# Patient Record
Sex: FEMALE | Race: WHITE | NOT HISPANIC OR LATINO | Employment: FULL TIME | URBAN - METROPOLITAN AREA
[De-identification: names, ages, dates, MRNs, and addresses within clinical notes are randomized per-mention and may not be internally consistent; named-entity substitution may affect disease eponyms.]

---

## 2023-02-23 ENCOUNTER — APPOINTMENT (OUTPATIENT)
Dept: CT IMAGING | Facility: CLINIC | Age: 58
DRG: 190 | End: 2023-02-23
Attending: EMERGENCY MEDICINE
Payer: COMMERCIAL

## 2023-02-23 ENCOUNTER — HOSPITAL ENCOUNTER (INPATIENT)
Facility: CLINIC | Age: 58
LOS: 6 days | Discharge: HOME | DRG: 190 | End: 2023-03-01
Attending: EMERGENCY MEDICINE | Admitting: INTERNAL MEDICINE
Payer: COMMERCIAL

## 2023-02-23 ENCOUNTER — TRANSFERRED RECORDS (OUTPATIENT)
Dept: HEALTH INFORMATION MANAGEMENT | Facility: CLINIC | Age: 58
End: 2023-02-23

## 2023-02-23 ENCOUNTER — APPOINTMENT (OUTPATIENT)
Dept: GENERAL RADIOLOGY | Facility: CLINIC | Age: 58
DRG: 190 | End: 2023-02-23
Attending: EMERGENCY MEDICINE
Payer: COMMERCIAL

## 2023-02-23 DIAGNOSIS — Z20.822 LAB TEST NEGATIVE FOR COVID-19 VIRUS: ICD-10-CM

## 2023-02-23 DIAGNOSIS — J44.1 COPD EXACERBATION (H): ICD-10-CM

## 2023-02-23 LAB
ALBUMIN SERPL BCG-MCNC: 4.4 G/DL (ref 3.5–5.2)
ALBUMIN UR-MCNC: 70 MG/DL
ALP SERPL-CCNC: 91 U/L (ref 35–104)
ALT SERPL W P-5'-P-CCNC: 14 U/L (ref 10–35)
ANION GAP SERPL CALCULATED.3IONS-SCNC: 13 MMOL/L (ref 7–15)
APPEARANCE UR: CLEAR
AST SERPL W P-5'-P-CCNC: 22 U/L (ref 10–35)
ATRIAL RATE - MUSE: 114 BPM
BASE EXCESS BLDV CALC-SCNC: 1.2 MMOL/L (ref -7.7–1.9)
BASE EXCESS BLDV CALC-SCNC: 3.6 MMOL/L (ref -7.7–1.9)
BASE EXCESS BLDV CALC-SCNC: 5 MMOL/L (ref -7.7–1.9)
BASOPHILS # BLD AUTO: 0 10E3/UL (ref 0–0.2)
BASOPHILS NFR BLD AUTO: 0 %
BILIRUB SERPL-MCNC: 0.3 MG/DL
BILIRUB UR QL STRIP: NEGATIVE
BUN SERPL-MCNC: 9.7 MG/DL (ref 6–20)
C PNEUM DNA SPEC QL NAA+PROBE: NOT DETECTED
CALCIUM SERPL-MCNC: 9.5 MG/DL (ref 8.6–10)
CHLORIDE SERPL-SCNC: 100 MMOL/L (ref 98–107)
COLOR UR AUTO: ABNORMAL
CREAT SERPL-MCNC: 0.56 MG/DL (ref 0.51–0.95)
DEPRECATED HCO3 PLAS-SCNC: 28 MMOL/L (ref 22–29)
DIASTOLIC BLOOD PRESSURE - MUSE: NORMAL MMHG
EOSINOPHIL # BLD AUTO: 0 10E3/UL (ref 0–0.7)
EOSINOPHIL NFR BLD AUTO: 0 %
ERYTHROCYTE [DISTWIDTH] IN BLOOD BY AUTOMATED COUNT: 14.2 % (ref 10–15)
FLUAV H1 2009 PAND RNA SPEC QL NAA+PROBE: NOT DETECTED
FLUAV H1 RNA SPEC QL NAA+PROBE: NOT DETECTED
FLUAV H3 RNA SPEC QL NAA+PROBE: NOT DETECTED
FLUAV RNA SPEC QL NAA+PROBE: NEGATIVE
FLUAV RNA SPEC QL NAA+PROBE: NOT DETECTED
FLUBV RNA RESP QL NAA+PROBE: NEGATIVE
FLUBV RNA SPEC QL NAA+PROBE: NOT DETECTED
GFR SERPL CREATININE-BSD FRML MDRD: >90 ML/MIN/1.73M2
GLUCOSE SERPL-MCNC: 140 MG/DL (ref 70–99)
GLUCOSE UR STRIP-MCNC: NEGATIVE MG/DL
HADV DNA SPEC QL NAA+PROBE: NOT DETECTED
HCO3 BLDV-SCNC: 28 MMOL/L (ref 21–28)
HCO3 BLDV-SCNC: 29 MMOL/L (ref 21–28)
HCO3 BLDV-SCNC: 31 MMOL/L (ref 21–28)
HCO3 BLDV-SCNC: 32 MMOL/L (ref 21–28)
HCOV PNL SPEC NAA+PROBE: NOT DETECTED
HCT VFR BLD AUTO: 47.1 % (ref 35–47)
HGB BLD-MCNC: 14.7 G/DL (ref 11.7–15.7)
HGB UR QL STRIP: ABNORMAL
HMPV RNA SPEC QL NAA+PROBE: DETECTED
HOLD SPECIMEN: NORMAL
HPIV1 RNA SPEC QL NAA+PROBE: NOT DETECTED
HPIV2 RNA SPEC QL NAA+PROBE: NOT DETECTED
HPIV3 RNA SPEC QL NAA+PROBE: NOT DETECTED
HPIV4 RNA SPEC QL NAA+PROBE: NOT DETECTED
IMM GRANULOCYTES # BLD: 0 10E3/UL
IMM GRANULOCYTES NFR BLD: 1 %
INTERPRETATION ECG - MUSE: NORMAL
KETONES UR STRIP-MCNC: NEGATIVE MG/DL
L PNEUMO1 AG UR QL IA: NEGATIVE
LACTATE BLD-SCNC: 1 MMOL/L
LEUKOCYTE ESTERASE UR QL STRIP: NEGATIVE
LYMPHOCYTES # BLD AUTO: 0.6 10E3/UL (ref 0.8–5.3)
LYMPHOCYTES NFR BLD AUTO: 9 %
M PNEUMO DNA SPEC QL NAA+PROBE: NOT DETECTED
MCH RBC QN AUTO: 27.9 PG (ref 26.5–33)
MCHC RBC AUTO-ENTMCNC: 31.2 G/DL (ref 31.5–36.5)
MCV RBC AUTO: 90 FL (ref 78–100)
MONOCYTES # BLD AUTO: 0.3 10E3/UL (ref 0–1.3)
MONOCYTES NFR BLD AUTO: 4 %
MUCOUS THREADS #/AREA URNS LPF: PRESENT /LPF
NEUTROPHILS # BLD AUTO: 5.6 10E3/UL (ref 1.6–8.3)
NEUTROPHILS NFR BLD AUTO: 86 %
NITRATE UR QL: NEGATIVE
NRBC # BLD AUTO: 0 10E3/UL
NRBC BLD AUTO-RTO: 0 /100
NT-PROBNP SERPL-MCNC: 810 PG/ML (ref 0–900)
O2/TOTAL GAS SETTING VFR VENT: 40 %
O2/TOTAL GAS SETTING VFR VENT: 5 %
O2/TOTAL GAS SETTING VFR VENT: 5 %
P AXIS - MUSE: 67 DEGREES
PCO2 BLDV: 56 MM HG (ref 40–50)
PCO2 BLDV: 56 MM HG (ref 40–50)
PCO2 BLDV: 57 MM HG (ref 40–50)
PCO2 BLDV: 83 MM HG (ref 40–50)
PH BLDV: 7.14 [PH] (ref 7.32–7.43)
PH BLDV: 7.32 [PH] (ref 7.32–7.43)
PH BLDV: 7.34 [PH] (ref 7.32–7.43)
PH BLDV: 7.37 [PH] (ref 7.32–7.43)
PH UR STRIP: 6 [PH] (ref 5–7)
PLATELET # BLD AUTO: 222 10E3/UL (ref 150–450)
PO2 BLDV: 52 MM HG (ref 25–47)
PO2 BLDV: 54 MM HG (ref 25–47)
PO2 BLDV: 58 MM HG (ref 25–47)
PO2 BLDV: 65 MM HG (ref 25–47)
POTASSIUM SERPL-SCNC: 4.4 MMOL/L (ref 3.4–5.3)
PR INTERVAL - MUSE: 132 MS
PROCALCITONIN SERPL IA-MCNC: 0.05 NG/ML
PROT SERPL-MCNC: 7.8 G/DL (ref 6.4–8.3)
QRS DURATION - MUSE: 78 MS
QT - MUSE: 334 MS
QTC - MUSE: 460 MS
R AXIS - MUSE: 162 DEGREES
RBC # BLD AUTO: 5.26 10E6/UL (ref 3.8–5.2)
RBC URINE: 23 /HPF
RSV RNA SPEC NAA+PROBE: NEGATIVE
RSV RNA SPEC QL NAA+PROBE: NOT DETECTED
RSV RNA SPEC QL NAA+PROBE: NOT DETECTED
RV+EV RNA SPEC QL NAA+PROBE: NOT DETECTED
S PNEUM AG SPEC QL: NEGATIVE
SAO2 % BLDV: 75 % (ref 94–100)
SARS-COV-2 RNA RESP QL NAA+PROBE: NEGATIVE
SODIUM SERPL-SCNC: 141 MMOL/L (ref 136–145)
SP GR UR STRIP: 1.02 (ref 1–1.03)
SQUAMOUS EPITHELIAL: 2 /HPF
SYSTOLIC BLOOD PRESSURE - MUSE: NORMAL MMHG
T AXIS - MUSE: 48 DEGREES
TROPONIN T SERPL HS-MCNC: 13 NG/L
UROBILINOGEN UR STRIP-MCNC: NORMAL MG/DL
VENTRICULAR RATE- MUSE: 114 BPM
WBC # BLD AUTO: 6.5 10E3/UL (ref 4–11)
WBC URINE: 1 /HPF

## 2023-02-23 PROCEDURE — 84145 PROCALCITONIN (PCT): CPT

## 2023-02-23 PROCEDURE — 250N000009 HC RX 250

## 2023-02-23 PROCEDURE — 85025 COMPLETE CBC W/AUTO DIFF WBC: CPT | Performed by: EMERGENCY MEDICINE

## 2023-02-23 PROCEDURE — 80053 COMPREHEN METABOLIC PANEL: CPT | Performed by: EMERGENCY MEDICINE

## 2023-02-23 PROCEDURE — 87637 SARSCOV2&INF A&B&RSV AMP PRB: CPT | Performed by: EMERGENCY MEDICINE

## 2023-02-23 PROCEDURE — 83880 ASSAY OF NATRIURETIC PEPTIDE: CPT | Performed by: EMERGENCY MEDICINE

## 2023-02-23 PROCEDURE — 250N000009 HC RX 250: Performed by: EMERGENCY MEDICINE

## 2023-02-23 PROCEDURE — 36415 COLL VENOUS BLD VENIPUNCTURE: CPT

## 2023-02-23 PROCEDURE — 71275 CT ANGIOGRAPHY CHEST: CPT | Mod: 26 | Performed by: RADIOLOGY

## 2023-02-23 PROCEDURE — 87581 M.PNEUMON DNA AMP PROBE: CPT

## 2023-02-23 PROCEDURE — 93010 ELECTROCARDIOGRAM REPORT: CPT | Mod: 59 | Performed by: EMERGENCY MEDICINE

## 2023-02-23 PROCEDURE — 82803 BLOOD GASES ANY COMBINATION: CPT

## 2023-02-23 PROCEDURE — 250N000011 HC RX IP 250 OP 636: Performed by: EMERGENCY MEDICINE

## 2023-02-23 PROCEDURE — 36415 COLL VENOUS BLD VENIPUNCTURE: CPT | Performed by: EMERGENCY MEDICINE

## 2023-02-23 PROCEDURE — 71045 X-RAY EXAM CHEST 1 VIEW: CPT | Mod: 26 | Performed by: RADIOLOGY

## 2023-02-23 PROCEDURE — 87899 AGENT NOS ASSAY W/OPTIC: CPT

## 2023-02-23 PROCEDURE — 93308 TTE F-UP OR LMTD: CPT | Mod: 26 | Performed by: EMERGENCY MEDICINE

## 2023-02-23 PROCEDURE — 93005 ELECTROCARDIOGRAM TRACING: CPT | Performed by: EMERGENCY MEDICINE

## 2023-02-23 PROCEDURE — 99291 CRITICAL CARE FIRST HOUR: CPT | Mod: 25 | Performed by: EMERGENCY MEDICINE

## 2023-02-23 PROCEDURE — 82803 BLOOD GASES ANY COMBINATION: CPT | Performed by: EMERGENCY MEDICINE

## 2023-02-23 PROCEDURE — 250N000013 HC RX MED GY IP 250 OP 250 PS 637

## 2023-02-23 PROCEDURE — 94640 AIRWAY INHALATION TREATMENT: CPT | Mod: 76

## 2023-02-23 PROCEDURE — 81001 URINALYSIS AUTO W/SCOPE: CPT | Performed by: EMERGENCY MEDICINE

## 2023-02-23 PROCEDURE — 84484 ASSAY OF TROPONIN QUANT: CPT | Performed by: EMERGENCY MEDICINE

## 2023-02-23 PROCEDURE — 71275 CT ANGIOGRAPHY CHEST: CPT

## 2023-02-23 PROCEDURE — 5A09357 ASSISTANCE WITH RESPIRATORY VENTILATION, LESS THAN 24 CONSECUTIVE HOURS, CONTINUOUS POSITIVE AIRWAY PRESSURE: ICD-10-PCS | Performed by: INTERNAL MEDICINE

## 2023-02-23 PROCEDURE — 94640 AIRWAY INHALATION TREATMENT: CPT

## 2023-02-23 PROCEDURE — 96368 THER/DIAG CONCURRENT INF: CPT | Performed by: EMERGENCY MEDICINE

## 2023-02-23 PROCEDURE — 93308 TTE F-UP OR LMTD: CPT | Performed by: EMERGENCY MEDICINE

## 2023-02-23 PROCEDURE — 99223 1ST HOSP IP/OBS HIGH 75: CPT | Mod: GC | Performed by: INTERNAL MEDICINE

## 2023-02-23 PROCEDURE — 96365 THER/PROPH/DIAG IV INF INIT: CPT | Performed by: EMERGENCY MEDICINE

## 2023-02-23 PROCEDURE — 999N000157 HC STATISTIC RCP TIME EA 10 MIN

## 2023-02-23 PROCEDURE — C9803 HOPD COVID-19 SPEC COLLECT: HCPCS | Performed by: EMERGENCY MEDICINE

## 2023-02-23 PROCEDURE — 96375 TX/PRO/DX INJ NEW DRUG ADDON: CPT | Performed by: EMERGENCY MEDICINE

## 2023-02-23 PROCEDURE — 120N000003 HC R&B IMCU UMMC

## 2023-02-23 PROCEDURE — 94660 CPAP INITIATION&MGMT: CPT

## 2023-02-23 PROCEDURE — 71045 X-RAY EXAM CHEST 1 VIEW: CPT

## 2023-02-23 RX ORDER — IOPAMIDOL 755 MG/ML
75 INJECTION, SOLUTION INTRAVASCULAR ONCE
Status: COMPLETED | OUTPATIENT
Start: 2023-02-23 | End: 2023-02-23

## 2023-02-23 RX ORDER — LIDOCAINE 40 MG/G
CREAM TOPICAL
Status: DISCONTINUED | OUTPATIENT
Start: 2023-02-23 | End: 2023-03-01 | Stop reason: HOSPADM

## 2023-02-23 RX ORDER — METHYLPREDNISOLONE SODIUM SUCCINATE 125 MG/2ML
125 INJECTION, POWDER, LYOPHILIZED, FOR SOLUTION INTRAMUSCULAR; INTRAVENOUS ONCE
Status: COMPLETED | OUTPATIENT
Start: 2023-02-23 | End: 2023-02-23

## 2023-02-23 RX ORDER — ALBUTEROL SULFATE 0.83 MG/ML
2.5 SOLUTION RESPIRATORY (INHALATION) ONCE
Status: COMPLETED | OUTPATIENT
Start: 2023-02-23 | End: 2023-02-23

## 2023-02-23 RX ORDER — IPRATROPIUM BROMIDE AND ALBUTEROL SULFATE 2.5; .5 MG/3ML; MG/3ML
3 SOLUTION RESPIRATORY (INHALATION) EVERY 4 HOURS
Status: DISCONTINUED | OUTPATIENT
Start: 2023-02-23 | End: 2023-02-26

## 2023-02-23 RX ORDER — NICOTINE 21 MG/24HR
1 PATCH, TRANSDERMAL 24 HOURS TRANSDERMAL DAILY
Status: DISCONTINUED | OUTPATIENT
Start: 2023-02-23 | End: 2023-02-24

## 2023-02-23 RX ORDER — LISINOPRIL 10 MG/1
10 TABLET ORAL DAILY
Status: DISCONTINUED | OUTPATIENT
Start: 2023-02-23 | End: 2023-03-01 | Stop reason: HOSPADM

## 2023-02-23 RX ORDER — IPRATROPIUM BROMIDE AND ALBUTEROL SULFATE 2.5; .5 MG/3ML; MG/3ML
3 SOLUTION RESPIRATORY (INHALATION)
Status: DISCONTINUED | OUTPATIENT
Start: 2023-02-23 | End: 2023-02-23

## 2023-02-23 RX ORDER — LEVOFLOXACIN 5 MG/ML
500 INJECTION, SOLUTION INTRAVENOUS EVERY 24 HOURS
Status: DISCONTINUED | OUTPATIENT
Start: 2023-02-23 | End: 2023-02-23

## 2023-02-23 RX ORDER — ACETAMINOPHEN 325 MG/1
650 TABLET ORAL EVERY 4 HOURS PRN
Status: DISCONTINUED | OUTPATIENT
Start: 2023-02-23 | End: 2023-02-26

## 2023-02-23 RX ORDER — ALBUTEROL SULFATE 0.83 MG/ML
2.5 SOLUTION RESPIRATORY (INHALATION)
Status: DISCONTINUED | OUTPATIENT
Start: 2023-02-23 | End: 2023-02-27

## 2023-02-23 RX ORDER — POLYETHYLENE GLYCOL 3350 17 G/17G
17 POWDER, FOR SOLUTION ORAL DAILY PRN
Status: DISCONTINUED | OUTPATIENT
Start: 2023-02-23 | End: 2023-03-01 | Stop reason: HOSPADM

## 2023-02-23 RX ORDER — MAGNESIUM SULFATE HEPTAHYDRATE 40 MG/ML
2 INJECTION, SOLUTION INTRAVENOUS ONCE
Status: COMPLETED | OUTPATIENT
Start: 2023-02-23 | End: 2023-02-23

## 2023-02-23 RX ORDER — AMITRIPTYLINE HYDROCHLORIDE 10 MG/1
10 TABLET ORAL AT BEDTIME
Status: DISCONTINUED | OUTPATIENT
Start: 2023-02-23 | End: 2023-03-01 | Stop reason: HOSPADM

## 2023-02-23 RX ORDER — ASPIRIN 81 MG/1
81 TABLET ORAL DAILY
Status: DISCONTINUED | OUTPATIENT
Start: 2023-02-23 | End: 2023-03-01 | Stop reason: HOSPADM

## 2023-02-23 RX ADMIN — AMITRIPTYLINE HYDROCHLORIDE 10 MG: 10 TABLET, FILM COATED ORAL at 23:03

## 2023-02-23 RX ADMIN — IPRATROPIUM BROMIDE AND ALBUTEROL SULFATE 3 ML: 2.5; .5 SOLUTION RESPIRATORY (INHALATION) at 23:53

## 2023-02-23 RX ADMIN — LISINOPRIL 10 MG: 10 TABLET ORAL at 11:49

## 2023-02-23 RX ADMIN — LEVOFLOXACIN 500 MG: 5 INJECTION, SOLUTION INTRAVENOUS at 06:07

## 2023-02-23 RX ADMIN — METHYLPREDNISOLONE SODIUM SUCCINATE 125 MG: 125 INJECTION, POWDER, FOR SOLUTION INTRAMUSCULAR; INTRAVENOUS at 06:08

## 2023-02-23 RX ADMIN — IPRATROPIUM BROMIDE AND ALBUTEROL SULFATE 3 ML: 2.5; .5 SOLUTION RESPIRATORY (INHALATION) at 11:49

## 2023-02-23 RX ADMIN — MAGNESIUM SULFATE 2 G: 2 INJECTION INTRAVENOUS at 06:08

## 2023-02-23 RX ADMIN — IPRATROPIUM BROMIDE AND ALBUTEROL SULFATE 3 ML: 2.5; .5 SOLUTION RESPIRATORY (INHALATION) at 20:05

## 2023-02-23 RX ADMIN — IOPAMIDOL 75 ML: 755 INJECTION, SOLUTION INTRAVENOUS at 08:02

## 2023-02-23 RX ADMIN — NICOTINE 1 PATCH: 14 PATCH, EXTENDED RELEASE TRANSDERMAL at 16:53

## 2023-02-23 RX ADMIN — ALBUTEROL SULFATE 2.5 MG: 2.5 SOLUTION RESPIRATORY (INHALATION) at 09:49

## 2023-02-23 RX ADMIN — ASPIRIN 81 MG: 81 TABLET ORAL at 11:49

## 2023-02-23 RX ADMIN — IPRATROPIUM BROMIDE AND ALBUTEROL SULFATE 3 ML: 2.5; .5 SOLUTION RESPIRATORY (INHALATION) at 16:51

## 2023-02-23 RX ADMIN — IPRATROPIUM BROMIDE AND ALBUTEROL SULFATE 3 ML: 2.5; .5 SOLUTION RESPIRATORY (INHALATION) at 06:18

## 2023-02-23 ASSESSMENT — ACTIVITIES OF DAILY LIVING (ADL)
ADLS_ACUITY_SCORE: 35
WALKING_OR_CLIMBING_STAIRS_DIFFICULTY: NO
ADLS_ACUITY_SCORE: 35
TOILETING_ISSUES: NO
DIFFICULTY_EATING/SWALLOWING: NO
ADLS_ACUITY_SCORE: 20
DRESSING/BATHING_DIFFICULTY: NO
ADLS_ACUITY_SCORE: 35
ADLS_ACUITY_SCORE: 35
CONCENTRATING,_REMEMBERING_OR_MAKING_DECISIONS_DIFFICULTY: NO
ADLS_ACUITY_SCORE: 35
FALL_HISTORY_WITHIN_LAST_SIX_MONTHS: NO
ADLS_ACUITY_SCORE: 35
DOING_ERRANDS_INDEPENDENTLY_DIFFICULTY: NO
WEAR_GLASSES_OR_BLIND: OTHER (SEE COMMENTS)
CHANGE_IN_FUNCTIONAL_STATUS_SINCE_ONSET_OF_CURRENT_ILLNESS/INJURY: NO

## 2023-02-23 NOTE — ED PROVIDER NOTES
ED Provider Note  [facility]      History     Chief Complaint   Patient presents with     Shortness of Breath     HPI  Danita Zambrano is a 57 year old female who reports a past medical history of COPD, hypertension, migraines presenting with shortness of breath for the past several days.  This feels similar to her COPD.  She is found to be saturating in the 70s by medics on room air, placed on BiPAP and given a DuoNeb.  She has been using her rescue inhaler several times over the past few days.  She is not currently on steroids or antibiotics.  She has been coughing up sputum as well.  Denies hemoptysis.  No leg pain, swelling, history of DVT or PE.  Denies chest pain, pressure, diaphoresis, nausea or vomiting.  She has no cardiac history.  She still does occasionally smoke cigarettes.    Past Medical History  Past Medical History:   Diagnosis Date     COPD (chronic obstructive pulmonary disease) (H)      No past surgical history on file.  No current outpatient medications on file.    No Known Allergies  Family History  No family history on file.  Social History       Past medical history, past surgical history, medications, allergies, family history, and social history were reviewed with the patient. No additional pertinent items.      A complete review of systems was performed with pertinent positives and negatives noted in the HPI, and all other systems negative.    Physical Exam   BP: (!) 145/83  Pulse: 114  Temp: 99.9  F (37.7  C)  Resp: (!) 40  SpO2: 95 %  Physical Exam  Physical Exam   Constitutional: oriented to person, place, and time. appears well-developed and well-nourished.   HENT:   Head: Normocephalic and atraumatic.   Neck: Normal range of motion.   Pulmonary/Chest: Increased effort.  Moderate respiratory distress.  Significant bilateral expiratory wheezes  Cardiac: No murmurs, rubs, gallops. RRR.  Abdominal: Abdomen soft, nontender, nondistended. No rebound tenderness.  MSK:  Long bones without deformity or evidence of trauma.  No lower extremity edema.  No tenderness over the calves.  Neurological: alert and oriented to person, place, and time.   Skin: Skin is warm and dry.   Psychiatric:  normal mood and affect.  behavior is normal. Thought content normal.       ED Course, Procedures, & Data      Procedures  Results for orders placed during the hospital encounter of 02/23/23    POC US ECHO LIMITED    Impression  Limited Bedside Cardiac Ultrasound, performed and interpreted by me.  Indication: Shortness of Breath.  Parasternal long axis, parasternal short axis, apical 4 chamber and lung views were acquired.  Poor windows    Findings:  Global left ventricular function appears intact. No B lines    IMPRESSION: Poor overall views however no large pericardial effusion appears to be present.  No significant pulmonary edema       ED Course Selections:        EKG Interpretation:      Interpreted by Randy Franco MD  Time reviewed: 0605  Symptoms at time of EKG: SOB   Rhythm: sinus tachycardia  Rate: Tachycardia  Axis: Right Axis Deviation  Ectopy: none  Conduction: normal  ST Segments/ T Waves: No acute ischemic changes  Q Waves: none  Comparison to prior: No old EKG available    Clinical Impression: Sinus tachycardia with right axis deviation, no signs of ischemia or infarction.      ED Course Selections:   Critical Care Addendum  My initial assessment, based on my review of vital signs, focused history and physical exam, established a high suspicion that Danita Zambrano has respiratory distress, which requires immediate intervention, and therefore she is critically ill.     After the initial assessment, the care team initiated medication therapy with methylprednisone, magnesium, abx, duonebs and initiated intensive non-invasive respiratory support to provide stabilization care. Due to the critical nature of this patient, I reassessed vital signs, physical exam and respiratory status  multiple times prior to her disposition.     Time also spent performing documentation, reviewing test results and coordination of care.     Critical care time (excluding teaching time and procedures): 30 minutes.          Results for orders placed or performed during the hospital encounter of 02/23/23   POC US ECHO LIMITED     Status: None    Impression    Limited Bedside Cardiac Ultrasound, performed and interpreted by me.   Indication: Shortness of Breath.  Parasternal long axis, parasternal short axis, apical 4 chamber and lung views were acquired.   Poor windows    Findings:    Global left ventricular function appears intact. No B lines    IMPRESSION: Poor overall views however no large pericardial effusion appears to be present.  No significant pulmonary edema     CBC with platelets differential     Status: None ()    Narrative    The following orders were created for panel order CBC with platelets differential.  Procedure                               Abnormality         Status                     ---------                               -----------         ------                     CBC with platelets and d...[[id number]]                                                   Please view results for these tests on the individual orders.     Medications   methylPREDNISolone sodium succinate (solu-MEDROL) injection 125 mg (has no administration in time range)   ipratropium - albuterol 0.5 mg/2.5 mg/3 mL (DUONEB) neb solution 3 mL (has no administration in time range)   magnesium sulfate 2 g in 50 mL sterile water intermittent infusion (has no administration in time range)   levofloxacin (LEVAQUIN) infusion 500 mg (has no administration in time range)     Labs Ordered and Resulted from Time of ED Arrival to Time of ED Departure - No data to display  POC US ECHO LIMITED   Final Result   Limited Bedside Cardiac Ultrasound, performed and interpreted by me.    Indication: Shortness of Breath.   Parasternal long axis,  parasternal short axis, apical 4 chamber and lung views were acquired.    Poor windows      Findings:     Global left ventricular function appears intact. No B lines      IMPRESSION: Poor overall views however no large pericardial effusion appears to be present.  No significant pulmonary edema         XR Chest Port 1 View    (Results Pending)              Medical Decision Making  The patient's presentation was of high complexity (an acute health issue posing potential threat to life or bodily function).    The patient's evaluation involved:  an assessment requiring an independent historian (medics)  ordering and/or review of 3+ test(s) in this encounter (see separate area of note for details)    The patient's management necessitated high risk (a decision regarding hospitalization) and further care after sign-out to Dr. Monroe (see their note for further management).      Assessment & Plan    MDM  Patient presenting with shortness of breath.  She is found to be hypoxic on room air.  Here she is speaking full sentences and is alert protecting her airway on BiPAP.  She saturating 100%.  She does have some tachypnea however this is improving, fingers less cyanotic, work of breathing improved and she appears comfortable at this point.  She is given magnesium, antibiotics, DuoNebs for her COPD exacerbation.  She does have a right axis deviation, unclear if this is new.  She says that this is a little bit different than her COPD exacerbation typically as it is much worse.  We will do CT scan to assess for pulmonary embolism.  We will plan on admission for this patient, signed out to morning provider.    I have reviewed the nursing notes. I have reviewed the findings, diagnosis, plan and need for follow up with the patient.    New Prescriptions    No medications on file       Final diagnoses:   COPD exacerbation (H)       Randy Franco  [facility] EMERGENCY DEPARTMENT  2/23/2023     Randy Franco,  MD  02/23/23 0647

## 2023-02-23 NOTE — ED PROVIDER NOTES
Patient received as sign-out at change of shift.  Please see initial note for complete details.  57 year old female with history of COPD who presented to the ED with shortness of breath and wheezing.  Better on BiPAP and after nebs and steroids.  Also given Levaquin.  Awaiting PE protocol chest CT  Acute events during this shift: none.  Results for orders placed or performed during the hospital encounter of 02/23/23   XR Chest Port 1 View     Status: None    Narrative    EXAM: XR CHEST PORT 1 VIEW  LOCATION: [facility]  DATE/TIME: 2/23/2023 6:28 AM    INDICATION: sob  COMPARISON: None.      Impression    IMPRESSION: Heart size is normal. Mild prominence of the basilar interstitium. Upper lung zones are clear. No pneumothorax. No visible pleural effusion.   POC US ECHO LIMITED     Status: None    Impression    Limited Bedside Cardiac Ultrasound, performed and interpreted by me.   Indication: Shortness of Breath.  Parasternal long axis, parasternal short axis, apical 4 chamber and lung views were acquired.   Poor windows    Findings:    Global left ventricular function appears intact. No B lines    IMPRESSION: Poor overall views however no large pericardial effusion appears to be present.  No significant pulmonary edema     CT Chest Pulmonary Embolism w Contrast     Status: None    Narrative    EXAMINATION: CTA pulmonary angiogram, 2/23/2023 8:10 AM     COMPARISON: Chest x-ray 2/23/2023    HISTORY: SOB, ? new RAD on ECG, ? PE    TECHNIQUE: Volumetric helical acquisition of CT images of the chest  from the lung apices to the kidneys were acquired after the  administration of 75 mL of Isovue-370 IV contrast. Post-processed  multiplanar and/or MIP reformations were obtained, archived to PACS  and used in interpretation of this study.     FINDINGS:      Contrast bolus is: adequate.  Exam is negative for acute pulmonary  embolism.     Reflux of contrast into the IVC?  "no  Paradoxical bowing of the interventricular septum to the left? no    Lungs: The central tracheobronchial tree is patent. No pleural  effusion or pneumothorax. Bilateral patchy and tree-in-bud opacities,  greatest in the right upper lobe. Developing consolidation in the  right upper lobe. Mild bronchial wall thickening centrally. Mild  bibasilar atelectasis.    Mediastinum: Subcentimeter hypodensity in the left lobe of the thyroid  gland. The heart is not enlarged. No significant pericardial effusion.  Coronary artery calcification. The ascending aorta and main pulmonary  artery are normal in caliber. There are a few prominent mediastinal  lymph nodes, including a 1.7 x 1.1 cm right precarinal lymph node  (series 6, image 112), likely reactive.    Upper abdomen: Unremarkable.    Bones/Soft Tissues: Degenerative changes in the visualized spine. No  acute osseous abnormalities or suspicious bony lesions.      Impression    IMPRESSION:   1. No pulmonary embolism.  2. Bilateral patchy and tree-in-bud pulmonary opacities with  developing consolidation in the right upper lobe, likely representing  infection/inflammation. Recommend follow-up chest CT within 4 weeks to  try and document clearing.  3. Probable reactive enlarged mediastinal and right hilar lymph nodes.      In the event of a positive result for acute pulmonary embolism  resulting in right heart strain, consider calling the   [facility] hospital  for \"PERT (Pulmonary Embolism Response Team)  Activation?    PERT -- Pulmonary Embolism Response Team (Multidisciplinary team  including cardiology, interventional radiology, critical care,  hematology)    I have personally reviewed the examination and initial interpretation  and I agree with the findings.    CALEB TIDWELL MD         SYSTEM ID:  [id number]   Comprehensive metabolic panel     Status: Abnormal   Result Value Ref Range    Sodium 141 136 - 145 mmol/L    Potassium 4.4 3.4 - 5.3 mmol/L    Chloride " 100 98 - 107 mmol/L    Carbon Dioxide (CO2) 28 22 - 29 mmol/L    Anion Gap 13 7 - 15 mmol/L    Urea Nitrogen 9.7 6.0 - 20.0 mg/dL    Creatinine 0.56 0.51 - 0.95 mg/dL    Calcium 9.5 8.6 - 10.0 mg/dL    Glucose 140 (H) 70 - 99 mg/dL    Alkaline Phosphatase 91 35 - 104 U/L    AST 22 10 - 35 U/L    ALT 14 10 - 35 U/L    Protein Total 7.8 6.4 - 8.3 g/dL    Albumin 4.4 3.5 - 5.2 g/dL    Bilirubin Total 0.3 <=1.2 mg/dL    GFR Estimate >90 >60 mL/min/1.73m2   Troponin T, High Sensitivity     Status: Normal   Result Value Ref Range    Troponin T, High Sensitivity 13 <=14 ng/L   Nt probnp inpatient (BNP)     Status: Normal   Result Value Ref Range    N terminal Pro BNP Inpatient 810 0 - 900 pg/mL   CBC with platelets and differential     Status: Abnormal   Result Value Ref Range    WBC Count 6.5 4.0 - 11.0 10e3/uL    RBC Count 5.26 (H) 3.80 - 5.20 10e6/uL    Hemoglobin 14.7 11.7 - 15.7 g/dL    Hematocrit 47.1 (H) 35.0 - 47.0 %    MCV 90 78 - 100 fL    MCH 27.9 26.5 - 33.0 pg    MCHC 31.2 (L) 31.5 - 36.5 g/dL    RDW 14.2 10.0 - 15.0 %    Platelet Count 222 150 - 450 10e3/uL    % Neutrophils 86 %    % Lymphocytes 9 %    % Monocytes 4 %    % Eosinophils 0 %    % Basophils 0 %    % Immature Granulocytes 1 %    NRBCs per 100 WBC 0 <1 /100    Absolute Neutrophils 5.6 1.6 - 8.3 10e3/uL    Absolute Lymphocytes 0.6 (L) 0.8 - 5.3 10e3/uL    Absolute Monocytes 0.3 0.0 - 1.3 10e3/uL    Absolute Eosinophils 0.0 0.0 - 0.7 10e3/uL    Absolute Basophils 0.0 0.0 - 0.2 10e3/uL    Absolute Immature Granulocytes 0.0 <=0.4 10e3/uL    Absolute NRBCs 0.0 10e3/uL   iStat Gases (lactate) venous, POCT     Status: Abnormal   Result Value Ref Range    Lactic Acid POCT 1.0 <=2.0 mmol/L    Bicarbonate Venous POCT 28 21 - 28 mmol/L    O2 Sat, Venous POCT 75 (L) 94 - 100 %    pCO2V Venous POCT 83 (HH) 40 - 50 mm Hg    pH Venous POCT 7.14 (LL) 7.32 - 7.43    pO2 Venous POCT 54 (H) 25 - 47 mm Hg   Blood gas venous     Status: Abnormal   Result Value Ref  Range    pH Venous 7.32 7.32 - 7.43    pCO2 Venous 56 (H) 40 - 50 mm Hg    pO2 Venous 65 (H) 25 - 47 mm Hg    Bicarbonate Venous 29 (H) 21 - 28 mmol/L    Base Excess/Deficit (+/-) 1.2 -7.7 - 1.9 mmol/L    FIO2 40    Extra Tube (Jenkins Draw)     Status: None    Narrative    The following orders were created for panel order Extra Tube (Jenkins Draw).  Procedure                               Abnormality         Status                     ---------                               -----------         ------                     Extra Blue Top Tube[[id number]]                              Final result               Extra Red Top Tube[[id number]]                               Final result               Extra Purple Top Tube[[id number]]                            Final result                 Please view results for these tests on the individual orders.   Extra Blue Top Tube     Status: None   Result Value Ref Range    Hold Specimen JIC    Extra Red Top Tube     Status: None   Result Value Ref Range    Hold Specimen JIC    Extra Purple Top Tube     Status: None   Result Value Ref Range    Hold Specimen JIC    EKG 12-lead, tracing only     Status: None   Result Value Ref Range    Systolic Blood Pressure  mmHg    Diastolic Blood Pressure  mmHg    Ventricular Rate 114 BPM    Atrial Rate 114 BPM    IL Interval 132 ms    QRS Duration 78 ms     ms    QTc 460 ms    P Axis 67 degrees    R AXIS 162 degrees    T Axis 48 degrees    Interpretation ECG       Sinus tachycardia  Right axis deviation  Abnormal ECG  Unconfirmed report - interpretation of this ECG is computer generated - see medical record for final interpretation  Confirmed by - EMERGENCY ROOM, PHYSICIAN ([id number]),  TRACI EDUARDO ([id number]) on 2/23/2023 6:21:57 AM     CBC with platelets differential     Status: Abnormal    Narrative    The following orders were created for panel order CBC with platelets differential.  Procedure                               Abnormality          Status                     ---------                               -----------         ------                     CBC with platelets and d...[[id number]]  Abnormal            Final result                 Please view results for these tests on the individual orders.      No PE on chest CT.  Right upper lobe infiltrate.  Already given Levaquin.  Admitted to internal medicine on stepdown unit.     Jermain Monroe MD  02/23/23 0912

## 2023-02-23 NOTE — ED TRIAGE NOTES
Biba after potential copd exacerbation  Shortness of breath  Given neb and rescue inhaler then put on bipap  Settings are 12/6  Sating for ems in 70s until bipap then at 100%  fi02 40%  Bp 174/95  Hr 93  EMS noticed blue fingers and cyanosis

## 2023-02-23 NOTE — PROGRESS NOTES
RT called to ED bedside to assist with patient on BiPAP to CT. Upon arrival patients WOB had stabilized, as well as oxygenation status. RT and Float RN tried patient on oxyplus mask 6LPM. Patient tolerated well with SpO2 never going below 89%. RT remained with patient throughout transport and CT scan. Assisted with settling patient back in ED room. Patient remained stable throughout on oxyplus. Patient will be allowed a break from bipap as blood gas, WOB and oxygenation has improved.

## 2023-02-23 NOTE — H&P
"[facility]    History and Physical - Medicine Service, MARO[facility] TEAM 2       Date of Admission:  2/23/2023    Assessment & Plan      Danita Zambrano is a 57 year old female admitted on 2/23/2023. She has hx of 39 pack years of smoking, probable COPD, HTN, HLD and depression admitted with acute hypoxic hypercapnic resp failure in the setting of chronic untreated COPD.    #Acute hypoxic hypercapnic resp failure  # Metapneumovirus infection  #Hx concerning for COPD  #Resp acidosis, resolved  Patient was feeling increasingly SOB with exertion at home in the few days prior; was evaluated by paramedic and found to be hypoxic in 70s. Found to be acidotic at admission which eventually improved. Continues to be hypercapnic; satting 90+ on 5 l.  CT chests suggestive of  right upper lobe consolidation. Viral panel showing human Metapneumovirus.   Bed side POCUS unremarkable for LV dysfn. Urine legionella and strep pneum negative.  - Continue PTA albuterol MDI+alb nebx+scheduled DUONEBS  - O2 goals >88  - sputum culture  - Levofloxacin IV X 5d  - VBG  - IV steroids for now; will reassesses and give IV vs. Oral steroids in the am  - Pulse ox  - COPD consult    #HTN  #HLD  - Continue PTA aspirin,  lisinopriL (hold if SBP<100), simvastatin     #Tobacco use - 39 pack years   - nicotine patch  - addiction counseling    #Depression  - Continue PTA venlafaxine      #hx of R cranial nerve 3 palsy  - aspirin as above    #hx of migraine  - Continue PTA Amitriptyline    # Severe Obesity: Estimated body mass index is 42.23 kg/m  as calculated from the following:    Height as of this encounter: 1.626 m (5' 4\").    Weight as of this encounter: 111.6 kg (246 lb).            Diet: Combination Diet Regular Diet AdultRegular diet  'DVT Prophylaxis: Pneumatic Compression Devices  Singh Catheter: Not present  Fluids: No  Lines: None     Cardiac Monitoring: None  Code Status: Full " CodeFull    Disposition Plan      Expected Discharge Date: 02/25/2023                The patient's care was discussed with the Attending Physician, Dr. Emery Danielle MD.      SHAWN LOBO MD  Medicine Service, [facility] TEAM [address] [place] [address]  Securely message with numberFire (more info)  Text page via [facility] Paging/Directory   See signed in provider for up to date coverage information  ______________________________________________________________________    Chief Complaint   SOB    History is obtained from the patient and chart review    History of Present Illness   Danita Zambrano is a 57 year old female admitted on 2/23/2023. She has hx of 39 pack years of smoking, probable COPD, HTN, HLD and depression admitted with acute hypoxic hypercapnic resp failure in the setting of chronic untreated COPD.    Started having increased shortness of breath in the last few days especially after an episode of loss on Monday Tuesday she said she has been increasingly difficult to walk even inside her house because of shortness of breath.  This triggered her to call paramedics who  found pt having severe hypoxia to 70s, then started on BiPAP in the ambulance to which patient responded significantly.  Patient denies having fever, chills, sore throat, previous pneumonia, previous diagnosis of COPD or contact with sick people.  No history of recent nausea, vomiting, abdominal pain, diarrhea, urinary symptoms.      At ED patient continued to be on BiPAP but otherwise vitally stable withinitial blood gas was concerning for respiratory acidosis; but repeat checks of VBG after continuing to be on BiPAP and with nebulization was reassuring. . Pt continued to have stable hypercapnia at repeated VBG but finger.  Patient was also started on IV levofloxacin in the ED.      Past Medical History    Past Medical History:   Diagnosis Date     COPD (chronic obstructive pulmonary disease) (H)         Past Surgical History   No past surgical history on file.    Prior to Admission Medications       Physical Exam   Vital Signs: Temp: 99.9  F (37.7  C) Temp src: Oral BP: (!) 137/104 Pulse: 104   Resp: 30 SpO2: 94 % O2 Device: Oxi Plus Oxygen Delivery: 6 LPM  Weight: 246 lbs 0 oz   General: Pt NAD  HEENT: PERRLA, EOMI, sclera clear, anicteric  Neck: Supple; accessory muscle use  Card: RRR, normal S1 and S2, no murmurs  Lung: On oxy mask; wheezy, equal but distant BS bilaterally  Abdomen:  soft and nontender, nondistended, no organomegaly, BS+  Neuro: Alert and oriented X 3  Psych: Normal mood, affect and response  Skin: No rashes, skin warm and dry, no erythematous areas  Extremities: No edema, pulse 2+ DP bilaterally      Data     I have personally reviewed the following data over the past 24 hrs:    6.5  \   14.7   / 222     141 100 9.7 /  140 (H)   4.4 28 0.56 \       ALT: 14 AST: 22 AP: 91 TBILI: 0.3   ALB: 4.4 TOT PROTEIN: 7.8 LIPASE: N/A       Trop: 13 BNP: 810       Procal: 0.05 (H) CRP: N/A Lactic Acid: 1.0         Imaging results reviewed over the past 24 hrs:   Recent Results (from the past 24 hour(s))   POC US ECHO LIMITED    Impression    Limited Bedside Cardiac Ultrasound, performed and interpreted by me.   Indication: Shortness of Breath.  Parasternal long axis, parasternal short axis, apical 4 chamber and lung views were acquired.   Poor windows    Findings:    Global left ventricular function appears intact. No B lines    IMPRESSION: Poor overall views however no large pericardial effusion appears to be present.  No significant pulmonary edema     XR Chest Port 1 View    Narrative    EXAM: XR CHEST PORT 1 VIEW  LOCATION: [facility]  DATE/TIME: 2/23/2023 6:28 AM    INDICATION: sob  COMPARISON: None.      Impression    IMPRESSION: Heart size is normal. Mild prominence of the basilar interstitium. Upper lung zones are clear. No pneumothorax. No visible  pleural effusion.   CT Chest Pulmonary Embolism w Contrast    Narrative    EXAMINATION: CTA pulmonary angiogram, 2/23/2023 8:10 AM     COMPARISON: Chest x-ray 2/23/2023    HISTORY: SOB, ? new RAD on ECG, ? PE    TECHNIQUE: Volumetric helical acquisition of CT images of the chest  from the lung apices to the kidneys were acquired after the  administration of 75 mL of Isovue-370 IV contrast. Post-processed  multiplanar and/or MIP reformations were obtained, archived to PACS  and used in interpretation of this study.     FINDINGS:      Contrast bolus is: adequate.  Exam is negative for acute pulmonary  embolism.     Reflux of contrast into the IVC? no  Paradoxical bowing of the interventricular septum to the left? no    Lungs: The central tracheobronchial tree is patent. No pleural  effusion or pneumothorax. Bilateral patchy and tree-in-bud opacities,  greatest in the right upper lobe. Developing consolidation in the  right upper lobe. Mild bronchial wall thickening centrally. Mild  bibasilar atelectasis.    Mediastinum: Subcentimeter hypodensity in the left lobe of the thyroid  gland. The heart is not enlarged. No significant pericardial effusion.  Coronary artery calcification. The ascending aorta and main pulmonary  artery are normal in caliber. There are a few prominent mediastinal  lymph nodes, including a 1.7 x 1.1 cm right precarinal lymph node  (series 6, image 112), likely reactive.    Upper abdomen: Unremarkable.    Bones/Soft Tissues: Degenerative changes in the visualized spine. No  acute osseous abnormalities or suspicious bony lesions.      Impression    IMPRESSION:   1. No pulmonary embolism.  2. Bilateral patchy and tree-in-bud pulmonary opacities with  developing consolidation in the right upper lobe, likely representing  infection/inflammation. Recommend follow-up chest CT within 4 weeks to  try and document clearing.  3. Probable reactive enlarged mediastinal and right hilar lymph nodes.      In the  "event of a positive result for acute pulmonary embolism  resulting in right heart strain, consider calling the   [facility] hospital  for \"PERT (Pulmonary Embolism Response Team)  Activation?    PERT -- Pulmonary Embolism Response Team (Multidisciplinary team  including cardiology, interventional radiology, critical care,  hematology)    I have personally reviewed the examination and initial interpretation  and I agree with the findings.    CALEB TIDWELL MD         SYSTEM ID:  [id number]     "

## 2023-02-23 NOTE — LETTER
[facility], located within [facility] [address]  [address] [facility] [address]  Phone: [phone]    February 25, 2023      Danita Zambrano  [address] [place] [address] [address]  [facility] [address]      To whom it may concern:    RE: Danita HUANG Zambrano    Ms. Zambrano is admitted on 2/23/2023 with a significant medical problem that needs close monitoring. We anticipate the Ms. Zambrano will remain in the hospital for another 2-3 days. Please excuse Danita from her current job in the meantime. She may additionally benefit from 1 week of limited activities to ensure full recovery after discharge.    Please contact me for questions or concerns.      Sincerely,      Hina Paniagua MD, PhD  [address] [address]  Phone: [phone]

## 2023-02-23 NOTE — PROGRESS NOTES
Pt arrived to ED via EMS on BiPAP. Pt was transferred to hospital BiPAP and place on the following settings:    BiPAP 12/6, rate 12, 40% with sats in the mid 90's    RT will continue to follow.    Misty Kelly, RT on 2/23/2023 at 6:13 AM

## 2023-02-24 LAB
ANION GAP SERPL CALCULATED.3IONS-SCNC: 12 MMOL/L (ref 7–15)
BASE EXCESS BLDV CALC-SCNC: 7.7 MMOL/L (ref -7.7–1.9)
BUN SERPL-MCNC: 13.6 MG/DL (ref 6–20)
CALCIUM SERPL-MCNC: 9.3 MG/DL (ref 8.6–10)
CHLORIDE SERPL-SCNC: 100 MMOL/L (ref 98–107)
CREAT SERPL-MCNC: 0.58 MG/DL (ref 0.51–0.95)
DEPRECATED HCO3 PLAS-SCNC: 30 MMOL/L (ref 22–29)
ERYTHROCYTE [DISTWIDTH] IN BLOOD BY AUTOMATED COUNT: 14.3 % (ref 10–15)
GFR SERPL CREATININE-BSD FRML MDRD: >90 ML/MIN/1.73M2
GLUCOSE SERPL-MCNC: 112 MG/DL (ref 70–99)
HCO3 BLDV-SCNC: 36 MMOL/L (ref 21–28)
HCT VFR BLD AUTO: 43.3 % (ref 35–47)
HGB BLD-MCNC: 12.9 G/DL (ref 11.7–15.7)
MCH RBC QN AUTO: 27.7 PG (ref 26.5–33)
MCHC RBC AUTO-ENTMCNC: 29.8 G/DL (ref 31.5–36.5)
MCV RBC AUTO: 93 FL (ref 78–100)
O2/TOTAL GAS SETTING VFR VENT: 5 %
PCO2 BLDV: 68 MM HG (ref 40–50)
PH BLDV: 7.33 [PH] (ref 7.32–7.43)
PLATELET # BLD AUTO: 193 10E3/UL (ref 150–450)
PO2 BLDV: 43 MM HG (ref 25–47)
POTASSIUM SERPL-SCNC: 4.3 MMOL/L (ref 3.4–5.3)
RBC # BLD AUTO: 4.66 10E6/UL (ref 3.8–5.2)
SODIUM SERPL-SCNC: 142 MMOL/L (ref 136–145)
WBC # BLD AUTO: 5.3 10E3/UL (ref 4–11)

## 2023-02-24 PROCEDURE — 272N000202 HC AEROBIKA WITH MANOMETER

## 2023-02-24 PROCEDURE — 999N000157 HC STATISTIC RCP TIME EA 10 MIN

## 2023-02-24 PROCEDURE — 99233 SBSQ HOSP IP/OBS HIGH 50: CPT | Mod: GC | Performed by: INTERNAL MEDICINE

## 2023-02-24 PROCEDURE — 82803 BLOOD GASES ANY COMBINATION: CPT

## 2023-02-24 PROCEDURE — 250N000012 HC RX MED GY IP 250 OP 636 PS 637

## 2023-02-24 PROCEDURE — 82310 ASSAY OF CALCIUM: CPT

## 2023-02-24 PROCEDURE — 36415 COLL VENOUS BLD VENIPUNCTURE: CPT

## 2023-02-24 PROCEDURE — 99207 PR CDG-CUT & PASTE-POTENTIAL IMPACT ON LEVEL: CPT | Performed by: INTERNAL MEDICINE

## 2023-02-24 PROCEDURE — 999N000032 HC STATISTIC CHRONIC DISEASE SPECIALIST RT CONSULT

## 2023-02-24 PROCEDURE — 94799 UNLISTED PULMONARY SVC/PX: CPT

## 2023-02-24 PROCEDURE — 82374 ASSAY BLOOD CARBON DIOXIDE: CPT

## 2023-02-24 PROCEDURE — 94640 AIRWAY INHALATION TREATMENT: CPT | Mod: 76

## 2023-02-24 PROCEDURE — 85027 COMPLETE CBC AUTOMATED: CPT

## 2023-02-24 PROCEDURE — 120N000003 HC R&B IMCU UMMC

## 2023-02-24 PROCEDURE — 250N000009 HC RX 250

## 2023-02-24 PROCEDURE — G0463 HOSPITAL OUTPT CLINIC VISIT: HCPCS

## 2023-02-24 PROCEDURE — 999N000033 HC STATISTIC CHRONIC PULMONARY DISEASE SPECIALIST

## 2023-02-24 PROCEDURE — 250N000013 HC RX MED GY IP 250 OP 250 PS 637

## 2023-02-24 PROCEDURE — 99407 BEHAV CHNG SMOKING > 10 MIN: CPT

## 2023-02-24 RX ORDER — POLYETHYLENE GLYCOL 3350 17 G
2 POWDER IN PACKET (EA) ORAL
Status: DISCONTINUED | OUTPATIENT
Start: 2023-02-24 | End: 2023-03-01 | Stop reason: HOSPADM

## 2023-02-24 RX ORDER — NICOTINE 21 MG/24HR
1 PATCH, TRANSDERMAL 24 HOURS TRANSDERMAL EVERY 24 HOURS
Status: DISCONTINUED | OUTPATIENT
Start: 2023-02-24 | End: 2023-03-01 | Stop reason: HOSPADM

## 2023-02-24 RX ORDER — VENLAFAXINE HYDROCHLORIDE 225 MG/1
225 TABLET, EXTENDED RELEASE ORAL DAILY
COMMUNITY

## 2023-02-24 RX ORDER — ALBUTEROL SULFATE 90 UG/1
2 AEROSOL, METERED RESPIRATORY (INHALATION) EVERY 4 HOURS PRN
COMMUNITY

## 2023-02-24 RX ORDER — LISINOPRIL 10 MG/1
10 TABLET ORAL DAILY
COMMUNITY

## 2023-02-24 RX ORDER — ASPIRIN 81 MG/1
81 TABLET ORAL DAILY
COMMUNITY

## 2023-02-24 RX ORDER — LEVOFLOXACIN 500 MG/1
500 TABLET, FILM COATED ORAL DAILY
Status: COMPLETED | OUTPATIENT
Start: 2023-02-24 | End: 2023-02-25

## 2023-02-24 RX ORDER — VARENICLINE TARTRATE 1 MG/1
1 TABLET, FILM COATED ORAL DAILY
COMMUNITY

## 2023-02-24 RX ORDER — IBUPROFEN 200 MG
600 TABLET ORAL
COMMUNITY

## 2023-02-24 RX ORDER — SIMVASTATIN 20 MG
20 TABLET ORAL AT BEDTIME
COMMUNITY

## 2023-02-24 RX ORDER — AMITRIPTYLINE HYDROCHLORIDE 10 MG/1
10 TABLET ORAL AT BEDTIME
COMMUNITY

## 2023-02-24 RX ORDER — CARBOXYMETHYLCELLULOSE SODIUM 5 MG/ML
1 SOLUTION/ DROPS OPHTHALMIC
Status: DISCONTINUED | OUTPATIENT
Start: 2023-02-24 | End: 2023-03-01 | Stop reason: HOSPADM

## 2023-02-24 RX ORDER — CETIRIZINE HYDROCHLORIDE 10 MG/1
10 TABLET ORAL DAILY
COMMUNITY

## 2023-02-24 RX ORDER — PREDNISONE 20 MG/1
40 TABLET ORAL DAILY
Status: DISCONTINUED | OUTPATIENT
Start: 2023-02-24 | End: 2023-02-26

## 2023-02-24 RX ORDER — TETRAHYDROZOLINE HCL 0.05 %
1 DROPS OPHTHALMIC (EYE)
COMMUNITY

## 2023-02-24 RX ORDER — VARENICLINE TARTRATE 1 MG/1
1 TABLET, FILM COATED ORAL 2 TIMES DAILY
Status: DISCONTINUED | OUTPATIENT
Start: 2023-02-24 | End: 2023-03-01 | Stop reason: HOSPADM

## 2023-02-24 RX ADMIN — LISINOPRIL 10 MG: 10 TABLET ORAL at 08:49

## 2023-02-24 RX ADMIN — IPRATROPIUM BROMIDE AND ALBUTEROL SULFATE 3 ML: 2.5; .5 SOLUTION RESPIRATORY (INHALATION) at 12:44

## 2023-02-24 RX ADMIN — VENLAFAXINE HYDROCHLORIDE 225 MG: 150 CAPSULE, EXTENDED RELEASE ORAL at 08:49

## 2023-02-24 RX ADMIN — IPRATROPIUM BROMIDE AND ALBUTEROL SULFATE 3 ML: 2.5; .5 SOLUTION RESPIRATORY (INHALATION) at 07:40

## 2023-02-24 RX ADMIN — ASPIRIN 81 MG: 81 TABLET ORAL at 08:49

## 2023-02-24 RX ADMIN — IPRATROPIUM BROMIDE AND ALBUTEROL SULFATE 3 ML: 2.5; .5 SOLUTION RESPIRATORY (INHALATION) at 21:06

## 2023-02-24 RX ADMIN — LEVOFLOXACIN 500 MG: 500 TABLET, FILM COATED ORAL at 11:14

## 2023-02-24 RX ADMIN — AMITRIPTYLINE HYDROCHLORIDE 10 MG: 10 TABLET, FILM COATED ORAL at 21:03

## 2023-02-24 RX ADMIN — NICOTINE 1 PATCH: 14 PATCH, EXTENDED RELEASE TRANSDERMAL at 08:53

## 2023-02-24 RX ADMIN — PREDNISONE 40 MG: 20 TABLET ORAL at 08:49

## 2023-02-24 RX ADMIN — NICOTINE 1 PATCH: 21 PATCH, EXTENDED RELEASE TRANSDERMAL at 14:32

## 2023-02-24 RX ADMIN — VARENICLINE 1 MG: 1 TABLET, FILM COATED ORAL at 21:03

## 2023-02-24 RX ADMIN — IPRATROPIUM BROMIDE AND ALBUTEROL SULFATE 3 ML: 2.5; .5 SOLUTION RESPIRATORY (INHALATION) at 16:42

## 2023-02-24 ASSESSMENT — ACTIVITIES OF DAILY LIVING (ADL)
ADLS_ACUITY_SCORE: 20

## 2023-02-24 NOTE — PLAN OF CARE
Neuro: A&Ox4.   Cardiac: SR/ST. SBP 130s which pt thinks is her baseline   Respiratory: Was on 6L oxymask when transferred to floor but currently on 5L NC(pt prefers NC) sating above 92. Wheezes in the upper lobes, lower hard to hear. Pt has been tachypneic but denies SOB.  GI/: Adequate urine output. No BM.  Diet/appetite: Reg diet with good appetite  Activity:  Assist of 1/SBA to bathroom  Pain: Denied pain.  Skin: purple/blue spot on R Foot.  LDA's:  L PIV x2 SL    Plan: Continue with POC. Notify primary team with changes.

## 2023-02-24 NOTE — CONSULTS
Chronic Pulmonary Disease Specialist Consult    2023    Patient: Danita Zambrano      :  1965                    MRN:[id number]      Reason for Consult:  Consulted to provide education for patient with possible COPD and optimize treatment regimen.     History of Present Illness: Danita Zambrano is a 57 year old female admitted on 2023. She has hx of 39 pack years of smoking, probable COPD, HTN, HLD and depression admitted with acute hypoxic hypercapnic resp failure in the setting of chronic untreated COPD.    Smoking Hx: Patient is a 1 ppd current everyday smoker. She has been attempting to quit.                    Pulmonologist/Last office visit   none    Most recent  PFT/interpretation on: none             Sleep Studies:  none    Home Oxygen Use:  none        Pulmonary Rehab History:  none      Home respiratory medications include:      Albuterol MDI Q4 prn    Assessment: Danita was resting in bed upon my entrance to her room. She was on 5 L oxygen via NC with oxygen saturation 94%. BS are diminished with expiratory wheezes. She is mildly short of breath but able to carry on full conversation.         Action:         Evaluated patients inspiratory flow using In-Check device:     --Low resistance setting: Patient able to generate 40 LPM,   which is sufficient inspiratory flow for her Albuterol rescue inhaler.  Albuterol inhalers require a slow inhalation of 20-60 LPM for optimal drug disposition with 5-10 second breath hold.      --Medium resistance setting: Patient able to generate 45 LPM,   which is sufficient inspiratory flow for the use of a DPI.   Medium resistance inhalers require a fast and deep inhalation of   30-80LPM with 5-10 second breath hold. Patient only uses Albuterol at this time. Measured this flow in anticipation of additional inhalers being added for her after she has PFT's and establishes care with pulmonologist.       --Evaluated patients' coordination and technique with inhaler:  Patient demonstrated good technique with all of her inhalers. Educated patient on proper inspiratory flows needed for all her inhalers. Provided and instructed patient on proper use of Aerochamber spacer with inhaler; reiterated that spacer should always be used with her rescue inhaler.       -Patient able to generate a pressure of 5 cm H2O on Aerobika OPEP device for 2 seconds generating good strong non-productive cough. The goal for each breath, for a total of 3 sets of 10 breaths, is to exhale at a of pressure 10-20 for 3-4 seconds without fatigue. Educated patient to perform 2 to 3 'venegas coughs'  to clear airway after each set. Shared that consistent use of this device will help open smaller airways, improve mucus clearance, decrease cough frequency, and improve exercise tolerance.     Recommendations:    Continue with current inpatient respiratory medication schedule.     Overnight oximetry 24-48 hours prior to discharge to assess for nocturnal hypoxia    Will need to establish care with a Pulmonologist and get complete PFT's    Use Aerobika Oscillating PEP Device for 3 sets of 10 breaths two times daily as directed above    21 mg Nicotine Patch to help reduce symptoms of withdrawal and cravings     Smoking Cessation Counseling and Relapse Prevention Consult (Done)    2mg Nicotine Lozenges for cravings and urges    Consider CT chest for lung cancer screening given smoking history    Medication Therapy Management Referral as outpatient to reinforce patient on the proper use of inhalers, nebulizer's, and other home medications    PT/OT consult to perform cognitive evaluation, assess patients functional abilities, limitations, home care needs, and make recommendations for safe transition to home or TCU.    Referral for OP sleep consult to assess for sleep disordered breathing, ADÁN, nocturnal hypoxia, and hypoventilation    Home Oxygen Assessment Testing 24-48 hours prior to discharge to determine O2 needs at rest  "and with exertion/activity. If the patient requires oxygen at rest, the walk test must be performed using the new baseline O2 to determine if patient needs more oxygen with activity.     In the event patient qualifies for oxygen, at rest or with activity, please use the following verbiage in oxygen order: \"Please provide portable oxygen concentrator AND please test for oxygen conserving device using ____LPM to maintain sats between ____)    At discharge continue with patient's home regimen of respiratory medications    Will continue to follow and support patient as needed. Will follow up with phone call 48 hours after discharge.     I spent 40 minutes with the patient.    Candie Yo, PIPER, RRT, CTTS  Chronic Pulmonary Disease Specialist  Office: [phone]   Pager: [phone]             "

## 2023-02-24 NOTE — PLAN OF CARE
Neuro: A&Ox4. Afebrile   Cardiac: SR. VSS  Respiratory: Sating 96% on 5L NC - oxymask at night.   GI/: Adequate urine output. BM X1  Diet/appetite: Tolerating regular diet. Eating well.  Activity: Independent, up to the bathroom  Pain: pt denies pain  Skin: Bilateral LE +2 edema.. SCD at bedside. Pt wants to wear at night.   LDA's:  2 L PIV - SL   Plan: Continue with POC. Notify primary team with changes.

## 2023-02-24 NOTE — CONSULTS
Nicotine Cessation Consult   2023    Patient: Danita Zambrano      :  1965                    MRN:[id number]      History of Present Illness:     Reason for Consult:     Patient open to sharing about her tobacco use and in learning about more options and resources for quitting, staying quit, and in developing a relapse prevention plan.         Types of Tobacco and Amount   Cigarettes 1 ppd   E-Cigs    Smokeless Tobacco    Cigars    Pipes    Waterpipes      Patients last cigarette/vape/e-cig/smokeless tobacco:    2023     Fagerstrom Test for Nicotine Dependence   How soon after waking do you smoke your first cigarette Within 5 minutes=3  5-30 minutes=2  31-60 minute=1  >61 minutes=0            1        Do you find it difficult to refrain from smoking in places where it is forbidden? e.g. Sabianist, restaurants, etc? Yes=1  No=0              0   Which cigarette would you hate to give up? The first in the morning=1  Any other=0            0   How many cigarettes a day do you smoke? 10 or less=0  11-20=1  21-30=2  31 or more=3                      1   Do you smoke more frequently in the morning?   Yes=1  No=0                      0   Do you smoke even if you are sick in bed most of the day? Yes=1  No=0                      0                                                                                                                                     Total Score                      2   SCORE 1-2 = Low Dependence   3-4 = Low to Mod Dependence    5-7 = Moderate Dependence      8+ = High Dependence     [place] Tobacco Withdrawal Scale   DSM-5 Symptoms 0 = none, 1 = slight, 2 = mild, 3 = moderate, 4 = severe   Desire or craving to smoke                   0   Anxious, nervous                   0   Depressed mood, sad                   0   Difficulty concentrating                   0   Increased appetite, hungry, weight gain                   0   Insomnia, sleep problems, awakening at night                  "                                            0   Restless                   1   Impatient                    0   Total Score                   1   Score:  1-8=Slight          8-16=Mild           16-24=moderate     24+=Severe     Stage of Behavior Change:   Pre-contemplation - No intention    Contemplation - Change on the horizon    Preparation - Getting Ready        X   Action - Consistently changed (within 6 months)    Maintenance - Staying quit (more than 6 months)    Relapse - Recycling      Patients Motivation to Quit Scale    Importance (1-10):       8     Readiness (1-10)       9   Confidence  (1-10):       7   Can Patient Imagine a Future without Smoking:    YES   Quit Attempt Date:   2/22/2023   Final Quit Date:      TBD     Patients main reason(s) for smoking include: Patient smokes when she is bored and stressed.    Top Reason(s) to quit smoking: Be healthy and live longer      Quit Attempts: 3    Triggers: stress, boredom, emotional situations    Assessment Using Motivational Interviewing:     MI Intervention: Expressed Empathy/Understanding, Supported Autonomy, Collaboration, Evocation, Permission to raise concern or advise, Open-ended questions, Reflections: simple and complex, Change talk (evoked) and Reframe      Nicotine Dependence Score:     1  Withdrawal Score:     2    Assessment/Education/Recommendations    Education:    -Provided education on the safety of NRT, effects of stabilizing the brain to allow for behavior modification and increasing chances of quitting.   -Provided educational workbook, \"Quitting for Good with Treatment and Support.\"   -Discussed health risks of continued smoking.   -Provided motivation and encouragement.   -Shared that it's never too late to quit and that the benefits are immediate and profoundly impactful.   -Shared that slipping up here and there doesn't necessarily mean she failed; rather, it's an opportunity to reflect and modify her behaviors and habits and to " employ alternate strategies to deal with strong triggers.    Recommendations:   -Encouraged patient to use workbook to help her understand why she smokes, come up with 5 reasons to quit, and to imagine what her future looks like without smoking.   -Encouraged her to develop strategies to distract herself to get past cravings.     Developed Smoking Cessation Relapse Prevention Plan that includes recommendations of:     -Chantix (varenicline); PCP to provide prescription and monitoring after weighing indications and contraindications; contact your PCP with any side effects. To be used as prescribed after establishing [employer] Quit Date.    --Discharge with 7/14/21 mg patch starter kit and continue to use daily, continue the initial patch for 4-6 weeks of smoking abstinence based on withdrawal symptoms. Taper every 4-6 weeks in 7 mg steps as tolerated.     -Discharge with 2 mg lozenges, take first thing in the morning and as needed for cravings and urges to smoke. May be used every 1-2 hours.     **The USPSTF recommends annual screening for lung cancer with low-dose computed tomography (LDCT) in adults aged 50 to 80 years who have a 20 pack-year smoking history and currently smoke or have quit within the past 15 years.    -Agrees to participate in our post-hosp smoking cessation follow-up calls for treatment and support, starting at 48 hrs post discharge, then at 14 days, 1 month, and at 6 months.     Time Spent: I spent 20 minutes with patient. Will notify provider of recommendations.    Gave contact information and will call 48 hours after discharge to provide support.    Candie Yo, PIPER, RRT, CTTS  Chronic Pulmonary Disease Specialist  Office: [phone]   Pager: [phone]

## 2023-02-24 NOTE — PROVIDER NOTIFICATION
[facility] 2 night resident - Araceli Morales MD notified of pt's PCO2 being 68 this morning.    - Pt will be put back on BiPAP

## 2023-02-24 NOTE — CONSULTS
Discharge Pharmacy Test Claim    Patient's commercial [employer] plan prefers coverage for incruse ellipta over spiriva respimat with a copay of $52.01. Contact pharmacy liaison to initiate prior authorization if spiriva is medically necessary.    Test Claim Copay   spiriva respimat not covered   incruse ellipta 52.01   budesonide-formoterol 10.00   ventolin 10.00       Misty Styles  [facility] Pharmacy Liaison  Ph: [phone] Pager: [phone]   Securely message with the Vocera Web Console (learn more here)

## 2023-02-24 NOTE — PROGRESS NOTES
Resident/Fellow Attestation   I, SHAWN LOBO MD, was present with the medical/BOSSMAN student who participated in the service and in the documentation of the note.  I have verified the history and personally performed the physical exam and medical decision making.  I agree with the assessment and plan of care as documented in the note.      SHAWN LOBO MD  PGY1  Date of Service (when I saw the patient): 02/24/23    [facility]    Progress Note - Medicine Service, [facility] TEAM 2       Date of Admission:  2/23/2023    Assessment & Plan   Danita Zambrano is a 57 year old female admitted on 2/23/2023. She has hx of 39 pack years of smoking, probable COPD, HTN, HLD and depression admitted with acute hypoxic hypercapnic resp failure in the setting of chronic untreated COPD.    Changes today:  - switched to PO levoquin 500 for 2 days  - PO prednisone 40mg dailyX5 days  - smoking cessation consult  - Updated daughter  - Chantix 1mg BID; nicotine patch+ gum     #Acute hypoxic hypercapnic resp failure  # Metapneumovirus infection  #Hx concerning for COPD  #Resp acidosis, resolved  Patient was feeling increasingly SOB with exertion at home in the few days prior; was evaluated by paramedic and found to be hypoxic in 70s. Found to be acidotic at admission which eventually improved. Continues to be hypercapnic; satting 90+ on 5L.  CT chests suggestive of right upper lobe consolidation. Viral panel showing human Metapneumovirus.   Bed side POCUS unremarkable for LV dysfn. Urine legionella and strep pneum negative. VGB 2/24 with pCO2 68 and normal pH- patient likely lives at higher pCO2 and is compensating appropriately.  - Continue PTA albuterol MDI  - scheduled Duonebs q4h  - switch to PO levofloxacin for 3 day antibiotic course  - PO prednisone 40mg dailyX5 days  - O2 goals >88  - sputum culture  - COPD consult, appreciate recs     #HTN  #HLD  - Continue PTA  "aspirin,  lisinopril (hold if SBP<100), simvastatin      #Tobacco use - 39 pack years   - Chantix 1mg BID; nicotine patch+ gum  - smoking cessation consult     #Depression  - Continue PTA venlafaxine      #hx of R cranial nerve 3 palsy  - aspirin as above     #hx of migraine  - Continue PTA Amitriptyline     # Severe Obesity: Estimated body mass index is 42.23 kg/m  as calculated from the following:    Height as of this encounter: 1.626 m (5' 4\").    Weight as of this encounter: 111.6 kg (246 lb).         Diet: Combination Diet Regular Diet Adult    DVT Prophylaxis: Pneumatic Compression Devices  Singh Catheter: Not present  Fluids: None  Lines: None     Cardiac Monitoring: None  Code Status: Full Code      Clinically Significant Risk Factors                        # Severe Obesity: Estimated body mass index is 41.1 kg/m  as calculated from the following:    Height as of this encounter: 1.626 m (5' 4\").    Weight as of this encounter: 108.6 kg (239 lb 6.7 oz)., PRESENT ON ADMISSION         Disposition Plan     Expected Discharge Date: 02/25/2023                The patient's care was discussed with the Attending Physician, Dr. Danielle.    Alannah Torres, MS3  Medical Student  Medicine Service, [address]  Securely message with [employer] (more info)  Text page via [employer] Paging/Directory   See signed in provider for up to date coverage information  ______________________________________________________________________    Interval History   Nursing notes reviewed. No acute events overnight. Feels much better than yesterday, but not back to baseline. She feels \"pretty good, but couldn't run a marathon\". Appetite is good, ordered breakfast. Feels only mildly short of breath on NC oxygen. Is able to get up to go to the restroom. Was going to be restarted on BiPAP this morning given high CO2, but did not restart given patient clinically appearing improved and with " normal pH. No chest pain, abdominal pain, n/v/d.    Physical Exam   Vital Signs: Temp: 97.8  F (36.6  C) Temp src: Oral BP: 113/87 Pulse: 98   Resp: 26 SpO2: 94 % O2 Device: Nasal cannula Oxygen Delivery: 5 LPM  Weight: 239 lbs 6.71 oz    General Appearance: NAD, sitting up in bed  HEENT: nasal canula; no discharges; some nasal flaring  Respiratory: On 5L NC, bilateral wheezing, mild work of breathing  Cardiovascular: RRR, normal S1/S2, no murmurs noted  GI: soft, non-tender, non-distended  Skin: No rashes, skin warm and dry  Other: No edema, 2+ DP bilaterally    Medical Decision Making       Please see A&P for additional details of medical decision making.      Data   ------------------------- PAST 24 HR DATA REVIEWED -----------------------------------------------    I have personally reviewed the following data over the past 24 hrs:    5.3  \   12.9   / 193     142 100 13.6 /  112 (H)   4.3 30 (H) 0.58 \       Procal: N/A CRP: N/A Lactic Acid: N/A         Imaging results reviewed over the past 24 hrs:   No results found for this or any previous visit (from the past 24 hour(s)).

## 2023-02-24 NOTE — PLAN OF CARE
Transfer  Transferred from: ED  Via:   Reason for transfer: Pt appropriate for 6B- improved/worsened patient condition  Family: Aware of transfer  Belongings: Received with pt  Chart: Received with pt  Medications: Meds received from old unit with pt  Code Status verified on armband: yes  2 RN Skin Assessment Completed By: Tammie Adams and Catherine (nursing student)  Purple/blue spot on R foot, pt says it is not bruising and has been there since she was a teen.  Med rec completed: yes/no  Bed surface reassessed with algorithm and charted: yes  New bed surface ordered: no  Suction/Ambu bag/Flowmeter at bedside: yes    Report received from: BRAEDEN Moreira  Pt status: On 6L Oxymask otherwise pt appears stable

## 2023-02-25 ENCOUNTER — APPOINTMENT (OUTPATIENT)
Dept: PHYSICAL THERAPY | Facility: CLINIC | Age: 58
DRG: 190 | End: 2023-02-25
Payer: COMMERCIAL

## 2023-02-25 LAB
ANION GAP SERPL CALCULATED.3IONS-SCNC: 11 MMOL/L (ref 7–15)
BASE EXCESS BLDV CALC-SCNC: 8.8 MMOL/L (ref -7.7–1.9)
BUN SERPL-MCNC: 15.3 MG/DL (ref 6–20)
CALCIUM SERPL-MCNC: 9.1 MG/DL (ref 8.6–10)
CHLORIDE SERPL-SCNC: 100 MMOL/L (ref 98–107)
CREAT SERPL-MCNC: 0.64 MG/DL (ref 0.51–0.95)
DEPRECATED HCO3 PLAS-SCNC: 30 MMOL/L (ref 22–29)
ERYTHROCYTE [DISTWIDTH] IN BLOOD BY AUTOMATED COUNT: 14.3 % (ref 10–15)
GFR SERPL CREATININE-BSD FRML MDRD: >90 ML/MIN/1.73M2
GLUCOSE SERPL-MCNC: 90 MG/DL (ref 70–99)
HCO3 BLDV-SCNC: 37 MMOL/L (ref 21–28)
HCT VFR BLD AUTO: 43.7 % (ref 35–47)
HGB BLD-MCNC: 13 G/DL (ref 11.7–15.7)
MCH RBC QN AUTO: 27.4 PG (ref 26.5–33)
MCHC RBC AUTO-ENTMCNC: 29.7 G/DL (ref 31.5–36.5)
MCV RBC AUTO: 92 FL (ref 78–100)
O2/TOTAL GAS SETTING VFR VENT: 40 %
PCO2 BLDV: 70 MM HG (ref 40–50)
PH BLDV: 7.33 [PH] (ref 7.32–7.43)
PLATELET # BLD AUTO: 188 10E3/UL (ref 150–450)
PO2 BLDV: 36 MM HG (ref 25–47)
POTASSIUM SERPL-SCNC: 4.3 MMOL/L (ref 3.4–5.3)
RBC # BLD AUTO: 4.75 10E6/UL (ref 3.8–5.2)
SODIUM SERPL-SCNC: 141 MMOL/L (ref 136–145)
WBC # BLD AUTO: 7.3 10E3/UL (ref 4–11)

## 2023-02-25 PROCEDURE — 82803 BLOOD GASES ANY COMBINATION: CPT

## 2023-02-25 PROCEDURE — 80048 BASIC METABOLIC PNL TOTAL CA: CPT

## 2023-02-25 PROCEDURE — 97161 PT EVAL LOW COMPLEX 20 MIN: CPT | Mod: GP | Performed by: PHYSICAL THERAPIST

## 2023-02-25 PROCEDURE — 999N000111 HC STATISTIC OT IP EVAL DEFER: Performed by: OCCUPATIONAL THERAPIST

## 2023-02-25 PROCEDURE — 97116 GAIT TRAINING THERAPY: CPT | Mod: GP | Performed by: PHYSICAL THERAPIST

## 2023-02-25 PROCEDURE — 85027 COMPLETE CBC AUTOMATED: CPT

## 2023-02-25 PROCEDURE — 94799 UNLISTED PULMONARY SVC/PX: CPT

## 2023-02-25 PROCEDURE — 120N000003 HC R&B IMCU UMMC

## 2023-02-25 PROCEDURE — 250N000009 HC RX 250

## 2023-02-25 PROCEDURE — 94640 AIRWAY INHALATION TREATMENT: CPT | Mod: 76

## 2023-02-25 PROCEDURE — 250N000012 HC RX MED GY IP 250 OP 636 PS 637

## 2023-02-25 PROCEDURE — 99207 PR CDG-CUT & PASTE-POTENTIAL IMPACT ON LEVEL: CPT | Performed by: INTERNAL MEDICINE

## 2023-02-25 PROCEDURE — 97110 THERAPEUTIC EXERCISES: CPT | Mod: GP | Performed by: PHYSICAL THERAPIST

## 2023-02-25 PROCEDURE — 999N000157 HC STATISTIC RCP TIME EA 10 MIN

## 2023-02-25 PROCEDURE — 94640 AIRWAY INHALATION TREATMENT: CPT

## 2023-02-25 PROCEDURE — 250N000013 HC RX MED GY IP 250 OP 250 PS 637

## 2023-02-25 PROCEDURE — 99233 SBSQ HOSP IP/OBS HIGH 50: CPT | Mod: GC | Performed by: INTERNAL MEDICINE

## 2023-02-25 PROCEDURE — 36415 COLL VENOUS BLD VENIPUNCTURE: CPT

## 2023-02-25 RX ADMIN — LEVOFLOXACIN 500 MG: 500 TABLET, FILM COATED ORAL at 09:05

## 2023-02-25 RX ADMIN — IPRATROPIUM BROMIDE AND ALBUTEROL SULFATE 3 ML: 2.5; .5 SOLUTION RESPIRATORY (INHALATION) at 20:56

## 2023-02-25 RX ADMIN — VARENICLINE 1 MG: 1 TABLET, FILM COATED ORAL at 09:05

## 2023-02-25 RX ADMIN — VENLAFAXINE HYDROCHLORIDE 225 MG: 150 CAPSULE, EXTENDED RELEASE ORAL at 09:05

## 2023-02-25 RX ADMIN — IPRATROPIUM BROMIDE AND ALBUTEROL SULFATE 3 ML: 2.5; .5 SOLUTION RESPIRATORY (INHALATION) at 11:30

## 2023-02-25 RX ADMIN — PREDNISONE 40 MG: 20 TABLET ORAL at 09:05

## 2023-02-25 RX ADMIN — IPRATROPIUM BROMIDE AND ALBUTEROL SULFATE 3 ML: 2.5; .5 SOLUTION RESPIRATORY (INHALATION) at 08:31

## 2023-02-25 RX ADMIN — ASPIRIN 81 MG: 81 TABLET ORAL at 09:05

## 2023-02-25 RX ADMIN — AMITRIPTYLINE HYDROCHLORIDE 10 MG: 10 TABLET, FILM COATED ORAL at 23:44

## 2023-02-25 RX ADMIN — NICOTINE 1 PATCH: 21 PATCH, EXTENDED RELEASE TRANSDERMAL at 15:47

## 2023-02-25 RX ADMIN — LISINOPRIL 10 MG: 10 TABLET ORAL at 09:05

## 2023-02-25 RX ADMIN — IPRATROPIUM BROMIDE AND ALBUTEROL SULFATE 3 ML: 2.5; .5 SOLUTION RESPIRATORY (INHALATION) at 00:06

## 2023-02-25 RX ADMIN — IPRATROPIUM BROMIDE AND ALBUTEROL SULFATE 3 ML: 2.5; .5 SOLUTION RESPIRATORY (INHALATION) at 16:04

## 2023-02-25 ASSESSMENT — ACTIVITIES OF DAILY LIVING (ADL)
ADLS_ACUITY_SCORE: 20

## 2023-02-25 NOTE — PROGRESS NOTES
Occupational Therapy: Orders received. Chart reviewed and discussed with care team.? Occupational Therapy not indicated due to lack of skilled IP OT needs. PT to follow for endurance training.? Defer discharge recommendations to PT.? Will complete orders.

## 2023-02-25 NOTE — PROGRESS NOTES
02/25/23 1145   Appointment Info   Signing Clinician's Name / Credentials (PT) Real Rodriguez, PT   Living Environment   People in Home alone   Current Living Arrangements apartment   Home Accessibility no concerns   Transportation Anticipated car, drives self   Living Environment Comments elevator access   Self-Care   Usual Activity Tolerance excellent   Current Activity Tolerance moderate   Equipment Currently Used at Home none   Fall history within last six months no   General Information   Onset of Illness/Injury or Date of Surgery 02/23/23   Referring Physician Hina Catalan MD   Patient/Family Therapy Goals Statement (PT) return home   Pertinent History of Current Problem (include personal factors and/or comorbidities that impact the POC) Danita Zambrano is a 57 year old female admitted on 2/23/2023. She has hx of 39 pack years of smoking, probable COPD, HTN, HLD and depression admitted with acute hypoxic hypercapnic resp failure in the setting of chronic untreated COPD.   General Observations 5 L o2 via NC, 113 HR, 91 % spo2   Cognition   Affect/Mental Status (Cognition) WFL   Strength (Manual Muscle Testing)   Strength Comments generalized deconditioning demonstrated by SOB and fatigue with minimal exertion   Bed Mobility   Comment, (Bed Mobility) ind   Transfers   Comment, (Transfers) SBA   Gait/Stairs (Locomotion)   Comment, (Gait/Stairs) SBA 50 ft with no AD, 5 L o2 via NC, slower moving., requires seated rest break with ambulation and increased o2 needs   Balance   Balance Comments no LOB with static/dynamic balance   Clinical Impression   Criteria for Skilled Therapeutic Intervention Yes, treatment indicated   PT Diagnosis (PT) impaired functional mobility   Influenced by the following impairments deconditioning, weakness   Functional limitations due to impairments ambulation   Clinical Presentation (PT Evaluation Complexity) Stable/Uncomplicated   Clinical Presentation Rationale clinical  judgement   Clinical Decision Making (Complexity) low complexity   Planned Therapy Interventions (PT) gait training;home exercise program;strengthening   Risk & Benefits of therapy have been explained evaluation/treatment results reviewed;care plan/treatment goals reviewed;risks/benefits reviewed;current/potential barriers reviewed;participants included;participants voiced agreement with care plan;patient   PT Total Evaluation Time   PT Eval, Low Complexity Minutes (47809) 6   Physical Therapy Goals   PT Frequency 4x/week   PT Predicted Duration/Target Date for Goal Attainment 03/11/23   PT Goals Gait;Aerobic Activity;PT Goal 1   PT: Gait Independent;Greater than 200 feet   PT: Perform aerobic activity with stable cardiovascular response 20 minutes;ambulation;NuStep   PT: Goal 1 Patient to be independent with HEp in order to improve strength and endurance   PT Discharge Planning   PT Plan progress gait endurance, establish HEP, nustep   PT Discharge Recommendation (DC Rec) home   PT Rationale for DC Rec Patient mobilizing well, currently with increased o2 needs and is deconditioned. Anticipate with continued progress she will be ok to DC home once medically stable. May benefit from pulm rehab if patient interested.   PT Brief overview of current status SBA, encourage short bouts of ambulation

## 2023-02-25 NOTE — PLAN OF CARE
Neuro: A&Ox4.   Cardiac:  Off tele, no tele orders.   Respiratory: Sating >96% on 4L oxy mask. Can switch to NC when needed.   GI/: Adequate urine output. No bm this shift.   Diet/appetite: Tolerating regular diet. Eating well.  Activity:  Independent, up to the bathroom  Pain: pt denies pain  Skin: No new deficits noted. Bilateral LE +2 edema. SCD at bedside  LDA's:  2 L PIV - SL   Plan: Pt transfer orders in to general med floor. Pt will have overnight oxy before discharge.

## 2023-02-25 NOTE — PLAN OF CARE
Temp: 98.3  F (36.8  C) Temp src: Oral BP: 128/77 Pulse: 98   Resp: 20 SpO2: 95 % O2 Device: Oxymask Oxygen Delivery: 5 LPM    Changes this shift:      I: Monitored vitals and assessed pt status.      Neuro: A&O x4. Able to make needs known.  Cardiac: Tele in place, SR. Afebrile. Denies chest pain.  Resp: VSS on 5L oxymask overnight. Denied use of BiPAP, BiPAP machine in room on standby. Wheezes auscultated in lower lobes/bases.   GI/: Voiding independently ambulating to toilet. No BM this shift.   Skin: No new deficits noted.   LDA's: 2 L PIV's in place SL.  Pain: Denies.      Plan: Continue to monitor and follow POC. Notify with changes. Carry out plan of abx and monitor CO2 levels.

## 2023-02-25 NOTE — PROGRESS NOTES
[facility]    Progress Note - Medicine Service, [facility] TEAM 2       Date of Admission:  2/23/2023    Assessment & Plan   Danita Zambrano is a 57 year old female admitted on 2/23/2023. She has hx of 39 pack years of smoking, probable COPD, HTN, HLD and depression admitted with acute hypoxic hypercapnic resp failure in the setting of chronic untreated COPD.    Changes today:  - Continue PO prednisone, nebs; last dose of levo today  - Updated daughter  - Not ready for overnight oxy     # Acute hypoxic hypercapnic resp failure  # Metapneumovirus infection  #Hx concerning for COPD  #Resp acidosis, resolved  Patient was feeling increasingly SOB with exertion at home in the few days prior; was evaluated by paramedic and found to be hypoxic in 70s. Found to be acidotic at admission which eventually improved. Continues to be hypercapnic; satting 90+ on 5L.  CT chests suggestive of right upper lobe consolidation. Viral panel showing human Metapneumovirus.   Bed side POCUS unremarkable for LV dysfn. Urine legionella and strep pneum negative. VGB 2/24 with pCO2 68 and normal pH- patient likely lives at higher pCO2 and is compensating appropriately.  - Continue PTA albuterol MDI  - scheduled Duonebs q4h; can consider switching to MDI 2/26 with prn duonebs or space out duonebs based on repeat assessments  - PO levofloxacin - completed 2/25  - PO prednisone 40mg daily X 5 days (will complete 2/28)  - O2 goals >88  - sputum culture - not collected; pt has minimum sputum production  - COPD RT saw pt - suggested close follow up as outpatient -all ordered  > Sleep study, Pulm f/u, and MMT referral; will need PFTs as outpatient  > Insurance doesn't cover Spiriva; would cover Incruse Ellipta MDI; placed another pharmacy consult to see if symbicort will be covered  > addtnl smoking cessation related recs as below  - Aerobika flutter valve QID with RT    #HTN  #HLD  - Continue PTA aspirin,   "lisinopril (hold if SBP<100), simvastatin      #Tobacco use - 39 pack years   - Chantix 1mg BID; nicotine patch+ gum  - smoking cessation consult     #Depression  - Continue PTA venlafaxine      #hx of R cranial nerve 3 palsy  - aspirin as above     #hx of migraine  - Continue PTA Amitriptyline     # Severe Obesity: Estimated body mass index is 42.23 kg/m  as calculated from the following:    Height as of this encounter: 1.626 m (5' 4\").    Weight as of this encounter: 111.6 kg (246 lb).         Diet: Combination Diet Regular Diet Adult    DVT Prophylaxis: Pneumatic Compression Devices  Singh Catheter: Not present  Fluids: None  Lines: None     Cardiac Monitoring: None  Code Status: Full Code      Disposition Plan Home     Expected Discharge Date: 02/27/2023                The patient's care was discussed with the Attending Physician, Dr. Danielle.    Hina Paniagua MD  Internal Medicine PGY 1  Medicine Service, [address]  Securely message with [employer] (more info)  Text page via [employer] Paging/Directory   See signed in provider for up to date coverage information  ______________________________________________________________________    Interval History   Nursing notes reviewed. No acute events overnight. Continues to need 4-5 L;     Physical Exam   Vital Signs: Temp: 98  F (36.7  C) Temp src: Oral BP: 132/64 Pulse: 101   Resp: 18 SpO2: 93 % O2 Device: Nasal cannula Oxygen Delivery: 5 LPM  Weight: 239 lbs 6.71 oz    General Appearance: NAD, sitting up in bed  HEENT: nasal canula; no discharges; some nasal flaring  Respiratory: On 4L NC, L side more wheezy than R; coarse crackles+, lungs sounded much better today in terms of amount of air movement;  mild work of breathing  Cardiovascular: RRR, normal S1/S2, no murmurs noted  GI: soft, non-tender, non-distended  Skin: No rashes, skin warm and dry  Other: No edema, 2+ DP bilaterally    Medical Decision " Making       Please see A&P for additional details of medical decision making.      Data   ------------------------- PAST 24 HR DATA REVIEWED -----------------------------------------------    I have personally reviewed the following data over the past 24 hrs:    7.3  \   13.0   / 188     141 100 15.3 /  90   4.3 30 (H) 0.64 \       Imaging results reviewed over the past 24 hrs:   No results found for this or any previous visit (from the past 24 hour(s)).

## 2023-02-26 LAB
ANION GAP SERPL CALCULATED.3IONS-SCNC: 15 MMOL/L (ref 7–15)
BASE EXCESS BLDV CALC-SCNC: 9.3 MMOL/L (ref -7.7–1.9)
BUN SERPL-MCNC: 11.8 MG/DL (ref 6–20)
CALCIUM SERPL-MCNC: 9.6 MG/DL (ref 8.6–10)
CHLORIDE SERPL-SCNC: 100 MMOL/L (ref 98–107)
CREAT SERPL-MCNC: 0.63 MG/DL (ref 0.51–0.95)
DEPRECATED HCO3 PLAS-SCNC: 29 MMOL/L (ref 22–29)
ERYTHROCYTE [DISTWIDTH] IN BLOOD BY AUTOMATED COUNT: 14.1 % (ref 10–15)
GFR SERPL CREATININE-BSD FRML MDRD: >90 ML/MIN/1.73M2
GLUCOSE SERPL-MCNC: 95 MG/DL (ref 70–99)
HCO3 BLDV-SCNC: 37 MMOL/L (ref 21–28)
HCT VFR BLD AUTO: 40.5 % (ref 35–47)
HGB BLD-MCNC: 12.3 G/DL (ref 11.7–15.7)
MCH RBC QN AUTO: 27.6 PG (ref 26.5–33)
MCHC RBC AUTO-ENTMCNC: 30.4 G/DL (ref 31.5–36.5)
MCV RBC AUTO: 91 FL (ref 78–100)
O2/TOTAL GAS SETTING VFR VENT: 30 %
PCO2 BLDV: 67 MM HG (ref 40–50)
PH BLDV: 7.36 [PH] (ref 7.32–7.43)
PLATELET # BLD AUTO: 201 10E3/UL (ref 150–450)
PO2 BLDV: 48 MM HG (ref 25–47)
POTASSIUM SERPL-SCNC: 3.6 MMOL/L (ref 3.4–5.3)
RBC # BLD AUTO: 4.46 10E6/UL (ref 3.8–5.2)
SODIUM SERPL-SCNC: 144 MMOL/L (ref 136–145)
WBC # BLD AUTO: 5.5 10E3/UL (ref 4–11)

## 2023-02-26 PROCEDURE — 120N000003 HC R&B IMCU UMMC

## 2023-02-26 PROCEDURE — 99233 SBSQ HOSP IP/OBS HIGH 50: CPT | Mod: GC | Performed by: INTERNAL MEDICINE

## 2023-02-26 PROCEDURE — 36415 COLL VENOUS BLD VENIPUNCTURE: CPT

## 2023-02-26 PROCEDURE — 250N000012 HC RX MED GY IP 250 OP 636 PS 637

## 2023-02-26 PROCEDURE — 999N000157 HC STATISTIC RCP TIME EA 10 MIN

## 2023-02-26 PROCEDURE — 85027 COMPLETE CBC AUTOMATED: CPT

## 2023-02-26 PROCEDURE — 250N000013 HC RX MED GY IP 250 OP 250 PS 637

## 2023-02-26 PROCEDURE — 250N000009 HC RX 250

## 2023-02-26 PROCEDURE — 80048 BASIC METABOLIC PNL TOTAL CA: CPT

## 2023-02-26 PROCEDURE — 99207 PR CDG-CUT & PASTE-POTENTIAL IMPACT ON LEVEL: CPT | Performed by: INTERNAL MEDICINE

## 2023-02-26 PROCEDURE — 82803 BLOOD GASES ANY COMBINATION: CPT

## 2023-02-26 PROCEDURE — 94640 AIRWAY INHALATION TREATMENT: CPT | Mod: 76

## 2023-02-26 PROCEDURE — 250N000009 HC RX 250: Performed by: INTERNAL MEDICINE

## 2023-02-26 RX ORDER — LEVALBUTEROL INHALATION SOLUTION 1.25 MG/3ML
1.25 SOLUTION RESPIRATORY (INHALATION) EVERY 4 HOURS
Status: DISCONTINUED | OUTPATIENT
Start: 2023-02-26 | End: 2023-02-28

## 2023-02-26 RX ORDER — PREDNISONE 20 MG/1
40 TABLET ORAL DAILY
Status: DISCONTINUED | OUTPATIENT
Start: 2023-02-27 | End: 2023-03-01

## 2023-02-26 RX ORDER — ACETAMINOPHEN 325 MG/1
975 TABLET ORAL EVERY 8 HOURS
Status: DISCONTINUED | OUTPATIENT
Start: 2023-02-26 | End: 2023-03-01 | Stop reason: HOSPADM

## 2023-02-26 RX ADMIN — IPRATROPIUM BROMIDE 0.5 MG: 0.5 SOLUTION RESPIRATORY (INHALATION) at 20:43

## 2023-02-26 RX ADMIN — LEVALBUTEROL HYDROCHLORIDE 1.25 MG: 1.25 SOLUTION RESPIRATORY (INHALATION) at 15:56

## 2023-02-26 RX ADMIN — IPRATROPIUM BROMIDE AND ALBUTEROL SULFATE 3 ML: 2.5; .5 SOLUTION RESPIRATORY (INHALATION) at 00:50

## 2023-02-26 RX ADMIN — VARENICLINE 1 MG: 1 TABLET, FILM COATED ORAL at 09:51

## 2023-02-26 RX ADMIN — PREDNISONE 40 MG: 20 TABLET ORAL at 09:51

## 2023-02-26 RX ADMIN — IPRATROPIUM BROMIDE AND ALBUTEROL SULFATE 3 ML: 2.5; .5 SOLUTION RESPIRATORY (INHALATION) at 07:52

## 2023-02-26 RX ADMIN — IPRATROPIUM BROMIDE AND ALBUTEROL SULFATE 3 ML: 2.5; .5 SOLUTION RESPIRATORY (INHALATION) at 12:05

## 2023-02-26 RX ADMIN — VARENICLINE 1 MG: 1 TABLET, FILM COATED ORAL at 21:26

## 2023-02-26 RX ADMIN — IPRATROPIUM BROMIDE AND ALBUTEROL SULFATE 3 ML: 2.5; .5 SOLUTION RESPIRATORY (INHALATION) at 04:24

## 2023-02-26 RX ADMIN — IPRATROPIUM BROMIDE 0.5 MG: 0.5 SOLUTION RESPIRATORY (INHALATION) at 15:56

## 2023-02-26 RX ADMIN — ACETAMINOPHEN 975 MG: 325 TABLET ORAL at 21:26

## 2023-02-26 RX ADMIN — AMITRIPTYLINE HYDROCHLORIDE 10 MG: 10 TABLET, FILM COATED ORAL at 21:26

## 2023-02-26 RX ADMIN — LISINOPRIL 10 MG: 10 TABLET ORAL at 09:51

## 2023-02-26 RX ADMIN — LEVALBUTEROL HYDROCHLORIDE 1.25 MG: 1.25 SOLUTION RESPIRATORY (INHALATION) at 20:43

## 2023-02-26 RX ADMIN — NICOTINE 1 PATCH: 21 PATCH, EXTENDED RELEASE TRANSDERMAL at 14:23

## 2023-02-26 RX ADMIN — ASPIRIN 81 MG: 81 TABLET ORAL at 09:51

## 2023-02-26 RX ADMIN — VENLAFAXINE HYDROCHLORIDE 225 MG: 150 CAPSULE, EXTENDED RELEASE ORAL at 09:51

## 2023-02-26 ASSESSMENT — ACTIVITIES OF DAILY LIVING (ADL)
ADLS_ACUITY_SCORE: 20

## 2023-02-26 NOTE — PLAN OF CARE
Temp: 97.5  F (36.4  C) Temp src: Oral BP: 139/76 Pulse: 92   Resp: 20 SpO2: 98 % O2 Device: Nasal cannula Oxygen Delivery: 4 LPM       I: Monitored vitals and assessed pt status.      Neuro: A&O x4. Able to make needs known.  Cardiac: Tele in place, SR. Afebrile. Denies chest pain.  Resp: VSS on 4L NC overnight. SOB intermittent per pt. On nebs per RT q4.   GI/: Voiding in toilet, ambulating independently. No BM this shift.    Skin: No new deficits noted.   LDA's: 2 L PIVs in place SL.  Pain: Denies.      Plan: Continue to monitor and follow POC. Notify with changes. Pt has med surg orders, potential transfer.

## 2023-02-26 NOTE — PLAN OF CARE
Patient has been assessed for Home Oxygen needs. Oxygen readings:    *Pulse oximetry (SpO2) = 85% on room air at rest while awake.    *SpO2 improved to 92% on 4liters/minute at rest.    *SpO2 = 70% on room air during activity/with exercise.    *SpO2 improved to 90% on 7liters/minute during activity/with exercise.

## 2023-02-26 NOTE — PLAN OF CARE
Neuro: A&Ox4. Afebrile   Cardiac: off tele    Respiratory: Sating >90% on 4L NC. O2 home study completed by RN  GI/: Adequate urine output. BM X  Diet/appetite: Tolerating regular diet. Eating well.  Activity: independent up to chair   Pain: pt denies pain  Skin: No new deficits noted.  LDA's:  2 L PIV -SL   Plan: Continue with POC. Notify primary team with changes.  Discharge TBD. Need sputum sample from pt.

## 2023-02-26 NOTE — PROGRESS NOTES
[facility]    Progress Note - Medicine Service, [facility] TEAM 2       Date of Admission:  2/23/2023    Resident/Fellow Attestation   I, SHAWN LOBO MD, was present with the medical/BOS[employer]AN student who participated in the service and in the documentation of the note.  I have verified the history and personally performed the physical exam and medical decision making.  I agree with the assessment and plan of care as documented in the note.      Pt did desaturate to 86-88 when O2 was reduced to 3L this am.    SHAWN LOBO MD  PGY1  Date of Service (when I saw the patient): 02/26/23      Assessment & Plan   Danita Zambrano is a 57 year old female admitted on 2/23/2023. She has hx of 39 pack years of smoking, probable COPD, HTN, HLD and depression admitted with acute hypoxic hypercapnic resp failure in the setting of chronic untreated COPD.    Changes today:  - Continue PO prednisone, nebs  - Oxygen requirement still ~4L     # Acute hypoxic hypercapnic resp failure  # Metapneumovirus infection  #Hx concerning for COPD  #Resp acidosis, resolved  Patient was feeling increasingly SOB with exertion at home in the few days prior; was evaluated by paramedic and found to be hypoxic in 70s. Found to be acidotic at admission which eventually improved. Continues to be hypercapnic; satting 90+ on 5L.  CT chests suggestive of right upper lobe consolidation. Viral panel showing human Metapneumovirus.   Bed side POCUS unremarkable for LV dysfn. Urine legionella and strep pneum negative. VGB 2/24 with pCO2 68 and normal pH- patient likely lives at higher pCO2 and is compensating appropriately.  - Continue PTA albuterol MDI  - scheduled Duonebs q4h  - PO levofloxacin - completed 2/25  - PO prednisone 40mg daily X 5 days (will complete 2/28)  - O2 goals >88  - sputum culture - not collected; pt has minimum sputum production  - COPD RT saw pt - suggested close follow up as  "outpatient -all ordered  > Sleep study, Pulm f/u, and MMT referral; will need PFTs as outpatient  > Insurance doesn't cover Spiriva; would cover Incruse Ellipta MDI; placed another pharmacy consult to see if symbicort will be covered  > addtnl smoking cessation related recs as below  - Aerobika flutter valve QID with RT    #HTN  #HLD  - Continue PTA aspirin,  lisinopril (hold if SBP<100), simvastatin      #Tobacco use - 39 pack years   - Chantix 1mg BID; nicotine patch+ gum  - smoking cessation consult     #Depression  - Continue PTA venlafaxine      #hx of R cranial nerve 3 palsy  - aspirin as above     #hx of migraine  - Continue PTA Amitriptyline     # Severe Obesity: Estimated body mass index is 42.23 kg/m  as calculated from the following:    Height as of this encounter: 1.626 m (5' 4\").    Weight as of this encounter: 111.6 kg (246 lb).         Diet: Combination Diet Regular Diet Adult    DVT Prophylaxis: Pneumatic Compression Devices  Singh Catheter: Not present  Fluids: None  Lines: None     Cardiac Monitoring: None  Code Status: Full Code      Disposition Plan Home     Expected Discharge Date: 02/27/2023                The patient's care was discussed with the Attending Physician, Dr. Danielle.    Alannah Torres, MS3  Medical Student  Medicine Service, [address]  Securely message with [email or web address] (more info)  Text page via [facility] Paging/Directory   See signed in provider for up to date coverage information  ______________________________________________________________________    Interval History   Nursing notes reviewed. No acute events overnight. Continues to need 4-5 L. Feels good this morning. Notes that she does feel short of breath after walking short distances, but overall feels SOB is improving. Appetite remains good. No chest or abdominal pain, no edema. No new concerns.    Physical Exam   Vital Signs: Temp: 97.8  F (36.6  C) Temp src: Oral BP: " 123/75 Pulse: 105   Resp: 20 SpO2: 90 % O2 Device: Nasal cannula Oxygen Delivery: 4 LPM  Weight: 240 lbs 1.3 oz    General Appearance: NAD, sitting up in bed  HEENT: nasal canula; no discharges; some nasal flaring  Respiratory: On 4L NC, bilateral wheezing, lungs sounded much better today in terms of amount of air movement;  mild work of breathing  Cardiovascular: RRR, normal S1/S2, no murmurs noted  GI: soft, non-tender, non-distended  Skin: No rashes, skin warm and dry  Other: No edema, 2+ DP bilaterally    Medical Decision Making       Please see A&P for additional details of medical decision making.      Data   ------------------------- PAST 24 HR DATA REVIEWED -----------------------------------------------    I have personally reviewed the following data over the past 24 hrs:    5.5  \   12.3   / 201     144 100 11.8 /  95   3.6 29 0.63 \       Imaging results reviewed over the past 24 hrs:   No results found for this or any previous visit (from the past 24 hour(s)).

## 2023-02-26 NOTE — CONSULTS
Care Management Initial Consult    General Information  Assessment completed with: Patient,    Type of CM/SW Visit: Initial Assessment    Primary Care Provider verified and updated as needed:     Readmission within the last 30 days: no previous admission in last 30 days      Reason for Consult: discharge planning     Communication Assessment  Patient's communication style: spoken language (English or Bilingual)    Hearing Difficulty or Deaf: no   Wear Glasses or Blind: other (see comments)    Cognitive  Cognitive/Neuro/Behavioral: WDL                      Living Environment:   People in home: alone     Current living Arrangements: apartment      Able to return to prior arrangements: yes    Family/Social Support:  Care provided by: self  Provides care for: no one  Marital Status: Single  Children, Sibling(s) (grady daughter and sister yissel)          Description of Support System: Supportive    Support Assessment: Adequate family and caregiver support    Current Resources:   Patient receiving home care services: No     Community Resources: None  Equipment currently used at home: none  Supplies currently used at home: None    Employment/Financial:  Employment Status: employed full-time        Financial Concerns: No concerns identified   Referral to Financial Worker: No     Lifestyle & Psychosocial Needs: Pulled From Flowsheet  Social Determinants of Health     Tobacco Use: High Risk     Smoking Tobacco Use: Every Day     Smokeless Tobacco Use: Never     Passive Exposure: Never   Alcohol Use: Not on file   Financial Resource Strain: Not on file   Food Insecurity: Not on file   Transportation Needs: Not on file   Physical Activity: Not on file   Stress: Not on file   Social Connections: Not on file   Intimate Partner Violence: Not on file   Depression: Not on file   Housing Stability: Not on file       Functional Status:  Prior to admission patient needed assistance: none     Mental Health Status:  Mental Health  Status: No Current Concerns       Chemical Dependency Status:  Chemical Dependency Status: No Current Concerns           Values/Beliefs:  Spiritual, Cultural Beliefs, Jehovah's witness Practices, Values that affect care: none             Additional Information:  Danita Zambrano is a 57 year old female admitted on 2/23/2023. She has hx of 39 pack years of smoking, probable COPD, HTN, HLD and depression admitted with acute hypoxic hypercapnic resp failure in the setting of chronic untreated COPD.     Started having increased shortness of breath in the last few days especially after an episode of loss on Monday Tuesday she said she has been increasingly difficult to walk even inside her house because of shortness of breath.  This triggered her to call paramedics who  found pt having severe hypoxia to 70s, then started on BiPAP in the ambulance to which patient responded significantly.  Patient denies having fever, chills, sore throat, previous pneumonia, previous diagnosis of COPD or contact with sick people.  No history of recent nausea, vomiting, abdominal pain, diarrhea, urinary symptoms.       At ED patient continued to be on BiPAP but otherwise vitally stable withinitial blood gas was concerning for respiratory acidosis; but repeat checks of VBG after continuing to be on BiPAP and with nebulization was reassuring. . Pt continued to have stable hypercapnia at repeated VBG but finger.  Patient was also started on IV levofloxacin in the ED.    Danita Just had a O2 walk test this afternoon and stats remained less than <88% qualifying her for home O2. MD needs to place his note before we can proceed with  home medical to obtain O2. Pt was made aware of the process, she seemed pleased by this. So far no other concerns identified at this time.    HARPREET Clinton RN2/26/2023 4:07 PM    Jaclyn RN Care Coordinator  Unit RN pager: [phone]     For Weekend & Holiday on call RN Care Coordinator:  (Tasks: Home care, home infusion,  medical equipment/oxygen, transportation, IMM & MOON forms, etc.)     Text Paging in [employer] Smart Web is the preferred method of contact for these teams     [place] [facility] [place] (0800-1630) Saturday & Sunday; (0800-1630) [facility] Recognized Holidays  Pager #1: [phone] Units: 4A, 4C, 4E, 5A & 5B   Pager #2: [phone] Units: 6A, 6B, 6C, 6D  Pager #3: [phone] Units: 7A, 7B, 7C, 7D & 5C   Pager #4: [phone] Units: 5 Ortho, 5 Med/Surg, 6 Med/Surg, 8A, 10 ICU, & [facility]      For Weekend & Holiday on call Social Work:  (Tasks: TCU, transportation, Hospice, adjustment to illness counseling, Health Care Directives, Child Protection and Domestic Violence concerns, Vulnerable Adult, IMM forms, etc.)     Text Paging in [employer] Smart Web is the preferred method of contact for these teams    [place] (0800 - 1630) Saturday and Sunday  Pager: [phone] Units: 4A, 4C, 4E, 5A and 5B   Pager: [phone] Units: 6A, 6B, 6C, 6D   Pager: [phone] Units: 7A, 7B, 7C, 7D, and 5C      [facility] [place]0800-1630) Saturday and Sunday  Units: 5 Ortho, 5 Med/Surg, 6 Med/Surg, 8A, and 10 ICU   Pager: [phone]   ______________________________________________     After hours for all units everyday- (only the  is available after hours until midnight)  Pager [phone]

## 2023-02-27 ENCOUNTER — APPOINTMENT (OUTPATIENT)
Dept: PHYSICAL THERAPY | Facility: CLINIC | Age: 58
DRG: 190 | End: 2023-02-27
Payer: COMMERCIAL

## 2023-02-27 ENCOUNTER — APPOINTMENT (OUTPATIENT)
Dept: GENERAL RADIOLOGY | Facility: CLINIC | Age: 58
DRG: 190 | End: 2023-02-27
Payer: COMMERCIAL

## 2023-02-27 LAB
ANION GAP SERPL CALCULATED.3IONS-SCNC: 10 MMOL/L (ref 7–15)
BUN SERPL-MCNC: 12.1 MG/DL (ref 6–20)
CALCIUM SERPL-MCNC: 9.5 MG/DL (ref 8.6–10)
CHLORIDE SERPL-SCNC: 98 MMOL/L (ref 98–107)
CREAT SERPL-MCNC: 0.59 MG/DL (ref 0.51–0.95)
DEPRECATED HCO3 PLAS-SCNC: 32 MMOL/L (ref 22–29)
ERYTHROCYTE [DISTWIDTH] IN BLOOD BY AUTOMATED COUNT: 13.9 % (ref 10–15)
GFR SERPL CREATININE-BSD FRML MDRD: >90 ML/MIN/1.73M2
GLUCOSE SERPL-MCNC: 82 MG/DL (ref 70–99)
HCT VFR BLD AUTO: 40.2 % (ref 35–47)
HGB BLD-MCNC: 12 G/DL (ref 11.7–15.7)
MCH RBC QN AUTO: 27.1 PG (ref 26.5–33)
MCHC RBC AUTO-ENTMCNC: 29.9 G/DL (ref 31.5–36.5)
MCV RBC AUTO: 91 FL (ref 78–100)
NT-PROBNP SERPL-MCNC: 36 PG/ML (ref 0–900)
PLATELET # BLD AUTO: 216 10E3/UL (ref 150–450)
POTASSIUM SERPL-SCNC: 3.7 MMOL/L (ref 3.4–5.3)
RBC # BLD AUTO: 4.42 10E6/UL (ref 3.8–5.2)
SODIUM SERPL-SCNC: 140 MMOL/L (ref 136–145)
WBC # BLD AUTO: 5.1 10E3/UL (ref 4–11)

## 2023-02-27 PROCEDURE — 250N000013 HC RX MED GY IP 250 OP 250 PS 637

## 2023-02-27 PROCEDURE — 36415 COLL VENOUS BLD VENIPUNCTURE: CPT

## 2023-02-27 PROCEDURE — 94640 AIRWAY INHALATION TREATMENT: CPT | Mod: 76

## 2023-02-27 PROCEDURE — 80048 BASIC METABOLIC PNL TOTAL CA: CPT

## 2023-02-27 PROCEDURE — 250N000009 HC RX 250: Performed by: INTERNAL MEDICINE

## 2023-02-27 PROCEDURE — 71045 X-RAY EXAM CHEST 1 VIEW: CPT | Mod: 26 | Performed by: RADIOLOGY

## 2023-02-27 PROCEDURE — 71045 X-RAY EXAM CHEST 1 VIEW: CPT

## 2023-02-27 PROCEDURE — 83880 ASSAY OF NATRIURETIC PEPTIDE: CPT

## 2023-02-27 PROCEDURE — 94640 AIRWAY INHALATION TREATMENT: CPT

## 2023-02-27 PROCEDURE — 250N000012 HC RX MED GY IP 250 OP 636 PS 637

## 2023-02-27 PROCEDURE — 97116 GAIT TRAINING THERAPY: CPT | Mod: GP

## 2023-02-27 PROCEDURE — 120N000011 HC R&B TRANSPLANT UMMC

## 2023-02-27 PROCEDURE — 99233 SBSQ HOSP IP/OBS HIGH 50: CPT | Mod: GC | Performed by: INTERNAL MEDICINE

## 2023-02-27 PROCEDURE — 999N000157 HC STATISTIC RCP TIME EA 10 MIN

## 2023-02-27 PROCEDURE — 85027 COMPLETE CBC AUTOMATED: CPT

## 2023-02-27 RX ORDER — LEVALBUTEROL INHALATION SOLUTION 1.25 MG/3ML
1.25 SOLUTION RESPIRATORY (INHALATION)
Status: DISCONTINUED | OUTPATIENT
Start: 2023-02-27 | End: 2023-02-28

## 2023-02-27 RX ADMIN — IPRATROPIUM BROMIDE 0.5 MG: 0.5 SOLUTION RESPIRATORY (INHALATION) at 08:33

## 2023-02-27 RX ADMIN — LEVALBUTEROL HYDROCHLORIDE 1.25 MG: 1.25 SOLUTION RESPIRATORY (INHALATION) at 12:51

## 2023-02-27 RX ADMIN — VARENICLINE 1 MG: 1 TABLET, FILM COATED ORAL at 08:28

## 2023-02-27 RX ADMIN — IPRATROPIUM BROMIDE 0.5 MG: 0.5 SOLUTION RESPIRATORY (INHALATION) at 12:51

## 2023-02-27 RX ADMIN — IPRATROPIUM BROMIDE 0.5 MG: 0.5 SOLUTION RESPIRATORY (INHALATION) at 19:56

## 2023-02-27 RX ADMIN — PREDNISONE 40 MG: 20 TABLET ORAL at 08:28

## 2023-02-27 RX ADMIN — LEVALBUTEROL HYDROCHLORIDE 1.25 MG: 1.25 SOLUTION RESPIRATORY (INHALATION) at 00:34

## 2023-02-27 RX ADMIN — NICOTINE 1 PATCH: 21 PATCH, EXTENDED RELEASE TRANSDERMAL at 13:47

## 2023-02-27 RX ADMIN — LEVALBUTEROL HYDROCHLORIDE 1.25 MG: 1.25 SOLUTION RESPIRATORY (INHALATION) at 17:20

## 2023-02-27 RX ADMIN — VENLAFAXINE HYDROCHLORIDE 225 MG: 150 CAPSULE, EXTENDED RELEASE ORAL at 08:28

## 2023-02-27 RX ADMIN — VARENICLINE 1 MG: 1 TABLET, FILM COATED ORAL at 20:01

## 2023-02-27 RX ADMIN — IPRATROPIUM BROMIDE 0.5 MG: 0.5 SOLUTION RESPIRATORY (INHALATION) at 17:20

## 2023-02-27 RX ADMIN — LEVALBUTEROL HYDROCHLORIDE 1.25 MG: 1.25 SOLUTION RESPIRATORY (INHALATION) at 04:20

## 2023-02-27 RX ADMIN — ACETAMINOPHEN 975 MG: 325 TABLET ORAL at 16:11

## 2023-02-27 RX ADMIN — IPRATROPIUM BROMIDE 0.5 MG: 0.5 SOLUTION RESPIRATORY (INHALATION) at 04:20

## 2023-02-27 RX ADMIN — ACETAMINOPHEN 975 MG: 325 TABLET ORAL at 05:48

## 2023-02-27 RX ADMIN — ASPIRIN 81 MG: 81 TABLET ORAL at 08:28

## 2023-02-27 RX ADMIN — LEVALBUTEROL HYDROCHLORIDE 1.25 MG: 1.25 SOLUTION RESPIRATORY (INHALATION) at 08:33

## 2023-02-27 RX ADMIN — IPRATROPIUM BROMIDE 0.5 MG: 0.5 SOLUTION RESPIRATORY (INHALATION) at 00:34

## 2023-02-27 RX ADMIN — LISINOPRIL 10 MG: 10 TABLET ORAL at 08:28

## 2023-02-27 RX ADMIN — AMITRIPTYLINE HYDROCHLORIDE 10 MG: 10 TABLET, FILM COATED ORAL at 21:52

## 2023-02-27 RX ADMIN — LEVALBUTEROL HYDROCHLORIDE 1.25 MG: 1.25 SOLUTION RESPIRATORY (INHALATION) at 19:56

## 2023-02-27 ASSESSMENT — ACTIVITIES OF DAILY LIVING (ADL)
ADLS_ACUITY_SCORE: 20

## 2023-02-27 NOTE — PLAN OF CARE
Neuro: A&Ox4.   Cardiac: SR. VSS.   Respiratory: Sating 88% on 3L - per MD this is patient's goal.  GI/: Up to BR independently  Diet/appetite: Tolerating regular diet. Eating well.  Activity:  Up independently, up to chair and in halls.  Pain: At acceptable level on current regimen.   Skin: No new deficits noted.  LDA's: PIV SL'd    Plan: Weaning O2, 85% consistently on 2L at rest. Currently at 3L at 88-91%, tolerating well. Requiring up to 7L with activity. Continue with POC. Notify primary team with changes.     Problem: COPD (Chronic Obstructive Pulmonary Disease) Comorbidity  Goal: Maintenance of COPD Symptom Control  Outcome: Progressing  Intervention: Maintain COPD-Symptom Control  Recent Flowsheet Documentation  Taken 2/27/2023 6611 by Sebastian Zaragoza, RN  Medication Review/Management: medications reviewed

## 2023-02-27 NOTE — PLAN OF CARE
Temp: 97.8  F (36.6  C) Temp src: Oral BP: 131/75 Pulse: 97   Resp: 18 SpO2: 92 % O2 Device: Nasal cannula Oxygen Delivery: 4 LPM    This shift: Pt sleeping most of shift. Pt hoping to discharge soon with home oxygen.     I: Monitored vitals and assessed pt status.      Neuro: A&O x4. Able to make needs known.  Cardiac: Tele in place, SR. Afebrile. Denies chest pain.  Resp: VSS on 4-4.5L NC overnight. Up to 7L NC with activity. Dyspnea upon exertion.   GI/: Voiding in toilet, ambulating independently. No BM this shift.   Skin: No new deficits noted.   LDA's: 2 L PIVs in place SL.  Pain: Denies     Plan: Continue to monitor and follow POC. Notify with changes. Med Surg transfer orders in. Possible to discharge home with home oxygen?

## 2023-02-27 NOTE — PROGRESS NOTES
[facility]    Progress Note - Medicine Service, MAROON TEAM 2       Date of Admission:  2/23/2023    Assessment & Plan   Danita Zambrano is a 57 year old female admitted on 2/23/2023. She has hx of 39 pack years of smoking, probable COPD, HTN, HLD and depression admitted with acute hypoxic hypercapnic resp failure in the setting of chronic untreated COPD.    Changes today:  - Continue PO prednisone, nebs  - extending prednisone course given continued wheezing and O2 requirements  - Oxygen requirement 2-3L  - O2 walk test yesterday, requires up to 7 L with activity  - Repeat BNP low; CXR unremarkable for pul edema     # Acute hypoxic hypercapnic resp failure  # Metapneumovirus infection  #Hx concerning for COPD  #Resp acidosis, resolved  Patient was feeling increasingly SOB with exertion at home in the few days prior; was evaluated by paramedic and found to be hypoxic in 70s. Found to be acidotic at admission which eventually improved. Continues to be hypercapnic; satting 90+ on 5L.  CT chests suggestive of right upper lobe consolidation. Viral panel showing human Metapneumovirus.   Bed side POCUS unremarkable for LV dysfn. Urine legionella and strep pneum negative. VGB 2/24 with pCO2 68 and normal pH- patient likely lives at higher pCO2 and is compensating appropriately.  - Continue PTA albuterol MDI  - scheduled Duonebs q4h (substituted with atrovent due to duoneb shortage)  - PO levofloxacin - completed 2/25  - PO prednisone 40mg daily X 7-10 days  - O2 goals >88  - sputum culture - not collected; pt has minimum sputum production  - COPD RT saw pt - suggested close follow up as outpatient -all ordered  > Sleep study, Pulm f/u, and MMT referral; will need PFTs as outpatient  > Insurance doesn't cover Spiriva; would cover Incruse Ellipta MDI; Symbicort and albuterol covered with lower copay; see pharm consult note  > addtnl smoking cessation related recs as below  -  "Aerobika flutter valve QID with RT  - Repeat BNP low; CXR unremarkable for pul edema    #HTN  #HLD  - Continue PTA aspirin,  lisinopril (hold if SBP<100), simvastatin      #Tobacco use - 39 pack years   - Chantix 1mg BID; nicotine patch+ gum  - smoking cessation consult     #Depression  - Continue PTA venlafaxine      #hx of R cranial nerve 3 palsy  - aspirin as above     #hx of migraine  - Continue PTA Amitriptyline     # Severe Obesity: Estimated body mass index is 42.23 kg/m  as calculated from the following:    Height as of this encounter: 1.626 m (5' 4\").    Weight as of this encounter: 111.6 kg (246 lb).         Diet: Combination Diet Regular Diet Adult    DVT Prophylaxis: Pneumatic Compression Devices  Singh Catheter: Not present  Fluids: None  Lines: None     Cardiac Monitoring: None  Code Status: Full Code      Disposition Plan Home     Expected Discharge Date: 02/28/2023      Destination: home          The patient's care was discussed with the Attending Physician, Dr. Danielle.    Alannah Torres, MS3  Medical Student  Medicine Service, [address]  Securely message with Vocera (more info)  Text page via [facility] Paging/Directory   See signed in provider for up to date coverage information     Resident/Fellow Attestation   I, SHAWN LOBO MD, was present with the medical/BOSSMAN student who participated in the service and in the documentation of the note.  I have verified the history and personally performed the physical exam and medical decision making.  I agree with the assessment and plan of care as documented in the note.      Lung sounds equal but low exchange; wheezing + (was see prior to morning nebs). Anticipate patient will go home on O2; will reassess for home O2 needs later.    SHAWN LOBO MD  PGY1  Date of Service (when I saw the patient): " 02/27/23    ______________________________________________________________________    Interval History   Nursing notes reviewed. No acute events overnight. Continues to need ~4 L. Feels good this morning. Overall feels SOB is improving, wants to go home soon. Appetite remains good. No chest or abdominal pain, no edema. No new concerns.    Physical Exam   Vital Signs: Temp: 97.9  F (36.6  C) Temp src: Oral BP: 107/78 Pulse: 100   Resp: 18 SpO2: 91 % O2 Device: Nasal cannula Oxygen Delivery: 3 LPM  Weight: 240 lbs 1.3 oz    General Appearance: NAD, sitting up in bed  HEENT: nasal canula; no discharges; some nasal flaring  Respiratory: On 2-3L NC, bilateral wheezing, not moving a lot of air; mild work of breathing  Cardiovascular: RRR, normal S1/S2, no murmurs noted  GI: soft, non-tender, non-distended  Skin: No rashes, skin warm and dry  Other: No edema, 2+ DP bilaterally    Medical Decision Making       Please see A&P for additional details of medical decision making.      Data   ------------------------- PAST 24 HR DATA REVIEWED -----------------------------------------------    I have personally reviewed the following data over the past 24 hrs:    5.1  \   12.0   / 216     140 98 12.1 /  82   3.7 32 (H) 0.59 \       Trop: N/A BNP: 36       Imaging results reviewed over the past 24 hrs:   Recent Results (from the past 24 hour(s))   XR Chest Port 1 View    Narrative    Portable chest    INDICATION: Persistent hypoxia    COMPARISON: To/23/23    FINDINGS: Heart size normal. Scattered patchy and streaky densities in  the lower lung zones appears similar. No brandon new consolidations or  other opacities.      Impression    IMPRESSION: No interval change with possible edema or infection.    CALEB TIDWELL MD         SYSTEM ID:  [id number]

## 2023-02-27 NOTE — CONSULTS
Patient has [employer] through an employer.    Carlos    Spiriva Not covered.   Incruse $52/mo.       LABA/ICS    Symbicort 160/4.5 Not covered.   Breo 100/25 $55/mo.   Advair 250/50 $10/mo.   AirDuo generic $10/mo.       LAMA/LABA/ICS    Trelegy $75/mo*     *Patient is eligible to download a copay card from [employer] to reduce this to $10 every month.    Ariadna Luu  Pharmacy Technician/Liaison, Discharge Pharmacy   [phone] (voice or text)  [email or web address]

## 2023-02-27 NOTE — PLAN OF CARE
Neuro: A&Ox4.   Cardiac: No tele order. HR's 's. BP's 130's/80's. Afebrile.   Respiratory: Sating >89% on 3L NC at rest, up to 6L with activity. MARI. LS diminished. On scheduled nebs.  GI/: Adequate UOP via BR. No BM. Passing flatus.  Diet/appetite: Tolerating regular diet. Eating well. Denied N/V.  Activity: Up ad ricci, independent with activity.   Pain: C/o of chronic back pain. PRN Tylenol given x1 with some relief.  Skin: No new deficits noted. Mild BLE edema (1+). Elevated for comfort.  LDA's: L PIV - SL.    Plan: Will continue with POC and notify primary team with any changes.

## 2023-02-28 ENCOUNTER — APPOINTMENT (OUTPATIENT)
Dept: PHYSICAL THERAPY | Facility: CLINIC | Age: 58
DRG: 190 | End: 2023-02-28
Payer: COMMERCIAL

## 2023-02-28 LAB
ANION GAP SERPL CALCULATED.3IONS-SCNC: 11 MMOL/L (ref 7–15)
BUN SERPL-MCNC: 11.3 MG/DL (ref 6–20)
CALCIUM SERPL-MCNC: 9.3 MG/DL (ref 8.6–10)
CHLORIDE SERPL-SCNC: 98 MMOL/L (ref 98–107)
CREAT SERPL-MCNC: 0.61 MG/DL (ref 0.51–0.95)
DEPRECATED HCO3 PLAS-SCNC: 30 MMOL/L (ref 22–29)
ERYTHROCYTE [DISTWIDTH] IN BLOOD BY AUTOMATED COUNT: 13.9 % (ref 10–15)
GFR SERPL CREATININE-BSD FRML MDRD: >90 ML/MIN/1.73M2
GLUCOSE SERPL-MCNC: 86 MG/DL (ref 70–99)
HCT VFR BLD AUTO: 42.3 % (ref 35–47)
HGB BLD-MCNC: 12.7 G/DL (ref 11.7–15.7)
MCH RBC QN AUTO: 27.5 PG (ref 26.5–33)
MCHC RBC AUTO-ENTMCNC: 30 G/DL (ref 31.5–36.5)
MCV RBC AUTO: 92 FL (ref 78–100)
PLATELET # BLD AUTO: 184 10E3/UL (ref 150–450)
POTASSIUM SERPL-SCNC: 4.2 MMOL/L (ref 3.4–5.3)
RBC # BLD AUTO: 4.62 10E6/UL (ref 3.8–5.2)
SODIUM SERPL-SCNC: 139 MMOL/L (ref 136–145)
WBC # BLD AUTO: 5.1 10E3/UL (ref 4–11)

## 2023-02-28 PROCEDURE — 97116 GAIT TRAINING THERAPY: CPT | Mod: GP

## 2023-02-28 PROCEDURE — 97530 THERAPEUTIC ACTIVITIES: CPT | Mod: GP

## 2023-02-28 PROCEDURE — 99233 SBSQ HOSP IP/OBS HIGH 50: CPT | Mod: GC | Performed by: STUDENT IN AN ORGANIZED HEALTH CARE EDUCATION/TRAINING PROGRAM

## 2023-02-28 PROCEDURE — 999N000157 HC STATISTIC RCP TIME EA 10 MIN

## 2023-02-28 PROCEDURE — 250N000011 HC RX IP 250 OP 636

## 2023-02-28 PROCEDURE — 250N000009 HC RX 250: Performed by: INTERNAL MEDICINE

## 2023-02-28 PROCEDURE — 94799 UNLISTED PULMONARY SVC/PX: CPT

## 2023-02-28 PROCEDURE — 36415 COLL VENOUS BLD VENIPUNCTURE: CPT

## 2023-02-28 PROCEDURE — 97110 THERAPEUTIC EXERCISES: CPT | Mod: GP

## 2023-02-28 PROCEDURE — 250N000012 HC RX MED GY IP 250 OP 636 PS 637

## 2023-02-28 PROCEDURE — 94640 AIRWAY INHALATION TREATMENT: CPT | Mod: 76

## 2023-02-28 PROCEDURE — 120N000011 HC R&B TRANSPLANT UMMC

## 2023-02-28 PROCEDURE — 85027 COMPLETE CBC AUTOMATED: CPT

## 2023-02-28 PROCEDURE — 94640 AIRWAY INHALATION TREATMENT: CPT

## 2023-02-28 PROCEDURE — 250N000009 HC RX 250

## 2023-02-28 PROCEDURE — 250N000013 HC RX MED GY IP 250 OP 250 PS 637

## 2023-02-28 PROCEDURE — 82310 ASSAY OF CALCIUM: CPT

## 2023-02-28 RX ORDER — LEVALBUTEROL INHALATION SOLUTION 1.25 MG/3ML
1.25 SOLUTION RESPIRATORY (INHALATION) EVERY 6 HOURS PRN
Status: DISCONTINUED | OUTPATIENT
Start: 2023-02-28 | End: 2023-03-01 | Stop reason: HOSPADM

## 2023-02-28 RX ORDER — ENOXAPARIN SODIUM 100 MG/ML
40 INJECTION SUBCUTANEOUS EVERY 12 HOURS
Status: DISCONTINUED | OUTPATIENT
Start: 2023-02-28 | End: 2023-03-01 | Stop reason: HOSPADM

## 2023-02-28 RX ORDER — LEVALBUTEROL INHALATION SOLUTION 1.25 MG/3ML
1.25 SOLUTION RESPIRATORY (INHALATION)
Status: DISCONTINUED | OUTPATIENT
Start: 2023-02-28 | End: 2023-02-28 | Stop reason: ALTCHOICE

## 2023-02-28 RX ADMIN — IPRATROPIUM BROMIDE 0.5 MG: 0.5 SOLUTION RESPIRATORY (INHALATION) at 20:25

## 2023-02-28 RX ADMIN — IPRATROPIUM BROMIDE 0.5 MG: 0.5 SOLUTION RESPIRATORY (INHALATION) at 00:01

## 2023-02-28 RX ADMIN — NICOTINE 1 PATCH: 21 PATCH, EXTENDED RELEASE TRANSDERMAL at 14:40

## 2023-02-28 RX ADMIN — ACETAMINOPHEN 975 MG: 325 TABLET ORAL at 08:09

## 2023-02-28 RX ADMIN — LEVALBUTEROL HYDROCHLORIDE 1.25 MG: 1.25 SOLUTION RESPIRATORY (INHALATION) at 08:21

## 2023-02-28 RX ADMIN — PREDNISONE 40 MG: 20 TABLET ORAL at 08:09

## 2023-02-28 RX ADMIN — VENLAFAXINE HYDROCHLORIDE 225 MG: 150 CAPSULE, EXTENDED RELEASE ORAL at 08:08

## 2023-02-28 RX ADMIN — LISINOPRIL 10 MG: 10 TABLET ORAL at 08:09

## 2023-02-28 RX ADMIN — ENOXAPARIN SODIUM 40 MG: 40 INJECTION SUBCUTANEOUS at 20:04

## 2023-02-28 RX ADMIN — IPRATROPIUM BROMIDE 0.5 MG: 0.5 SOLUTION RESPIRATORY (INHALATION) at 08:21

## 2023-02-28 RX ADMIN — LEVALBUTEROL HYDROCHLORIDE 1.25 MG: 1.25 SOLUTION RESPIRATORY (INHALATION) at 11:29

## 2023-02-28 RX ADMIN — IPRATROPIUM BROMIDE 0.5 MG: 0.5 SOLUTION RESPIRATORY (INHALATION) at 16:03

## 2023-02-28 RX ADMIN — AMITRIPTYLINE HYDROCHLORIDE 10 MG: 10 TABLET, FILM COATED ORAL at 21:57

## 2023-02-28 RX ADMIN — VARENICLINE 1 MG: 1 TABLET, FILM COATED ORAL at 20:04

## 2023-02-28 RX ADMIN — ACETAMINOPHEN 975 MG: 325 TABLET ORAL at 23:19

## 2023-02-28 RX ADMIN — ASPIRIN 81 MG: 81 TABLET ORAL at 08:09

## 2023-02-28 RX ADMIN — ACETAMINOPHEN 975 MG: 325 TABLET ORAL at 16:27

## 2023-02-28 RX ADMIN — ACETAMINOPHEN 975 MG: 325 TABLET ORAL at 00:23

## 2023-02-28 RX ADMIN — LEVALBUTEROL HYDROCHLORIDE 1.25 MG: 1.25 SOLUTION RESPIRATORY (INHALATION) at 16:05

## 2023-02-28 RX ADMIN — IPRATROPIUM BROMIDE 0.5 MG: 0.5 SOLUTION RESPIRATORY (INHALATION) at 11:29

## 2023-02-28 RX ADMIN — LEVALBUTEROL HYDROCHLORIDE 1.25 MG: 1.25 SOLUTION RESPIRATORY (INHALATION) at 00:01

## 2023-02-28 RX ADMIN — VARENICLINE 1 MG: 1 TABLET, FILM COATED ORAL at 08:09

## 2023-02-28 RX ADMIN — LEVALBUTEROL HYDROCHLORIDE 1.25 MG: 1.25 SOLUTION RESPIRATORY (INHALATION) at 20:24

## 2023-02-28 ASSESSMENT — ACTIVITIES OF DAILY LIVING (ADL)
ADLS_ACUITY_SCORE: 20

## 2023-02-28 NOTE — PROVIDER NOTIFICATION
Admitted/transferred from: 6B  Time of arrival on unit 2100  2 RN full  skin assessment completed by Amanda Gallegos   Skin assessment finding: slight tape burn from old tele strips, otherwise no issues   Interventions/actions: none    Will continue to monitor.

## 2023-02-28 NOTE — PLAN OF CARE
"/82 (BP Location: Right arm)   Pulse 107   Temp 97.6  F (36.4  C) (Oral)   Resp 18   Ht 1.626 m (5' 4\")   Wt 108.9 kg (240 lb 1.3 oz)   LMP  (LMP Unknown)   SpO2 90%   BMI 41.21 kg/m      Respiratory: O2 >90% on 2 L on rest and during activities. Expiratory wheezing. Denies SOB.   Cardiac: WDL. Pt can be slightly tachycardic. Pt denies cardiac chest pain.   Neuro: A&OX4. Pt able to make needs known.  GI/: Abdomen is soft and nontender. +BS, +flatus, and pt had 1 BM on this shift.  Voids without difficulties, AUOP.    Diet: Combination regular, pt tolerating diet. Good appetite.   Skin: No new deficit noted.   Lines/drains: L PIV SL.   Pain: Denies.  Labs: Reviewed.   Activity:  SBA, fairly independent.   Plan: Continue to monitor and follow POC.     "

## 2023-02-28 NOTE — PROGRESS NOTES
Transfer  Transferred to: 7A  Via: bed  Reason for transfer: Pt no longer appropriate for 6B- improved patient condition  Family: Aware of transfer - pt notified family  Belongings: Packed and sent with pt  Chart: Delivered with pt to next unit  Medications: Meds sent to new unit with pt  Report given to: Rosy DERAS  Pt status: Stable on 3L NC    Marek Ramesh RN on 2/27/2023 at 9:23 PM

## 2023-02-28 NOTE — PLAN OF CARE
"Goal Outcome Evaluation:    /66 (BP Location: Left arm)   Pulse 96   Temp 98  F (36.7  C) (Oral)   Resp 18   Ht 1.626 m (5' 4\")   Wt 108.9 kg (240 lb 1.3 oz)   LMP  (LMP Unknown)   SpO2 91%   BMI 41.21 kg/m      Shift: 1677-5953  Isolation Status: Contact precautions   VS: Vitals stable, 3 L nasal cannula at rest, 6 L w/ activity, afebrile  Neuro: Alert and oriented x4   BG: No sugars   Labs: Awaiting AM labs   Cardiac: WDL  Pain/Nausea: Denies nausea, lower back pain managed by scheduled tylenol   Diet: Regular  IV Access: PIV x1 saline locked   GI/: Voiding adequately, last BM 2/27  Skin: No issues   Mobility: Independent in room   Plan: Continue with POC and notify provider with any changes       "

## 2023-02-28 NOTE — PROGRESS NOTES
[facility]    Progress Note - Medicine Service, MARJANICE TEAM 2       Date of Admission:  2/23/2023    Assessment & Plan   Danita Zambrano is a 57 year old female admitted on 2/23/2023. She has hx of 39 pack years of smoking, probable COPD, HTN, HLD and depression admitted with acute hypoxic hypercapnic resp failure in the setting of chronic untreated COPD.    Changes today:  - Continue PO prednisone, nebs  - extending prednisone course given continued wheezing and O2 requirements  - Oxygen requirement 2-3L at rest     # Acute hypoxic hypercapnic resp failure  # Metapneumovirus infection  #Hx concerning for COPD  #Resp acidosis, resolved  Patient was feeling increasingly SOB with exertion at home in the few days prior; was evaluated by paramedic and found to be hypoxic in 70s. Found to be acidotic at admission which eventually improved. Continues to be hypercapnic; satting 90+ on 5L.  CT chests suggestive of right upper lobe consolidation. Viral panel showing human Metapneumovirus.   Bed side POCUS unremarkable for LV dysfn. Urine legionella and strep pneum negative. VGB 2/24 with pCO2 68 and normal pH- patient likely lives at higher pCO2 and is compensating appropriately.  - Continue PTA albuterol MDI  - scheduled Duonebs q4h (substituted with atrovent due to duoneb shortage)  - PO levofloxacin - completed 2/25  - s/p methylpred 125 mg 2/23 followed by PO prednisone 40mg daily X 7-10 days  - O2 goals >88  - sputum culture - not collected; pt has minimum sputum production  - COPD RT saw pt - suggested close follow up as outpatient -all ordered  > Sleep study, Pulm f/u, and MMT referral; will need PFTs as outpatient  > Insurance doesn't cover Spiriva; would cover Incruse Ellipta MDI; Symbicort and albuterol covered with lower copay; see pharm consult note  > addtnl smoking cessation related recs as below  - Aerobika flutter valve QID with RT  - 2/27 repeat BNP low; CXR  "unremarkable for pulmonary edema    #HTN  #HLD  - Continue PTA aspirin,  lisinopril (hold if SBP<100), simvastatin      #Tobacco use - 39 pack years   - Chantix 1mg BID; nicotine patch+ gum  - smoking cessation consult     #Depression  - Continue PTA venlafaxine      #hx of R cranial nerve 3 palsy  - aspirin as above     #hx of migraine  - Continue PTA Amitriptyline     # Severe Obesity: Estimated body mass index is 42.23 kg/m  as calculated from the following:    Height as of this encounter: 1.626 m (5' 4\").    Weight as of this encounter: 111.6 kg (246 lb).         Diet: Combination Diet Regular Diet Adult    DVT Prophylaxis: Pneumatic Compression Devices  Singh Catheter: Not present  Fluids: None  Lines: None     Cardiac Monitoring: None  Code Status: Full Code      Disposition Plan Home     Expected Discharge Date: 03/02/2023      Destination: home  Discharge Comments: Not medically ready - still requiring O2, could send with 2L home O2 eventually but needs are still too high  Need home O2. 3 asst        The patient's care was discussed with the Attending Physician, Dr. Lehman.    Alannah Torres, MS3  Medical Student  Medicine Service, [address]  Securely message with Megathread (more info)  Text page via [facility] Paging/Directory   See signed in provider for up to date coverage information     Resident/Fellow Attestation   I, Verena Mock MD, was present with the medical/BOSSMAN student who participated in the service and in the documentation of the note.  I have verified the history and personally performed the physical exam and medical decision making.  I agree with the assessment and plan of care as documented in the note.      Verena Mock MD  PGY2  Date of Service (when I saw the patient): 02/28/23  ______________________________________________________________________    Interval History   Nursing notes reviewed. No acute events overnight. " Continues to need ~2-3 L O2, more with exertion. Feels good this morning. Overall feels SOB is improving, wants to go home soon but understands that we would like to see her oxygen needs improve signficantly. Appetite remains good. No chest or abdominal pain, no edema. No new concerns.    Physical Exam   Vital Signs: Temp: 97.7  F (36.5  C) Temp src: Oral BP: (!) 134/94 Pulse: 88   Resp: 18 SpO2: 94 % O2 Device: Nasal cannula Oxygen Delivery: 3 LPM  Weight: 240 lbs 1.3 oz    General Appearance: NAD, sitting up in bed  HEENT: nasal canula; no discharge; some nasal flaring  Respiratory: On 2-3L NC, bilateral wheezing, not moving a lot of air; mild work of breathing  Cardiovascular: RRR, normal S1/S2, no murmurs noted  GI: soft, non-tender, non-distended  Skin: No rashes, skin warm and dry  Other: No edema, 2+ DP bilaterally    Medical Decision Making   Please see A&P for additional details of medical decision making.      Data   ------------------------- PAST 24 HR DATA REVIEWED -----------------------------------------------    I have personally reviewed the following data over the past 24 hrs:    5.1  \   12.7   / 184     139 98 11.3 /  86   4.2 30 (H) 0.61 \       Imaging results reviewed over the past 24 hrs:   No results found for this or any previous visit (from the past 24 hour(s)).

## 2023-03-01 ENCOUNTER — APPOINTMENT (OUTPATIENT)
Dept: PHYSICAL THERAPY | Facility: CLINIC | Age: 58
DRG: 190 | End: 2023-03-01
Payer: COMMERCIAL

## 2023-03-01 VITALS
WEIGHT: 240.08 LBS | BODY MASS INDEX: 40.99 KG/M2 | SYSTOLIC BLOOD PRESSURE: 125 MMHG | HEART RATE: 94 BPM | DIASTOLIC BLOOD PRESSURE: 81 MMHG | TEMPERATURE: 98 F | OXYGEN SATURATION: 91 % | RESPIRATION RATE: 18 BRPM | HEIGHT: 64 IN

## 2023-03-01 DIAGNOSIS — J44.9 COPD (CHRONIC OBSTRUCTIVE PULMONARY DISEASE) (H): Primary | ICD-10-CM

## 2023-03-01 LAB
ANION GAP SERPL CALCULATED.3IONS-SCNC: 11 MMOL/L (ref 7–15)
BUN SERPL-MCNC: 10.7 MG/DL (ref 6–20)
CALCIUM SERPL-MCNC: 9.3 MG/DL (ref 8.6–10)
CHLORIDE SERPL-SCNC: 98 MMOL/L (ref 98–107)
CREAT SERPL-MCNC: 0.63 MG/DL (ref 0.51–0.95)
DEPRECATED HCO3 PLAS-SCNC: 30 MMOL/L (ref 22–29)
ERYTHROCYTE [DISTWIDTH] IN BLOOD BY AUTOMATED COUNT: 13.8 % (ref 10–15)
GFR SERPL CREATININE-BSD FRML MDRD: >90 ML/MIN/1.73M2
GLUCOSE SERPL-MCNC: 85 MG/DL (ref 70–99)
HCT VFR BLD AUTO: 42.8 % (ref 35–47)
HGB BLD-MCNC: 12.7 G/DL (ref 11.7–15.7)
MCH RBC QN AUTO: 27.2 PG (ref 26.5–33)
MCHC RBC AUTO-ENTMCNC: 29.7 G/DL (ref 31.5–36.5)
MCV RBC AUTO: 92 FL (ref 78–100)
PLATELET # BLD AUTO: 232 10E3/UL (ref 150–450)
POTASSIUM SERPL-SCNC: 4 MMOL/L (ref 3.4–5.3)
RBC # BLD AUTO: 4.67 10E6/UL (ref 3.8–5.2)
SODIUM SERPL-SCNC: 139 MMOL/L (ref 136–145)
WBC # BLD AUTO: 6.7 10E3/UL (ref 4–11)

## 2023-03-01 PROCEDURE — 250N000012 HC RX MED GY IP 250 OP 636 PS 637

## 2023-03-01 PROCEDURE — 85027 COMPLETE CBC AUTOMATED: CPT

## 2023-03-01 PROCEDURE — 99239 HOSP IP/OBS DSCHRG MGMT >30: CPT | Mod: GC | Performed by: STUDENT IN AN ORGANIZED HEALTH CARE EDUCATION/TRAINING PROGRAM

## 2023-03-01 PROCEDURE — 36415 COLL VENOUS BLD VENIPUNCTURE: CPT

## 2023-03-01 PROCEDURE — 97530 THERAPEUTIC ACTIVITIES: CPT | Mod: GP

## 2023-03-01 PROCEDURE — 97110 THERAPEUTIC EXERCISES: CPT | Mod: GP

## 2023-03-01 PROCEDURE — 82374 ASSAY BLOOD CARBON DIOXIDE: CPT

## 2023-03-01 PROCEDURE — 250N000013 HC RX MED GY IP 250 OP 250 PS 637

## 2023-03-01 PROCEDURE — 82310 ASSAY OF CALCIUM: CPT

## 2023-03-01 PROCEDURE — 97116 GAIT TRAINING THERAPY: CPT | Mod: GP

## 2023-03-01 PROCEDURE — 250N000011 HC RX IP 250 OP 636

## 2023-03-01 RX ORDER — PREDNISONE 20 MG/1
40 TABLET ORAL DAILY
Status: DISCONTINUED | OUTPATIENT
Start: 2023-03-01 | End: 2023-03-01 | Stop reason: HOSPADM

## 2023-03-01 RX ORDER — PREDNISONE 20 MG/1
40 TABLET ORAL DAILY
Qty: 6 TABLET | Refills: 0 | Status: SHIPPED | OUTPATIENT
Start: 2023-03-02 | End: 2023-03-05

## 2023-03-01 RX ORDER — ALBUTEROL SULFATE 1.25 MG/3ML
1.25 SOLUTION RESPIRATORY (INHALATION) EVERY 4 HOURS PRN
Qty: 90 ML | Refills: 3 | Status: CANCELLED | OUTPATIENT
Start: 2023-03-01 | End: 2023-03-21

## 2023-03-01 RX ORDER — IPRATROPIUM BROMIDE AND ALBUTEROL SULFATE 2.5; .5 MG/3ML; MG/3ML
1 SOLUTION RESPIRATORY (INHALATION) EVERY 4 HOURS PRN
Qty: 90 ML | Refills: 3 | Status: SHIPPED | OUTPATIENT
Start: 2023-03-01

## 2023-03-01 RX ORDER — BUDESONIDE AND FORMOTEROL FUMARATE DIHYDRATE 160; 4.5 UG/1; UG/1
2 AEROSOL RESPIRATORY (INHALATION) 2 TIMES DAILY
Qty: 6 G | Refills: 3 | Status: SHIPPED | OUTPATIENT
Start: 2023-03-01 | End: 2023-03-15

## 2023-03-01 RX ADMIN — VARENICLINE 1 MG: 1 TABLET, FILM COATED ORAL at 09:58

## 2023-03-01 RX ADMIN — VENLAFAXINE HYDROCHLORIDE 225 MG: 150 CAPSULE, EXTENDED RELEASE ORAL at 09:58

## 2023-03-01 RX ADMIN — PREDNISONE 40 MG: 20 TABLET ORAL at 09:57

## 2023-03-01 RX ADMIN — LISINOPRIL 10 MG: 10 TABLET ORAL at 09:58

## 2023-03-01 RX ADMIN — ASPIRIN 81 MG: 81 TABLET ORAL at 09:57

## 2023-03-01 RX ADMIN — ENOXAPARIN SODIUM 40 MG: 40 INJECTION SUBCUTANEOUS at 09:58

## 2023-03-01 RX ADMIN — NICOTINE 1 PATCH: 21 PATCH, EXTENDED RELEASE TRANSDERMAL at 14:26

## 2023-03-01 RX ADMIN — ACETAMINOPHEN 975 MG: 325 TABLET ORAL at 09:57

## 2023-03-01 RX ADMIN — UMECLIDINIUM BROMIDE AND VILANTEROL TRIFENATATE 1 PUFF: 62.5; 25 POWDER RESPIRATORY (INHALATION) at 11:17

## 2023-03-01 ASSESSMENT — ACTIVITIES OF DAILY LIVING (ADL)
ADLS_ACUITY_SCORE: 20

## 2023-03-01 NOTE — PLAN OF CARE
"Goal Outcome Evaluation:    /66 (BP Location: Right arm)   Pulse 105   Temp 97.9  F (36.6  C) (Oral)   Resp 18   Ht 1.626 m (5' 4\")   Wt 108.9 kg (240 lb 1.3 oz)   LMP  (LMP Unknown)   SpO2 91%   BMI 41.21 kg/m      Shift: 2102-7137  Isolation Status: Contact precautions   VS: Vitals stable, 2 L nasal cannula at rest, 2 L w/ activity, afebrile  Neuro: Alert and oriented x4   BG: No sugars   Labs: Awaiting AM labs   Cardiac: WDL  Pain/Nausea: Denies nausea, lower back pain managed by scheduled tylenol   Diet: Regular  IV Access: PIV x1 saline locked   GI/: Voiding adequately, last BM 2/28, no BM this shift   Skin: No issues   Mobility: Independent in room   Plan: Continue with POC and notify provider with any changes  "

## 2023-03-01 NOTE — PROGRESS NOTES
[facility]    Progress Note - Medicine Service, LISA TEAM 2       Date of Admission:  2/23/2023    Assessment & Plan   Danita Zambrano is a 57 year old female admitted on 2/23/2023. She has hx of 39 pack years of smoking, probable COPD, HTN, HLD and depression admitted with acute hypoxic hypercapnic resp failure in the setting of chronic untreated COPD.    Changes today:  - Home oxygen test today - needed 4L  - Continue PO prednisone, nebs  - Extended prednisone course for 9 total days (last dose 3/4)   - Oxygen requirement down to 1-2L at rest  - Care coordinator for home nebulizer  - will likely go home with symbicort  - ordered OP sleep study, Pulm f/u, and MMT referral; will need PFTs as outpatient     # Acute hypoxic hypercapnic resp failure  # Metapneumovirus infection  #Hx concerning for COPD  #Resp acidosis, resolved  Patient was feeling increasingly SOB with exertion at home in the few days prior to admission; was evaluated by paramedic and found to be hypoxic in 70s. Found to be acidotic at admission which eventually improved. Continues to be hypercapnic; satting 90+ on 5L.  CT chests suggestive of right upper lobe consolidation. Viral panel showing human Metapneumovirus.   Bed side POCUS unremarkable for LV dysfn. Urine legionella and strep pneum negative. VGB 2/24 with pCO2 68 and normal pH- patient likely lives at higher pCO2 and is compensating appropriately.  - Continue PTA albuterol MDI  - scheduled Duonebs QID (substituted with atrovent due to duoneb shortage)  - PO levofloxacin - completed 2/25  - s/p methylpred 125 mg 2/23 followed by PO prednisone 40mg daily X 9 days  - O2 goals >88  - sputum culture - not collected; pt has minimum sputum production  - COPD RT saw pt - suggested close follow up as outpatient -all ordered  > Sleep study, Pulm f/u, and MMT referral; will need PFTs as outpatient  > Insurance doesn't cover Spiriva; would cover Incruse  "Ellipta MDI; Symbicort and albuterol covered with lower copay; see pharm consult note  > addtnl smoking cessation related recs as below  - Aerobika flutter valve QID with RT  - 2/27 repeat BNP low; CXR unremarkable for pulmonary edema    #HTN  #HLD  - Continue PTA aspirin,  lisinopril (hold if SBP<100), simvastatin      #Tobacco use - 39 pack years   - Chantix 1mg BID; nicotine patch+ gum  - smoking cessation consult     #Depression  - Continue PTA venlafaxine      #hx of R cranial nerve 3 palsy  - aspirin as above     #hx of migraine  - Continue PTA Amitriptyline     # Severe Obesity: Estimated body mass index is 42.23 kg/m  as calculated from the following:    Height as of this encounter: 1.626 m (5' 4\").    Weight as of this encounter: 111.6 kg (246 lb).         Diet: Combination Diet Regular Diet Adult    DVT Prophylaxis: Pneumatic Compression Devices  Singh Catheter: Not present  Fluids: None  Lines: None     Cardiac Monitoring: None  Code Status: Full Code      Disposition Plan Home     Expected Discharge Date: 03/03/2023      Destination: home  Discharge Comments: Not medically ready - still requiring O2, could send with 2L home O2 eventually but needs are still too high  Need home O2. 3 asst  3/1: 2L of O2, COPD uncontrolled, have to repeat the walk test- reasses Friday?        The patient's care was discussed with the Attending Physician, Dr. Lehman.    Alannah Torres, MS3  Medical Student  Medicine Service, [address]  Securely message with B[employer]UD (more info)  Text page via [facility] Paging/Directory   See signed in provider for up to date coverage information     ______________________________________________________________________    Interval History   Nursing notes reviewed. No acute events overnight. Oxygen requirement has been slowly decreasing, down to 2L overnight, sats OK on 1L this morning. Per patient, she was able to go on a 15 minute " walk yesterday with 2L O2 and sats stayed above 90%. Overall, patient feels that she has not felt short of breath. No chest or abdominal pain, no edema. No new concerns. Planning for home oxygen test today.    Physical Exam   Vital Signs: Temp: 98.1  F (36.7  C) Temp src: Oral BP: 130/66 Pulse: 84   Resp: 18 SpO2: 93 % O2 Device: Nasal cannula Oxygen Delivery: 2 LPM  Weight: 240 lbs 1.3 oz    General Appearance: NAD, sitting up in bed  HEENT: nasal canula; no discharge; some nasal flaring  Respiratory: On 2-3L NC, scattered bilateral wheezing, not moving a lot of air; mild work of breathing  Cardiovascular: RRR, normal S1/S2, no murmurs noted  GI: soft, non-tender, non-distended  Skin: No rashes, skin warm and dry  Other: No edema, 2+ DP bilaterally    Medical Decision Making   Please see A&P for additional details of medical decision making.      Data   ------------------------- PAST 24 HR DATA REVIEWED -----------------------------------------------    I have personally reviewed the following data over the past 24 hrs:    6.7  \   12.7   / 232     139 98 10.7 /  85   4.0 30 (H) 0.63 \       Imaging results reviewed over the past 24 hrs:   No results found for this or any previous visit (from the past 24 hour(s)).

## 2023-03-01 NOTE — PROGRESS NOTES
Care Management Discharge Note    Discharge Date: 03/03/2023       Discharge Disposition: DME    Discharge Services: None    Discharge DME: Oxygen (Neb machine)    Discharge Transportation: family or friend will provide ([place])    Patient/family educated on Medicare website which has current facility and service quality ratings: yes    Education Provided on the Discharge Plan:  yes  Persons Notified of Discharge Plans: patient  Patient/Family in Agreement with the Plan: yes    Handoff Referral Completed: Yes    Additional Information:  Notified by [facility] 2 team that pt medically cleared for discharge today.  Pt will need home oxygen set up with portability as well as a new nebulizer machine.  Reviewed per chart review that O2 sats will need to be documented.  Provider confirmed order for O2 and neb machine have been placed and face to face has been documented.    Initiated referral to [facility] [phone].  Provided [facility] with bedside RN's contact information.  [facility] rep will reach out to BRAEDEN Murphy should they require additional information.     1700:  Notified by BRAEDEN Murphy that pt received portable O2 tank for discharge but the neb machine was not delivered.  Message left for on-call rep to call writer to confirm plan for delivery of neb machine.      1720: Received f/u call from [facility].  Reviewed that pt needs neb machine for discharge.  Drive will return to [facility] with neb machine for patient.      Rosalie Vail RN BSN, PHN, ACM-RN  7A RN Care Coordinator  Phone: [phone]  Pager [phone]    To contact the weekend RNCC  [place] (0800 [phone]) Saturday and Sunday    Units: 4A, 4C, 4E, 5A and 5B- Pager 1: [phone]    Units: 6A, 6B, 6C, 6D- Pager 2: [phone]    Units: 7A, 7B, 7C, 7D, and 5C-Pager 3: [phone]    [facility] ([phone]) Saturday and Sunday    Units: 5 Ortho, 8A, 10 ICU, & Pediatric Units-Pager 4: [phone]    3/1/2023 2:39 PM

## 2023-03-01 NOTE — CONSULTS
Dental Hygiene Consult Service        Danita Zambrano MRN# [id number]   YOB: 1965 Age: 57 year old     Date of Admission: 2/23/2023  PCP is Isaias, [facility]  Date of Service: 2/28/2023           Reason for Consult:   Referring MD & Reason for Visit: I was asked by No admitting provider for patient encounter., to see Danita Zambrano for oral assessment           History of Present Illness:   This patient is a 57 year old female with a history of 39 pack years of smoking, probable COPD, HTN, HLD and depression admitted with acute hypoxic hypercapnic resp failure in the setting of chronic untreated COPD..  Dental and oropharyngeal history is pertinent for COPD, tobacco use.  The patient reports one crown missing, current dental needs, but is seen regularly.             General Observations   Level of support Patient is fully independent   Patient currently in pain No   Patient wears dentures No   Current oral care routine Brushing daily   Patient has oral care products Toothbrush and Toothpaste   Extraoral assessment   General appearance Symmetrical and Normal TMJ function   Lymph nodes Symmetrical, no abnormalities, non-tender   Oral Health Assessment Tool (OHAT)   Lips 0=Healthy: smooth, pink, moist   Tongue 0=Healthy: normal, moist, roughness, pink   Gums and Tissues 0=Healthy: pink, moist, smooth, no bleeding   Saliva 0=Healthy: moist tissues, watery and free flowing saliva   Natural Teeth Yes/No 1=Changes: (ie. 1-3 decayed or broken teeth/roots or very worn down teeth) - missing teeth, missing crown   Dentures Yes/No Patient does not wear dentures   Oral Cleanliness 0=Healthy: clean and no food particles or tartar in mouth or dentures   Dental Pain 0=Healthy: no behavioural, verbal, or physical signs of dental pain   OHAT Total Score (0-16) 1   Other Dental Concerns None   Care provided during assessment Oral Assessment and Patient education   Follow up DH assessments required? No    Connect with [facility] or [place] care? No   Oral Care Plan - continue oral care plan as current: brushing twice - three times per day  - continual water intake for dryness              Assessment and Plan:   Assessment:  This patient is a 57 year old female with a history of 39 pack years of smoking, probable COPD, HTN, HLD and depression admitted with acute hypoxic hypercapnic resp failure in the setting of chronic untreated COPD., oral exam concerning for risk for pna, oral health optimization .      Plan:   - continue oral care plan as above  - pt sees DDS regularly, has treatment plan   - no other  concerns at this time.      Yelena Jacobsen, [facility]  Pager: [phone]      Past Medical History   I have reviewed this patient's medical history and updated it with pertinent information if needed.  Past Medical History:   Diagnosis Date     COPD (chronic obstructive pulmonary disease) (H)        Past Surgical History   I have reviewed this patient's surgical history and updated it with pertinent information if needed.  No past surgical history on file.    Social History   I have reviewed this patient's social history and updated it with pertinent information if needed.  Social History     Tobacco Use     Smoking status: Every Day     Packs/day: 1.00     Years: 39.00     Pack years: 39.00     Types: Cigarettes     Passive exposure: Never     Smokeless tobacco: Never     Tobacco comments:     2/24/2023 counseling done, using patch while admitted, Chantix at home, took workbook     Prior to Admission Medications   Prior to Admission Medications   Prescriptions Last Dose Informant Patient Reported? Taking?   albuterol (PROAIR HFA/PROVENTIL HFA/VENTOLIN HFA) 108 (90 Base) MCG/ACT inhaler 2/22/2023  Yes Yes   Sig: Inhale 2 puffs into the lungs every 4 hours as needed for shortness of breath or wheezing   amitriptyline (ELAVIL) 10 MG tablet 2/22/2023 at at night  Yes Yes   Sig: Take 10 mg by mouth At Bedtime   aspirin 81 MG EC  tablet 2/22/2023 at at night  Yes Yes   Sig: Take 81 mg by mouth daily   cetirizine (ZYRTEC) 10 MG tablet 2/22/2023 at at night  Yes Yes   Sig: Take 10 mg by mouth daily   hypromellose (ARTIFICIAL TEARS) 0.5 % SOLN ophthalmic solution 2/22/2023  Yes Yes   Sig: Place 1 drop into both eyes daily   ibuprofen (ADVIL/MOTRIN) 200 MG tablet Past Month  Yes Yes   Sig: Take 600 mg by mouth once as needed for pain   lisinopril (ZESTRIL) 10 MG tablet 2/22/2023 at at night  Yes Yes   Sig: Take 10 mg by mouth daily   simvastatin (ZOCOR) 20 MG tablet 2/22/2023 at at night  Yes Yes   Sig: Take 20 mg by mouth At Bedtime   tetrahydrozoline (VISINE) 0.05 % ophthalmic solution Past Week  Yes Yes   Sig: Place 1 drop into both eyes once as needed   varenicline (CHANTIX) 1 MG tablet Past Week at at night  Yes Yes   Sig: Take 1 mg by mouth daily   venlafaxine (EFFEXOR-ER) 225 MG 24 hr tablet 2/22/2023 at at night  Yes Yes   Sig: Take 225 mg by mouth daily   vitamin D3 (CHOLECALCIFEROL) 250 mcg (31012 units) capsule 2/22/2023 at at night  Yes Yes   Sig: Take 1 capsule by mouth daily      Facility-Administered Medications: None     Allergies   No Known Allergies  Physical Exam

## 2023-03-01 NOTE — PLAN OF CARE
Patient has been assessed for Home Oxygen needs.     Pulse oximetry (SpO2) and Oxygen flow readings:    SpO2 = 85% on room air at rest while awake.    SpO2 improved to 89% on 1L liters/minute at rest.    SpO2 = 66% on room air during activity/with exercise.    *SpO2 improved to 89% on 4L liters/minute during activity/with exercise.

## 2023-03-01 NOTE — PROGRESS NOTES
Oxygen Documentation    I certify that this patient, Danita Zambrano has been under my care (or a nurse practitioner or physican's assistant working with me). This is the face-to-face encounter for oxygen medical necessity.      At the time of this encounter supplemental oxygen is reasonable and necessary and is expected to improve the patient's condition in a home setting.       Patient has continued oxygen desaturation due to COPD J44.9.    If portability is ordered, is the patient mobile within the home? Yes    Nebulizer Documentation    I attest that I have seen and evaluated Danita Zambrano. She has a diagnosis of J44.1 - Chronic obstructive pulmonary disease with (acute) exacerbation and a nebulizer machine is needed to administer medication to improve breathing passages.     I, the undersigned, certify that the above prescribed supplies are medically necessary for this patient and is both reasonable and necessary in reference to accepted standards of medical and necessary in reference to accepted standards of medical practice in the treatment of this patient's condition and is not prescribed as a convenience.        Hina Paniagua MD  Internal Medicine PGY 1  [facility]

## 2023-03-01 NOTE — PLAN OF CARE
DISCHARGE:  D: Patient with orders to discharge to home.   I: Discharge instructions, medications, & follow ups reviewed with patient. Copy of discharge summary given to patient.  PIV removed. All belongings packed & sent with patient. Medications filled & sent to patient's preferred pharmacy.   A: Patient in stable condition. AVSS. Patient and family had no further questions regarding discharge instructions and medications. Patient transferred out by wheelchair & left with family.   P: Plan for patient to return home with oxygen and nebulizer.

## 2023-03-01 NOTE — DISCHARGE SUMMARY
[facility]  Discharge Summary - Medicine & Pediatrics       Date of Admission:  2/23/2023  Date of Discharge:  3/1/2023  Discharging Provider: Dr. Yulissa Lehman MD  Discharge Service: Medicine Service, LISA TEAM 2    Discharge Diagnoses   #Acute hypoxic hypercapnic resp failure  #Metapneumovirus infection  #COPD    #HTN  #HLD  #Tobacco use   #Depression  #hx of migraine  # Severe Obesity  #hx of R cranial nerve 3 palsy    Follow-ups Needed After Discharge   - PCP in 1-2 weeks: pt will be discharging with o2, nebs and will finish a 10 day course of prednisone; please assess current resp status and access to medications and oxygen  - Pulmonology 3-5 days  - Sleep study - referral  - PFTs as outpatient per pulm discretion      Unresulted Labs Ordered in the Past 30 Days of this Admission     No orders found from 1/24/2023 to 2/24/2023.      These results will be followed up by pulmonology and     See various DME/home meds documentation below    Oxygen documentation  I certify that this patient, Danita Zambrano has been under my care (or a nurse practitioner or physican's assistant working with me). This is the face-to-face encounter for oxygen medical necessity.       At the time of this encounter supplemental oxygen is reasonable and necessary and is expected to improve the patient's condition in a home setting.        Patient has continued oxygen desaturation due to COPD J44.9.     If portability is ordered, is the patient mobile within the home? Yes    Nebulizer Documentation  I attest that I have seen and evaluated Danita Zambrano. She has a diagnosis of J44.1 - Chronic obstructive pulmonary disease with (acute) exacerbation and a nebulizer machine is needed to administer medication to improve breathing passages.     I, the undersigned, certify that the above prescribed supplies are medically necessary for this patient and is both reasonable and necessary in reference  to accepted standards of medical and necessary in reference to accepted standards of medical practice in the treatment of this patient's condition and is not prescribed as a convenience.    Discharge Disposition   Discharged to home  Condition at discharge: Stable    Hospital Course   Danita Zambrano is a 57 year old female admitted on 2/23/2023. She has hx of 39 pack years of smoking, probable COPD, HTN, HLD and depression admitted with acute hypoxic hypercapnic resp failure in the setting of chronic untreated COPD.     # Acute hypoxic hypercapnic resp failure  # Metapneumovirus infection  #Hx concerning for COPD  #Resp acidosis, resolved  Patient was feeling increasingly SOB with exertion at home in the few days prior to admission; was evaluated by paramedic and found to be hypoxic in 70s. Found to be acidotic at admission which eventually improved. Continues to be hypercapnic; satting 90+ on 5L.  CT chests suggestive of right upper lobe consolidation. Viral panel showing human Metapneumovirus. Pt received 3 day course of levofloxacin. Pt was seen by COPD RT recommended various outpatient referrals as below. Side note: pt's BNP levels were normal at admission and CXR was not concerning for pulm edema.  - Continue PTA albuterol prn q2h MDI, Symbicort BID MDI  - home duo nebs q4h prn - see documentation above  - Continue steroids (total 10 day course; to finish on 3/4)  - Continue home O2 for O2 goals >88% (per walk test, pt will need 2L at rest and 4L with activities; after the acute phase is done, may be able to wean off of oxygen) - see documentation for home o2 order above  - Sleep study, Pulm f/u, and MMT referral; will need PFTs as outpatient  - Smoking cessation - see below     #HTN  #HLD  - Continue PTA aspirin,  lisinopril, simvastatin      #Tobacco use - 39 pack years   - Chantix 1mg BID; nicotine patch+ gum  - smoking cessation consulted     #Depression  - Continue PTA venlafaxine      #hx of R cranial  "nerve 3 palsy  - aspirin as above     #hx of migraine  - Continue PTA Amitriptyline     # Severe Obesity: Estimated body mass index is 42.23 kg/m  as calculated from the following:    Height as of this encounter: 1.626 m (5' 4\").    Weight as of this encounter: 111.6 kg (246 lb).       Consultations This Hospital Stay   IP RESPIRATORY CARE CHRONIC PULMONARY DISEASE SPECIALIST  SMOKING CESSATION PROGRAM IP CONSULT  PHYSICAL THERAPY ADULT IP CONSULT  OCCUPATIONAL THERAPY ADULT IP CONSULT  PHARMACY LIAISON FOR MEDICATION COVERAGE CONSULT  PHARMACY LIAISON FOR MEDICATION COVERAGE CONSULT  CARE MANAGEMENT / SOCIAL WORK IP CONSULT  DENTAL HYGIENE IP ADULT CONSULT - UU    Code Status   Full Code       The patient was discussed with MD SHAWN Roca MD [place] [facility] Service  [facility] [address] [address]  Phone: [phone]  ______________________________________________________________________    Physical Exam   Vital Signs: Temp: 98  F (36.7  C) Temp src: Oral BP: 125/81 Pulse: 94   Resp: 18 SpO2: 91 % O2 Device: Nasal cannula Oxygen Delivery: 2 LPM  Weight: 240 lbs 1.3 oz    General Appearance:  NAD, sitting up in bed  HEENT: nasal canula; no discharge; some nasal flaring  Respiratory: On 2-3L NC, scattered bilateral wheezing, not moving a lot of air; mild work of breathing  Cardiovascular: RRR, normal S1/S2, no murmurs noted  GI: soft, non-tender, non-distended  Skin: No rashes, skin warm and dry  Other:  No edema, 2+ DP bilaterally    Primary Care Physician   Itz Del Valle    Discharge Orders      Med Therapy Management Referral      Sleep Study Referral      Pulmonary Medicine Referral      Reason for your hospital stay    You were admitted due to significantly concerning low oxygen perfusion in the body due to underlying COPD conditions and due to infection from \"human metapneumonic virus\". You received oxygen, " antibiotics, nebulizations and steroids to help get through this. You made some progress but will require oxygen at home to ensure that you remain stable. Please come to ED if you are having significant difficulty with breathing even with current oxygen, cough with sputum and having fever/ chills.    At discharge  1. Please continue prednisone tablets in the morning - stop after 3/4/2023  2. Continued to use oxygen at home with rest (and with activity; pulse oxymeter will help you guide this. We are looking for a oxygen saturation level of 88% or above; if your pulse oximeter is showing reading below 88 consistently please seek medical care  3. See pulmonology in 3-5 days of discharge - this is EXTREMELY important  4. Get sleep study done - referral placed  5. Use nebulizer four times daily as needed when you are feeling short of breath  6. Use Symbicort inhaler 2 puffs two times daily, and albuterol 2 puffs every 2 hours as needed     Activity    Your activity upon discharge: activity as tolerated     Follow Up ([facility])    Follow up with primary care provider within 7 days to evaluate medication change, to evaluate treatment change, to evaluate after surgery, and for hospital follow- up.  No follow up labs or test are needed.      Follow up with [facility] PULMONOLOGY (lung) doctors within 3-5 days new COPD diagnosis and further management.  No follow up labs or test are needed.    Follow up with sleep study center - referral placed    Appointments on [place] and/or [facility] and the [facility] (with [facility] or [facility] provider or service). Call [phone] if you haven't heard regarding these appointments within 7 days of discharge.     Full Code     Oxygen Adult/Peds    Oxygen Documentation  I certify that this patient, Danita Zambrano has been under my care (or a nurse practitioner or physican's assistant working with me). This is the face-to-face encounter for oxygen medical necessity.      At the time of this encounter  supplemental oxygen is reasonable and necessary and is expected to improve the patient's condition in a home setting.       Patient has continued oxygen desaturation due to COPD J44.9.    If portability is ordered, is the patient mobile within the home? yes     Nebulizer and Nebulizer Supplies    Nebulizer Documentation  I attest that I have seen and evaluated Danita Zambrano. She has a diagnosis of J44.1 - Chronic obstructive pulmonary disease with (acute) exacerbation and a nebulizer machine is needed to administer medication to improve breathing passages.     I, the undersigned, certify that the above prescribed supplies are medically necessary for this patient and is both reasonable and necessary in reference to accepted standards of medical and necessary in reference to accepted standards of medical practice in the treatment of this patient's condition and is not prescribed as a convenience.     Diet    Follow this diet upon discharge: Orders Placed This Encounter      Combination Diet Regular Diet Adult       Significant Results and Procedures   Results for orders placed or performed during the hospital encounter of 02/23/23   XR Chest Port 1 View    Narrative    EXAM: XR CHEST PORT 1 VIEW  LOCATION: [facility]  DATE/TIME: 2/23/2023 6:28 AM    INDICATION: sob  COMPARISON: None.      Impression    IMPRESSION: Heart size is normal. Mild prominence of the basilar interstitium. Upper lung zones are clear. No pneumothorax. No visible pleural effusion.   POC US ECHO LIMITED    Impression    Limited Bedside Cardiac Ultrasound, performed and interpreted by me.   Indication: Shortness of Breath.  Parasternal long axis, parasternal short axis, apical 4 chamber and lung views were acquired.   Poor windows    Findings:    Global left ventricular function appears intact. No B lines    IMPRESSION: Poor overall views however no large pericardial effusion appears to be present.  No  significant pulmonary edema     CT Chest Pulmonary Embolism w Contrast    Narrative    EXAMINATION: CTA pulmonary angiogram, 2/23/2023 8:10 AM     COMPARISON: Chest x-ray 2/23/2023    HISTORY: SOB, ? new RAD on ECG, ? PE    TECHNIQUE: Volumetric helical acquisition of CT images of the chest  from the lung apices to the kidneys were acquired after the  administration of 75 mL of Isovue-370 IV contrast. Post-processed  multiplanar and/or MIP reformations were obtained, archived to PACS  and used in interpretation of this study.     FINDINGS:      Contrast bolus is: adequate.  Exam is negative for acute pulmonary  embolism.     Reflux of contrast into the IVC? no  Paradoxical bowing of the interventricular septum to the left? no    Lungs: The central tracheobronchial tree is patent. No pleural  effusion or pneumothorax. Bilateral patchy and tree-in-bud opacities,  greatest in the right upper lobe. Developing consolidation in the  right upper lobe. Mild bronchial wall thickening centrally. Mild  bibasilar atelectasis.    Mediastinum: Subcentimeter hypodensity in the left lobe of the thyroid  gland. The heart is not enlarged. No significant pericardial effusion.  Coronary artery calcification. The ascending aorta and main pulmonary  artery are normal in caliber. There are a few prominent mediastinal  lymph nodes, including a 1.7 x 1.1 cm right precarinal lymph node  (series 6, image 112), likely reactive.    Upper abdomen: Unremarkable.    Bones/Soft Tissues: Degenerative changes in the visualized spine. No  acute osseous abnormalities or suspicious bony lesions.      Impression    IMPRESSION:   1. No pulmonary embolism.  2. Bilateral patchy and tree-in-bud pulmonary opacities with  developing consolidation in the right upper lobe, likely representing  infection/inflammation. Recommend follow-up chest CT within 4 weeks to  try and document clearing.  3. Probable reactive enlarged mediastinal and right hilar lymph  "nodes.      In the event of a positive result for acute pulmonary embolism  resulting in right heart strain, consider calling the   [facility] hospital  for \"PERT (Pulmonary Embolism Response Team)  Activation?    PERT -- Pulmonary Embolism Response Team (Multidisciplinary team  including cardiology, interventional radiology, critical care,  hematology)    I have personally reviewed the examination and initial interpretation  and I agree with the findings.    CLAEB TIDWELL MD         SYSTEM ID:  [id number]   XR Chest Port 1 View    Narrative    Portable chest    INDICATION: Persistent hypoxia    COMPARISON: To/23/23    FINDINGS: Heart size normal. Scattered patchy and streaky densities in  the lower lung zones appears similar. No brandon new consolidations or  other opacities.      Impression    IMPRESSION: No interval change with possible edema or infection.    CALEB TIDWELL MD         SYSTEM ID:  [id number]       Discharge Medications   Current Discharge Medication List      START taking these medications    Details   budesonide-formoterol (SYMBICORT) 160-4.5 MCG/ACT Inhaler Inhale 2 puffs into the lungs 2 times daily for 90 days  Qty: 6 g, Refills: 3    Associated Diagnoses: COPD exacerbation (H)      ipratropium - albuterol 0.5 mg/2.5 mg/3 mL (DUONEB) 0.5-2.5 (3) MG/3ML neb solution Take 1 vial (3 mLs) by nebulization every 4 hours as needed for shortness of breath, wheezing or cough  Qty: 90 mL, Refills: 3    Associated Diagnoses: COPD exacerbation (H)      predniSONE (DELTASONE) 20 MG tablet Take 2 tablets (40 mg) by mouth daily for 3 days  Qty: 6 tablet, Refills: 0    Associated Diagnoses: COPD exacerbation (H)         CONTINUE these medications which have NOT CHANGED    Details   albuterol (PROAIR HFA/PROVENTIL HFA/VENTOLIN HFA) 108 (90 Base) MCG/ACT inhaler Inhale 2 puffs into the lungs every 4 hours as needed for shortness of breath or wheezing      amitriptyline (ELAVIL) 10 MG tablet Take 10 mg by " mouth At Bedtime      aspirin 81 MG EC tablet Take 81 mg by mouth daily      cetirizine (ZYRTEC) 10 MG tablet Take 10 mg by mouth daily      hypromellose (ARTIFICIAL TEARS) 0.5 % SOLN ophthalmic solution Place 1 drop into both eyes daily      ibuprofen (ADVIL/MOTRIN) 200 MG tablet Take 600 mg by mouth once as needed for pain      lisinopril (ZESTRIL) 10 MG tablet Take 10 mg by mouth daily      simvastatin (ZOCOR) 20 MG tablet Take 20 mg by mouth At Bedtime      tetrahydrozoline (VISINE) 0.05 % ophthalmic solution Place 1 drop into both eyes once as needed      varenicline (CHANTIX) 1 MG tablet Take 1 mg by mouth daily      venlafaxine (EFFEXOR-ER) 225 MG 24 hr tablet Take 225 mg by mouth daily      vitamin D3 (CHOLECALCIFEROL) 250 mcg (66208 units) capsule Take 1 capsule by mouth daily           Allergies   No Known Allergies

## 2023-03-02 ENCOUNTER — TELEPHONE (OUTPATIENT)
Dept: PULMONOLOGY | Facility: CLINIC | Age: 58
End: 2023-03-02
Payer: COMMERCIAL

## 2023-03-02 ENCOUNTER — PATIENT OUTREACH (OUTPATIENT)
Dept: CARE COORDINATION | Facility: CLINIC | Age: 58
End: 2023-03-02
Payer: COMMERCIAL

## 2023-03-02 NOTE — TELEPHONE ENCOUNTER
Spoke with as received communication from BRAEDEN Suazo, to see if patient could be evaluated by Pulmonary as a new patient in the next 3-5 days per referral. Here at the [facility], no openings however  pulm has opening on Monday. Pt needs to complete FPFT and spoke with REG Carlson, at  and no openings for PFT at  prior to appt. Discussed with pt and discussed that FPFT will need to be completed tomorrow and appt with Dr. Valle on Monday. Pt is agreeable to this and appt details confirmed with pt who requested itinerary be mailed to her home.

## 2023-03-02 NOTE — PROGRESS NOTES
[facility]    Background: Transitional Care Management program identified per system criteria and reviewed by [facility] Resource [place] team for possible outreach.    Assessment: Upon chart review, CCR Team member will not proceed with patient outreach related to this episode of Transitional Care Management program due to reason below:    Patient has active communication with a nurse, provider or care team for reason of post-hospital follow up plan.  Outreach call by CCRC team not indicated to minimize duplicative efforts.     Plan: Transitional Care Management episode addressed appropriately per reason noted above.      JULIA Landeros  [facility] Resource [facility]    *Connected Care Resource Team does NOT follow patient ongoing. Referrals are identified based on internal discharge reports and the outreach is to ensure patient has an understanding of their discharge instructions.

## 2023-03-03 NOTE — TELEPHONE ENCOUNTER
RECORDS RECEIVED FROM: internal /ce   DATE RECEIVED: 3.15.23    NOTES STATUS DETAILS   OFFICE NOTE from referring provider internal  Hina Catalan MD   OFFICE NOTE from other specialist ce [facility]- 3.15.22    DISCHARGE SUMMARY from hospital     DISCHARGE REPORT from the ER internal  2.23.23 [place]    MEDICATION LIST internal     IMAGING  (NEED IMAGES AND REPORTS)     CT SCAN internal /ce Internal 2.23.23    [facility]- 4.4.22   CHEST XRAY (CXR) internal  Internal 2.27.23, 2.23.23    [facility]- 3.15.22   TESTS     PULMONARY FUNCTION TESTING (PFT) internal  Scheduled 3.15.23        Action 3.3.23 sv    Action Taken Image request sent to [facility] for   CT- 4.4.22  CXR- 3.15.22  --received images--

## 2023-03-03 NOTE — PLAN OF CARE
Physical Therapy Discharge Summary    Reason for therapy discharge:    Discharged to home.    Progress towards therapy goal(s). See goals on Care Plan in [facility] electronic health record for goal details.  Goals met    Therapy recommendation(s):    No further therapy is recommended.

## 2023-03-09 ENCOUNTER — TELEPHONE (OUTPATIENT)
Dept: RESPIRATORY THERAPY | Facility: CLINIC | Age: 58
End: 2023-03-09
Payer: COMMERCIAL

## 2023-03-09 NOTE — TELEPHONE ENCOUNTER
Attempted to contact patient today for ongoing COPD education. There was no answer so a message was left that included our call back number should she have a need to contact us prior to our next call.    I included in my phone message a reminder about her upcoming appointment for PFT's and pulmonary medicine visit for next Wed. 3/15/23.    I will attempt to contact again in a couple of weeks.    Candie Yo, PIPER, RRT, CTTS  Chronic Pulmonary Disease Specialist  Office: [phone]   Pager: [phone]

## 2023-03-15 ENCOUNTER — PRE VISIT (OUTPATIENT)
Dept: PULMONOLOGY | Facility: CLINIC | Age: 58
End: 2023-03-15

## 2023-03-15 ENCOUNTER — OFFICE VISIT (OUTPATIENT)
Dept: PULMONOLOGY | Facility: CLINIC | Age: 58
End: 2023-03-15
Payer: COMMERCIAL

## 2023-03-15 VITALS
BODY MASS INDEX: 40.97 KG/M2 | HEART RATE: 95 BPM | OXYGEN SATURATION: 93 % | WEIGHT: 240 LBS | SYSTOLIC BLOOD PRESSURE: 134 MMHG | HEIGHT: 64 IN | DIASTOLIC BLOOD PRESSURE: 79 MMHG

## 2023-03-15 DIAGNOSIS — J44.1 COPD EXACERBATION (H): ICD-10-CM

## 2023-03-15 DIAGNOSIS — J44.9 COPD (CHRONIC OBSTRUCTIVE PULMONARY DISEASE) (H): ICD-10-CM

## 2023-03-15 PROCEDURE — 99204 OFFICE O/P NEW MOD 45 MIN: CPT | Mod: 25 | Performed by: INTERNAL MEDICINE

## 2023-03-15 PROCEDURE — 94726 PLETHYSMOGRAPHY LUNG VOLUMES: CPT | Performed by: INTERNAL MEDICINE

## 2023-03-15 PROCEDURE — 94375 RESPIRATORY FLOW VOLUME LOOP: CPT | Performed by: INTERNAL MEDICINE

## 2023-03-15 PROCEDURE — G0463 HOSPITAL OUTPT CLINIC VISIT: HCPCS | Mod: 25 | Performed by: INTERNAL MEDICINE

## 2023-03-15 PROCEDURE — 90677 PCV20 VACCINE IM: CPT | Performed by: INTERNAL MEDICINE

## 2023-03-15 PROCEDURE — 94729 DIFFUSING CAPACITY: CPT | Performed by: INTERNAL MEDICINE

## 2023-03-15 PROCEDURE — G0009 ADMIN PNEUMOCOCCAL VACCINE: HCPCS | Performed by: INTERNAL MEDICINE

## 2023-03-15 PROCEDURE — 250N000011 HC RX IP 250 OP 636: Performed by: INTERNAL MEDICINE

## 2023-03-15 RX ORDER — BUDESONIDE AND FORMOTEROL FUMARATE DIHYDRATE 160; 4.5 UG/1; UG/1
2 AEROSOL RESPIRATORY (INHALATION) 2 TIMES DAILY
Qty: 6 G | Refills: 11 | Status: SHIPPED | OUTPATIENT
Start: 2023-03-15 | End: 2023-11-28

## 2023-03-15 RX ADMIN — PNEUMOCOCCAL 20-VALENT CONJUGATE VACCINE 0.5 ML
2.2; 2.2; 2.2; 2.2; 2.2; 2.2; 2.2; 2.2; 2.2; 2.2; 2.2; 2.2; 2.2; 2.2; 2.2; 2.2; 4.4; 2.2; 2.2; 2.2 INJECTION, SUSPENSION INTRAMUSCULAR at 16:00

## 2023-03-15 ASSESSMENT — PAIN SCALES - GENERAL: PAINLEVEL: NO PAIN (0)

## 2023-03-15 NOTE — NURSING NOTE
Chief Complaint   Patient presents with     New Patient     New COPD     Vitals were taken and medications were reconciled.     Celina Figueroa RMA  2:54 PM

## 2023-03-15 NOTE — PROGRESS NOTES
Initial pulmonary evaluation    Date of service: 3/15/2023    Reason for evaluation: COPD exacerbation    History of presenting illness: 57-year-old female with 40-pack-year history who was admitted to the hospital from 2/23 through 3/1 for acute exacerbation of COPD.    No history of pulmonary symptoms until she developed COVID-19 in January 2022.  Did not require hospitalization, but after the diagnosis noticed she was becoming dyspneic with moderate activity.  She was given a rescue inhaler to use when symptomatic, and this was minimally effective.  In mid February 2023, started to develop progressively severe dyspnea.  Presented to emergency department on 2/23 in respiratory distress; when EMS arrived her SPO2 was 70% while breathing room air and she was placed on bilevel noninvasive ventilation.  After being hospitalized for several hours she was transition from bilevel to 5 L supplemental oxygen by nasal cannula.  Respiratory viral panel was positive for metapneumovirus, and she was treated with steroids, scheduled bronchodilators, and levofloxacin.  She discharged on 1 L supplemental oxygen continuously and 3 L supplemental oxygen with activity.    Since discharge, she has been feeling back to baseline.  Has not had a cigarette since prior to her hospitalization.    Past medical history/past surgical history:  1.  Obesity with BMI 41  2.  Essential hypertension  3.  COPD  4.  Depression    Current pulmonary medications:  1.  Budesonide-formoterol 160-4.52 puffs twice daily  2.  Albuterol as needed  3.  Ipratropium-albuterol as needed  4.  Chantix    Social history: Starting smoking when 14. Smoked pack per day. Last cigarette on 2/22. Using the chantix twice per day and has helped with cravings. . Second hand smoke growing up. All friends smoke. Live in [place].     Family history: Father with COPD.  Daughter with CF.    No known allergies    Complete review of systems performed and  "is otherwise negative    Exam:  /79 (BP Location: Right arm, Patient Position: Sitting, Cuff Size: Adult Large)   Pulse 95   Ht 1.626 m (5' 4\")   Wt 108.9 kg (240 lb)   LMP  (LMP Unknown)   SpO2 93%   BMI 41.20 kg/m    General: No distress  HEENT: Wearing supplemental oxygen by nasal cannula  Pulmonary: Diminished breath sounds throughout, no wheezing  Cardiovascular: Regular rhythm, normal rate, no murmurs  Skin: No cyanosis or clubbing  Neurologic: Alert, oriented    CT from February 2023 reviewed.  Notable for groundglass and nodular opacities in addition to tree-in-bud opacities, primarily in the right upper lobe.    Pulmonary function testing from today reviewed.  Moderate airflow obstruction without bronchodilator responsiveness.  Mild diffusion capacity defect.    Assessment/plan: 57-year-old female with 40-pack-year history who is recently diagnosed with COPD (GOLD 2B) and hospitalized for an acute exacerbation of COPD secondary to metapneumovirus infection from 2/23 through 3/1.  Discharged with ongoing requirement for supplemental oxygen, which she is still using.    1.  Continue with budesonide-formoterol for the time being.  Since her absolute eosinophil count is low and this is her first exacerbation, she may not require long-term inhaled corticosteroid.  Will readdress at next visit.    2.  Referral to pulmonary rehabilitation.    3.  6-minute walk test with O2 titration next week to determine if supplemental oxygen is still needed.    4.  PCV 20 administered in clinic today.  Scheduled for COVID booster next week.    5.  Repeat CT scan in 2 weeks to ensure improvement/resolution of radiographic abnormality seen on CT in February (this will represent a 4-week time interval).    6.  Congratulated Ms. Zambrano on the excellent job she is doing with smoking cessation.  Has not smoked since prior to her hospitalization and continues to use Chantix.  Emphasized that this is by far the most " important intervention for her COPD.    7.  Lung cancer screening: Will readdress at next visit.  Would not be due for CT for another 12 months (if acute changes from recent infection have all resolved, CT in 2 weeks can serve as her baseline).    Return to clinic in 3 months.  Patient seen and discussed with Dr. Rosa.    Abel Rios MD   Pulmonary fellow      I saw and evaluated patient with Fellow.  Case discussed - agree with note.  I reviewed today's PFT: mod airflow obstruction with mild diffusion defect.  I reviewed Feb chest CT: some tree in bud opacities RUL. Not a frequent exacerbator, so there is no need at this time for azithro or roflumilast.    GALILEO ROSA M.D.

## 2023-03-15 NOTE — PATIENT INSTRUCTIONS
Stay on the symbicort 2 puffs twice daily for now. Keep rinsing and spitting afterwards.   Pulmonary rehab referral.   Pneumonia vaccine today  COVID booster next week  Repeat CT in 2 weeks  Oxygen walk test next week  Follow-up in 3 months  Most important thing is what you're doing which is to stop smoking.

## 2023-03-15 NOTE — LETTER
3/15/2023         RE: Danita Zambrano  [address] [place] [address] [address]  [place] [id number]        Dear Colleague,    Thank you for referring your patient, Danita Zambrano, to the [facility] FOR LUNG SCIENCE AND [facility] [place]. Please see a copy of my visit note below.    Initial pulmonary evaluation    Date of service: 3/15/2023    Reason for evaluation: COPD exacerbation    History of presenting illness: 57-year-old female with 40-pack-year history who was admitted to the hospital from 2/23 through 3/1 for acute exacerbation of COPD.    No history of pulmonary symptoms until she developed COVID-19 in January 2022.  Did not require hospitalization, but after the diagnosis noticed she was becoming dyspneic with moderate activity.  She was given a rescue inhaler to use when symptomatic, and this was minimally effective.  In mid February 2023, started to develop progressively severe dyspnea.  Presented to emergency department on 2/23 in respiratory distress; when EMS arrived her SPO2 was 70% while breathing room air and she was placed on bilevel noninvasive ventilation.  After being hospitalized for several hours she was transition from bilevel to 5 L supplemental oxygen by nasal cannula.  Respiratory viral panel was positive for metapneumovirus, and she was treated with steroids, scheduled bronchodilators, and levofloxacin.  She discharged on 1 L supplemental oxygen continuously and 3 L supplemental oxygen with activity.    Since discharge, she has been feeling back to baseline.  Has not had a cigarette since prior to her hospitalization.    Past medical history/past surgical history:  1.  Obesity with BMI 41  2.  Essential hypertension  3.  COPD  4.  Depression    Current pulmonary medications:  1.  Budesonide-formoterol 160-4.52 puffs twice daily  2.  Albuterol as needed  3.  Ipratropium-albuterol as needed  4.  Chantix    Social history: Starting smoking when 14. Smoked pack per day. Last  "cigarette on 2/22. Using the chantix twice per day and has helped with cravings. . Second hand smoke growing up. All friends smoke. Live in [place].     Family history: Father with COPD.  Daughter with CF.    No known allergies    Complete review of systems performed and is otherwise negative    Exam:  /79 (BP Location: Right arm, Patient Position: Sitting, Cuff Size: Adult Large)   Pulse 95   Ht 1.626 m (5' 4\")   Wt 108.9 kg (240 lb)   LMP  (LMP Unknown)   SpO2 93%   BMI 41.20 kg/m    General: No distress  HEENT: Wearing supplemental oxygen by nasal cannula  Pulmonary: Diminished breath sounds throughout, no wheezing  Cardiovascular: Regular rhythm, normal rate, no murmurs  Skin: No cyanosis or clubbing  Neurologic: Alert, oriented    CT from February 2023 reviewed.  Notable for groundglass and nodular opacities in addition to tree-in-bud opacities, primarily in the right upper lobe.    Pulmonary function testing from today reviewed.  Moderate airflow obstruction without bronchodilator responsiveness.  Mild diffusion capacity defect.    Assessment/plan: 57-year-old female with 40-pack-year history who is recently diagnosed with COPD (GOLD 2B) and hospitalized for an acute exacerbation of COPD secondary to metapneumovirus infection from 2/23 through 3/1.  Discharged with ongoing requirement for supplemental oxygen, which she is still using.    1.  Continue with budesonide-formoterol for the time being.  Since her absolute eosinophil count is low and this is her first exacerbation, she may not require long-term inhaled corticosteroid.  Will readdress at next visit.    2.  Referral to pulmonary rehabilitation.    3.  6-minute walk test with O2 titration next week to determine if supplemental oxygen is still needed.    4.  PCV 20 administered in clinic today.  Scheduled for COVID booster next week.    5.  Repeat CT scan in 2 weeks to ensure improvement/resolution of radiographic " abnormality seen on CT in February (this will represent a 4-week time interval).    6.  Congratulated Ms. Zambrano on the excellent job she is doing with smoking cessation.  Has not smoked since prior to her hospitalization and continues to use Chantix.  Emphasized that this is by far the most important intervention for her COPD.    7.  Lung cancer screening: Will readdress at next visit.  Would not be due for CT for another 12 months (if acute changes from recent infection have all resolved, CT in 2 weeks can serve as her baseline).    Return to clinic in 3 months.  Patient seen and discussed with Dr. Rosa.    Abel Rios MD   Pulmonary fellow      I saw and evaluated patient with Fellow.  Case discussed - agree with note.  I reviewed today's PFT: mod airflow obstruction with mild diffusion defect.  I reviewed Feb chest CT: some tree in bud opacities RUL. Not a frequent exacerbator, so there is no need at this time for azithro or roflumilast.    GALILEO ROSA M.D.      Again, thank you for allowing me to participate in the care of your patient.        Sincerely,        Abel Rios MD

## 2023-03-20 LAB
DLCOUNC-%PRED-PRE: 76 %
DLCOUNC-PRE: 15.51 ML/MIN/MMHG
DLCOUNC-PRED: 20.39 ML/MIN/MMHG
ERV-%PRED-PRE: 111 %
ERV-PRE: 0.33 L
ERV-PRED: 0.29 L
EXPTIME-PRE: 7.04 SEC
FEF2575-%PRED-POST: 21 %
FEF2575-%PRED-PRE: 26 %
FEF2575-POST: 0.49 L/SEC
FEF2575-PRE: 0.6 L/SEC
FEF2575-PRED: 2.24 L/SEC
FEFMAX-%PRED-PRE: 64 %
FEFMAX-PRE: 4.1 L/SEC
FEFMAX-PRED: 6.4 L/SEC
FEV1-%PRED-PRE: 52 %
FEV1-PRE: 1.24 L
FEV1FEV6-PRE: 63 %
FEV1FEV6-PRED: 81 %
FEV1FVC-PRE: 62 %
FEV1FVC-PRED: 80 %
FEV1SVC-PRE: 63 %
FEV1SVC-PRED: 72 %
FIFMAX-PRE: 3.45 L/SEC
FVC-%PRED-PRE: 66 %
FVC-PRE: 1.98 L
FVC-PRED: 2.97 L
IC-%PRED-PRE: 53 %
IC-PRE: 1.63 L
IC-PRED: 3.02 L
VA-%PRED-PRE: 90 %
VA-PRE: 4.35 L
VC-%PRED-PRE: 58 %
VC-PRE: 1.95 L
VC-PRED: 3.31 L

## 2023-03-22 DIAGNOSIS — J44.1 COPD EXACERBATION (H): ICD-10-CM

## 2023-03-22 LAB
6 MIN WALK (FT): 1000 FT
6 MIN WALK (M): 305 M

## 2023-03-22 PROCEDURE — 94618 PULMONARY STRESS TESTING: CPT | Performed by: INTERNAL MEDICINE

## 2023-03-24 ENCOUNTER — TELEPHONE (OUTPATIENT)
Dept: PULMONOLOGY | Facility: CLINIC | Age: 58
End: 2023-03-24
Payer: COMMERCIAL

## 2023-03-24 DIAGNOSIS — J44.9 CHRONIC OBSTRUCTIVE PULMONARY DISEASE, UNSPECIFIED COPD TYPE (H): Primary | ICD-10-CM

## 2023-03-24 LAB — FIO2-PRE: 36 %

## 2023-03-24 NOTE — TELEPHONE ENCOUNTER
Spoke with pt to determine what she needed in terms of return to work form. She states that she just needs a letter from Dr. Rios that states whether or not it is okay to return to work (her short term disability is over on 3/31 and she is supposed to go back to work on 4/3). She also states that if she is to return to work, she would also need maybe clarification whether or not she may need to work from home partially as the oxygen equipment she has does not last the entire work day. Informed her I would reach out to Dr. Rios. Ideally would like letter on Monday but informed her we may need more time. She voiced understanding.

## 2023-03-27 DIAGNOSIS — J44.1 COPD EXACERBATION (H): Primary | ICD-10-CM

## 2023-03-28 NOTE — TELEPHONE ENCOUNTER
Pt returned call. She is fine receiving letter tomorrow (verified she would like it sent via email and email address verified). Clarified her DME company is [employer]. Discussed 6MWT O2 titration test in 1 month and this was arranged (appt details confirmed with pt).     Fax cover sheet, demographic sheet, signed POC order and most recent 6MWT test results faxed to [employer] [phone]

## 2023-03-28 NOTE — TELEPHONE ENCOUNTER
LVM with pt, providing direct call back to clarify how she would like her letter/who her current O2 company is. Requested a call back to discuss.

## 2023-03-29 ENCOUNTER — ANCILLARY PROCEDURE (OUTPATIENT)
Dept: CT IMAGING | Facility: CLINIC | Age: 58
End: 2023-03-29
Attending: INTERNAL MEDICINE
Payer: COMMERCIAL

## 2023-03-29 DIAGNOSIS — J44.1 COPD EXACERBATION (H): ICD-10-CM

## 2023-03-29 PROCEDURE — 71250 CT THORAX DX C-: CPT | Performed by: RADIOLOGY

## 2023-03-29 NOTE — TELEPHONE ENCOUNTER
Signed work letter emailed to patient (email address previously verified). LVM with direct call back with pt to inform her that email has been sent with this attachment.

## 2023-04-17 ENCOUNTER — HOSPITAL ENCOUNTER (OUTPATIENT)
Dept: CARDIAC REHAB | Facility: CLINIC | Age: 58
Discharge: HOME | End: 2023-04-17
Attending: INTERNAL MEDICINE
Payer: COMMERCIAL

## 2023-04-17 PROCEDURE — 94626 PHY/QHP OP PULM RHB W/MNTR: CPT

## 2023-05-03 ENCOUNTER — HOSPITAL ENCOUNTER (OUTPATIENT)
Dept: CARDIAC REHAB | Facility: CLINIC | Age: 58
Discharge: HOME | End: 2023-05-03
Attending: INTERNAL MEDICINE
Payer: COMMERCIAL

## 2023-05-03 DIAGNOSIS — J44.1 COPD EXACERBATION (H): ICD-10-CM

## 2023-05-03 PROCEDURE — 94625 PHY/QHP OP PULM RHB W/O MNTR: CPT

## 2023-05-10 ENCOUNTER — HOSPITAL ENCOUNTER (OUTPATIENT)
Dept: CARDIAC REHAB | Facility: CLINIC | Age: 58
Discharge: HOME | End: 2023-05-10
Attending: INTERNAL MEDICINE
Payer: COMMERCIAL

## 2023-05-10 PROCEDURE — 94625 PHY/QHP OP PULM RHB W/O MNTR: CPT

## 2023-05-22 ENCOUNTER — HOSPITAL ENCOUNTER (OUTPATIENT)
Dept: CARDIAC REHAB | Facility: CLINIC | Age: 58
Discharge: HOME | End: 2023-05-22
Attending: INTERNAL MEDICINE
Payer: COMMERCIAL

## 2023-05-22 PROCEDURE — 94625 PHY/QHP OP PULM RHB W/O MNTR: CPT

## 2023-06-05 ENCOUNTER — HOSPITAL ENCOUNTER (OUTPATIENT)
Dept: CARDIAC REHAB | Facility: CLINIC | Age: 58
Discharge: HOME | End: 2023-06-05
Attending: INTERNAL MEDICINE
Payer: COMMERCIAL

## 2023-06-05 PROCEDURE — 94625 PHY/QHP OP PULM RHB W/O MNTR: CPT

## 2023-06-21 ENCOUNTER — OFFICE VISIT (OUTPATIENT)
Dept: PULMONOLOGY | Facility: CLINIC | Age: 58
End: 2023-06-21
Attending: INTERNAL MEDICINE
Payer: COMMERCIAL

## 2023-06-21 VITALS — SYSTOLIC BLOOD PRESSURE: 126 MMHG | DIASTOLIC BLOOD PRESSURE: 81 MMHG | HEART RATE: 104 BPM | OXYGEN SATURATION: 94 %

## 2023-06-21 DIAGNOSIS — J44.9 CHRONIC OBSTRUCTIVE PULMONARY DISEASE, UNSPECIFIED COPD TYPE (H): Primary | ICD-10-CM

## 2023-06-21 PROCEDURE — G0463 HOSPITAL OUTPT CLINIC VISIT: HCPCS | Performed by: INTERNAL MEDICINE

## 2023-06-21 PROCEDURE — 99214 OFFICE O/P EST MOD 30 MIN: CPT | Mod: GC | Performed by: INTERNAL MEDICINE

## 2023-06-21 ASSESSMENT — PAIN SCALES - GENERAL: PAINLEVEL: NO PAIN (0)

## 2023-06-21 NOTE — LETTER
6/21/2023         RE: Danita Zambrano  [address] [place] [address] [address]  [place] [id number]        Dear Colleague,    Thank you for referring your patient, Danita Zambrano, to the [facility] FOR LUNG SCIENCE AND HEALTH CLINIC [place]. Please see a copy of my visit note below.    Follow-up pulmonary evaluation    Date of service: 6/21/2023     Reason for evaluation: COPD     History of presenting illness: 57-year-old female with 40-pack-year history who was initially seen in pulmonary clinic in March of 2023 following admitted to the hospital for AE-COPD from 2/23 through 3/1.     No history of pulmonary symptoms until she developed COVID-19 in January 2022.  Did not require hospitalization, but after the diagnosis noticed she was becoming dyspneic with moderate activity.  She was given a rescue inhaler to use when symptomatic, and this was minimally effective.      In mid February 2023, started to develop progressively severe dyspnea.  Presented to emergency department on 2/23 in respiratory distress; when EMS arrived her SPO2 was 70% while breathing room air and she was placed on bilevel noninvasive ventilation.  After being hospitalized for several hours she was transition from bilevel to 5 L supplemental oxygen by nasal cannula.  Respiratory viral panel was positive for metapneumovirus, and she was treated with steroids, scheduled bronchodilators, and levofloxacin.  She discharged on 1 L supplemental oxygen continuously and 3 L supplemental oxygen with activity.    Following discharge, she was back to baseline and had stopped smoking. Plan was to continue LAMA-LABA and refer to pulmonary rehabilitation.    Interval history: Feels like she is back to baseline. She is still using 2-3L oxygen with exertion but none at rest. Hasn't had to use her rescue inhaler at all and has had no further exacerbations. Is not smoking! Has back injury but was doing pulmonary rehabilitation prior to that.     Past  medical history/past surgical history:  1.  Obesity with BMI 41  2.  Essential hypertension  3.  COPD  4.  Depression    Current pulmonary medications:  1.  Budesonide-formoterol 160-4.52 puffs twice daily  2.  Albuterol as needed  3.  Ipratropium-albuterol as needed  4.  Chantix    Social history: Starting smoking when 14. Smoked pack per day. Last cigarette on 2/22. --currently on FMLA. Second hand smoke growing up. All friends smoke. Live in [place].     Family history: Father with COPD.  Daughter with CF.    No known allergies    Complete review of systems performed and is otherwise negative    Exam:  /81 (BP Location: Right arm, Patient Position: Sitting)   Pulse 104   LMP  (LMP Unknown)   SpO2 94%   General: No distress  HEENT: Wearing supplemental oxygen by nasal cannula  Pulmonary: Diminished breath sounds throughout, no wheezing  Cardiovascular: Regular rhythm, normal rate, no murmurs  Skin: No cyanosis or clubbing  Neurologic: Alert, oriented    CT from February 2023 reviewed.  Notable for groundglass and nodular opacities in addition to tree-in-bud opacities, primarily in the right upper lobe. Repeat CT from March 2023 shows interval clearing with some new consolidation in the bases--likely atelectasis; overall significant improvement.     Pulmonary function testing from last visit reviewed.reviewed.  Moderate airflow obstruction without bronchodilator responsiveness.  Mild diffusion capacity defect.    Assessment/plan: 57-year-old female with 40-pack-year history who is recently diagnosed with COPD (GOLD 2B) and hospitalized for an acute exacerbation of COPD secondary to metapneumovirus infection from 2/23 through 3/1.  Discharged with ongoing requirement for supplemental oxygen (3L) with exertion. Is back to her functional baseline and is no longer smoking.    1.  Continue with budesonide-formoterol for the time being.  Since her absolute eosinophil count is low and  this was her first exacerbation, she may not require long-term inhaled corticosteroid, but would not de-escalate with air quality being what it is right now (and ongoing need for supplemental oxygen).    2.  Continue with pulmonary rehabilitation (on temporary hiatus due to pulling her back).    3.  Repeat O2 titration. Was still requiring supplemental oxygen when this was performed in March.     4.  PCV 20 administered in clinic at time of last visit. Up-to-date on both influenza and COVID vaccines.     5.  Repeat CT scan in March showed significant improvement. Will repeat again in 6 months to document clearance of lower lobe infiltrate.     6.  Congratulated Ms. Zambrano on the excellent job she is doing with smoking cessation.  Has not smoked since prior to her hospitalization and continues to use Chantix.  Emphasized that this is by far the most important intervention for her COPD.    7.  Lung cancer screening: Will readdress imaging moving forward at next visit but is already scheduled for repeat CT scan in 6 months.     Return to clinic in 3 months.  Patient seen and discussed with Dr. Rosa.    Abel Rios MD   Pulmonary fellow      I saw and evaluated patient with Fellow.  Case discussed - agree with note.  I reviewed chest CT from Mar: resolution of opacities from Feb, but new nodular opacities in lower lobes.    GALILEO ROSA M.D.          Again, thank you for allowing me to participate in the care of your patient.        Sincerely,        Abel Rios MD

## 2023-06-21 NOTE — PROGRESS NOTES
Follow-up pulmonary evaluation    Date of service: 6/21/2023     Reason for evaluation: COPD     History of presenting illness: 57-year-old female with 40-pack-year history who was initially seen in pulmonary clinic in March of 2023 following admitted to the hospital for AE-COPD from 2/23 through 3/1.     No history of pulmonary symptoms until she developed COVID-19 in January 2022.  Did not require hospitalization, but after the diagnosis noticed she was becoming dyspneic with moderate activity.  She was given a rescue inhaler to use when symptomatic, and this was minimally effective.      In mid February 2023, started to develop progressively severe dyspnea.  Presented to emergency department on 2/23 in respiratory distress; when EMS arrived her SPO2 was 70% while breathing room air and she was placed on bilevel noninvasive ventilation.  After being hospitalized for several hours she was transition from bilevel to 5 L supplemental oxygen by nasal cannula.  Respiratory viral panel was positive for metapneumovirus, and she was treated with steroids, scheduled bronchodilators, and levofloxacin.  She discharged on 1 L supplemental oxygen continuously and 3 L supplemental oxygen with activity.    Following discharge, she was back to baseline and had stopped smoking. Plan was to continue LAMA-LABA and refer to pulmonary rehabilitation.    Interval history: Feels like she is back to baseline. She is still using 2-3L oxygen with exertion but none at rest. Hasn't had to use her rescue inhaler at all and has had no further exacerbations. Is not smoking! Has back injury but was doing pulmonary rehabilitation prior to that.     Past medical history/past surgical history:  1.  Obesity with BMI 41  2.  Essential hypertension  3.  COPD  4.  Depression    Current pulmonary medications:  1.  Budesonide-formoterol 160-4.52 puffs twice daily  2.  Albuterol as needed  3.  Ipratropium-albuterol as needed  4.  Chantix    Social  history: Starting smoking when 14. Smoked pack per day. Last cigarette on 2/22. --currently on FMLA. Second hand smoke growing up. All friends smoke. Live in [place].     Family history: Father with COPD.  Daughter with CF.    No known allergies    Complete review of systems performed and is otherwise negative    Exam:  /81 (BP Location: Right arm, Patient Position: Sitting)   Pulse 104   LMP  (LMP Unknown)   SpO2 94%   General: No distress  HEENT: Wearing supplemental oxygen by nasal cannula  Pulmonary: Diminished breath sounds throughout, no wheezing  Cardiovascular: Regular rhythm, normal rate, no murmurs  Skin: No cyanosis or clubbing  Neurologic: Alert, oriented    CT from February 2023 reviewed.  Notable for groundglass and nodular opacities in addition to tree-in-bud opacities, primarily in the right upper lobe. Repeat CT from March 2023 shows interval clearing with some new consolidation in the bases--likely atelectasis; overall significant improvement.     Pulmonary function testing from last visit reviewed.reviewed.  Moderate airflow obstruction without bronchodilator responsiveness.  Mild diffusion capacity defect.    Assessment/plan: 57-year-old female with 40-pack-year history who is recently diagnosed with COPD (GOLD 2B) and hospitalized for an acute exacerbation of COPD secondary to metapneumovirus infection from 2/23 through 3/1.  Discharged with ongoing requirement for supplemental oxygen (3L) with exertion. Is back to her functional baseline and is no longer smoking.    1.  Continue with budesonide-formoterol for the time being.  Since her absolute eosinophil count is low and this was her first exacerbation, she may not require long-term inhaled corticosteroid, but would not de-escalate with air quality being what it is right now (and ongoing need for supplemental oxygen).    2.  Continue with pulmonary rehabilitation (on temporary hiatus due to pulling her  back).    3.  Repeat O2 titration. Was still requiring supplemental oxygen when this was performed in March.     4.  PCV 20 administered in clinic at time of last visit. Up-to-date on both influenza and COVID vaccines.     5.  Repeat CT scan in March showed significant improvement. Will repeat again in 6 months to document clearance of lower lobe infiltrate.     6.  Congratulated Ms. Zambrano on the excellent job she is doing with smoking cessation.  Has not smoked since prior to her hospitalization and continues to use Chantix.  Emphasized that this is by far the most important intervention for her COPD.    7.  Lung cancer screening: Will readdress imaging moving forward at next visit but is already scheduled for repeat CT scan in 6 months.     Return to clinic in 3 months.  Patient seen and discussed with Dr. Rosa.    Abel Rios MD   Pulmonary fellow      I saw and evaluated patient with Fellow.  Case discussed - agree with note.  I reviewed chest CT from Mar: resolution of opacities from Feb, but new nodular opacities in lower lobes.    GALILEO ROSA M.D.

## 2023-06-21 NOTE — PATIENT INSTRUCTIONS
Oxygen titration in one month or as able  Continue with current inhalers  Follow-up in clinic in 6 months with repeat CT day of visit   Get your flu vaccine in the fall  Resume pulmonary rehab once back is better

## 2023-06-21 NOTE — NURSING NOTE
Chief Complaint   Patient presents with     Follow Up     3 month follow up      Vitals were taken and medications were reconciled.     Celina Figueroa RMA  3:29 PM

## 2023-07-13 ASSESSMENT — SLEEP AND FATIGUE QUESTIONNAIRES
HOW LIKELY ARE YOU TO NOD OFF OR FALL ASLEEP IN A CAR, WHILE STOPPED FOR A FEW MINUTES IN TRAFFIC: WOULD NEVER DOZE
HOW LIKELY ARE YOU TO NOD OFF OR FALL ASLEEP WHEN YOU ARE A PASSENGER IN A CAR FOR AN HOUR WITHOUT A BREAK: WOULD NEVER DOZE
HOW LIKELY ARE YOU TO NOD OFF OR FALL ASLEEP WHILE LYING DOWN TO REST IN THE AFTERNOON WHEN CIRCUMSTANCES PERMIT: SLIGHT CHANCE OF DOZING
HOW LIKELY ARE YOU TO NOD OFF OR FALL ASLEEP WHILE SITTING AND TALKING TO SOMEONE: WOULD NEVER DOZE
HOW LIKELY ARE YOU TO NOD OFF OR FALL ASLEEP WHILE SITTING AND READING: SLIGHT CHANCE OF DOZING
HOW LIKELY ARE YOU TO NOD OFF OR FALL ASLEEP WHILE SITTING QUIETLY AFTER LUNCH WITHOUT ALCOHOL: SLIGHT CHANCE OF DOZING
HOW LIKELY ARE YOU TO NOD OFF OR FALL ASLEEP WHILE SITTING INACTIVE IN A PUBLIC PLACE: WOULD NEVER DOZE
HOW LIKELY ARE YOU TO NOD OFF OR FALL ASLEEP WHILE WATCHING TV: MODERATE CHANCE OF DOZING

## 2023-07-14 ENCOUNTER — VIRTUAL VISIT (OUTPATIENT)
Dept: SLEEP MEDICINE | Facility: CLINIC | Age: 58
End: 2023-07-14
Payer: COMMERCIAL

## 2023-07-14 VITALS
WEIGHT: 235 LBS | HEIGHT: 64 IN | DIASTOLIC BLOOD PRESSURE: 70 MMHG | SYSTOLIC BLOOD PRESSURE: 119 MMHG | BODY MASS INDEX: 40.12 KG/M2

## 2023-07-14 DIAGNOSIS — Z99.81 DEPENDENCE ON SUPPLEMENTAL OXYGEN WHEN AMBULATING: ICD-10-CM

## 2023-07-14 DIAGNOSIS — J96.11 CHRONIC RESPIRATORY FAILURE WITH HYPOXIA AND HYPERCAPNIA (H): ICD-10-CM

## 2023-07-14 DIAGNOSIS — J44.9 CHRONIC OBSTRUCTIVE PULMONARY DISEASE, UNSPECIFIED COPD TYPE (H): ICD-10-CM

## 2023-07-14 DIAGNOSIS — G47.00 INSOMNIA, UNSPECIFIED TYPE: ICD-10-CM

## 2023-07-14 DIAGNOSIS — J96.12 CHRONIC RESPIRATORY FAILURE WITH HYPOXIA AND HYPERCAPNIA (H): ICD-10-CM

## 2023-07-14 DIAGNOSIS — E66.01 MORBID OBESITY WITH BMI OF 40.0-44.9, ADULT (H): ICD-10-CM

## 2023-07-14 DIAGNOSIS — R29.818 SUSPECTED SLEEP APNEA: Primary | ICD-10-CM

## 2023-07-14 PROBLEM — L57.8 ACTINIC SKIN DAMAGE: Status: ACTIVE | Noted: 2018-02-22

## 2023-07-14 PROBLEM — K57.30 DIVERTICULOSIS OF SIGMOID COLON: Status: ACTIVE | Noted: 2017-04-07

## 2023-07-14 PROBLEM — I10 HTN (HYPERTENSION): Status: ACTIVE | Noted: 2019-02-12

## 2023-07-14 PROBLEM — G43.009 MIGRAINE WITHOUT AURA AND WITHOUT STATUS MIGRAINOSUS, NOT INTRACTABLE: Status: ACTIVE | Noted: 2023-04-13

## 2023-07-14 PROBLEM — E78.5 HLD (HYPERLIPIDEMIA): Status: ACTIVE | Noted: 2019-02-12

## 2023-07-14 PROCEDURE — 99205 OFFICE O/P NEW HI 60 MIN: CPT | Mod: VID | Performed by: NURSE PRACTITIONER

## 2023-07-14 RX ORDER — ZOLPIDEM TARTRATE 5 MG/1
TABLET ORAL
Qty: 1 TABLET | Refills: 0 | Status: SHIPPED | OUTPATIENT
Start: 2023-07-14

## 2023-07-14 ASSESSMENT — PAIN SCALES - GENERAL: PAINLEVEL: NO PAIN (0)

## 2023-07-14 NOTE — NURSING NOTE
Has patient had flu shot for current/most recent flu season? If so, when? Yes: 10/14/2022        Is the patient currently in the state of [place]? YES    Visit mode:VIDEO    If the visit is dropped, the patient can be reconnected by: VIDEO VISIT: Text to cell phone: [phone]    Will anyone else be joining the visit? NO      How would you like to obtain your AVS? MyChart    Are changes needed to the allergy or medication list? NO    Reason for visit: Consult        Pilar Buenrostro

## 2023-07-14 NOTE — PATIENT INSTRUCTIONS
"          MY TREATMENT INFORMATION FOR SLEEP APNEA-  Danita Zambrano    DOCTOR : AMADOR Sanchez CNP    Am I having a sleep study at a sleep center?  --->Due to normal delays, you will be contacted within 2-4 weeks to schedule    Am I having a home sleep study?  --->Watch the video for the device you are using:    -/drop off device-   https://www.ItrybeforeIbuy.com/watch?v=yGGFBdELGhk    -Disposable device sent out require phone/computer application-   https://www.ItrybeforeIbuy.com/watch?v=BCce_vbiwxE      Frequently asked questions:  1. What is Obstructive Sleep Apnea (ADÁN)? ADÁN is the most common type of sleep apnea. Apnea means, \"without breath.\"  Apnea is most often caused by narrowing or collapse of the upper airway as muscles relax during sleep.   Almost everyone has occasional apneas. Most people with sleep apnea have had brief interruptions at night frequently for many years.  The severity of sleep apnea is related to how frequent and severe the events are.   2. What are the consequences of ADÁN? Symptoms include: feeling sleepy during the day, snoring loudly, gasping or stopping of breathing, trouble sleeping, and occasionally morning headaches or heartburn at night.  Sleepiness can be serious and even increase the risk of falling asleep while driving. Other health consequences may include development of high blood pressure and other cardiovascular disease in persons who are susceptible. Untreated ADÁN  can contribute to heart disease, stroke and diabetes.   3. What are the treatment options? In most situations, sleep apnea is a lifelong disease that must be managed with daily therapy. Medications are not effective for sleep apnea and surgery is generally not considered until other therapies have been tried. Your treatment is your choice . Continuous Positive Airway (CPAP) works right away and is the therapy that is effective in nearly everyone. An oral device to hold your jaw forward is usually the next most " reliable option. Other options include postioning devices (to keep you off your back), weight loss, and surgery including a tongue pacing device. There is more detail about some of these options below.  4. Are my sleep studies covered by insurance? Although we will request verification of coverage, we advise you also check in advance of the study to ensure there is coverage.    Important tips for those choosing CPAP and similar devices  For new devices, sign up for device DILIP to monitor your device for your followup visits  We encourage you to utilize the [employer] dilip or website (myAir web (resmed.com) ) to monitor your therapy progress and share the data with your healthcare team when you discuss your sleep apnea.                                                    Know your equipment:  CPAP is continuous positive airway pressure that prevents obstructive sleep apnea by keeping the throat from collapsing while you are sleeping. In most cases, the device is  smart  and can slowly self-adjusts if your throat collapses and keeps a record every day of how well you are treated-this information is available to you and your care team.  BPAP is bilevel positive airway pressure that keeps your throat open and also assists each breath with a pressure boost to maintain adequate breathing.  Special kinds of BPAP are used in patients who have inadequate breathing from lung or heart disease. In most cases, the device is  smart  and can slowly self-adjusts to assist breathing. Like CPAP, the device keeps a record of how well you are treated.  Your mask is your connection to the device. You get to choose what feels most comfortable and the staff will help to make sure if fits. Here: are some examples of the different masks that are available:       Key points to remember on your journey with sleep apnea:  Sleep study.  PAP devices often need to be adjusted during a sleep study to show that they are effective and adjusted  right.  Good tips to remember: Try wearing just the mask during a quiet time during the day so your body adapts to wearing it. A humidifier is recommended for comfort in most cases to prevent drying of your nose and throat. Allergy medication from your provider may help you if you are having nasal congestion.  Getting settled-in. It takes more than one night for most of us to get used to wearing a mask. Try wearing just the mask during a quiet time during the day so your body adapts to wearing it. A humidifier is recommended for comfort in most cases. Our team will work with you carefully on the first day and will be in contact within 4 days and again at 2 and 4 weeks for advice and remote device adjustments. Your therapy is evaluated by the device each day.   Use it every night. The more you are able to sleep naturally for 7-8 hours, the more likely you will have good sleep and to prevent health risks or symptoms from sleep apnea. Even if you use it 4 hours it helps. Occasionally all of us are unable to use a medical therapy, in sleep apnea, it is not dangerous to miss one night.   Communicate. Call our skilled team on the number provided on the first day if your visit for problems that make it difficult to wear the device. Over 2 out of 3 patients can learn to wear the device long-term with help from our team. Remember to call our team or your sleep providers if you are unable to wear the device as we may have other solutions for those who cannot adapt to mask CPAP therapy. It is recommended that you sleep your sleep provider within the first 3 months and yearly after that if you are not having problems.   Use it for your health. We encourage use of CPAP masks during daytime quiet periods to allow your face and brain to adapt to the sensation of CPAP so that it will be a more natural sensation to awaken to at night or during naps. This can be very useful during the first few weeks or months of adapting to CPAP  though it does not help medically to wear CPAP during wakefulness and  should not be used as a strategy just to meet guidelines.  Take care of your equipment. Make sure you clean your mask and tubing using directions every day and that your filter and mask are replaced as recommended or if they are not working.     BESIDES CPAP, WHAT OTHER THERAPIES ARE THERE?    Positioning Device  Positioning devices are generally used when sleep apnea is mild and only occurs on your back.This example shows a pillow that straps around the waist. It may be appropriate for those whose sleep study shows milder sleep apnea that occurs primarily when lying flat on one's back. Preliminary studies have shown benefit but effectiveness at home may need to be verified by a home sleep test. These devices are generally not covered by medical insurance.  Examples of devices that maintain sleeping on the back to prevent snoring and mild sleep apnea.    Belt type body positioner  http://[employer]/    Electronic reminder  http://nightshifttherapy.[employer]/            Oral Appliance  What is oral appliance therapy?  An oral appliance device fits on your teeth at night like a retainer used after having braces. The device is made by a specialized dentist and requires several visits over 1-2 months before a manufactured device is made to fit your teeth and is adjusted to prevent your sleep apnea. Once an oral device is working properly, snoring should be improved. A home sleep test may be recommended at that time if to determine whether the sleep apnea is adequately treated.       Some things to remember:  -Oral devices are often, but not always, covered by your medical insurance. Be sure to check with your insurance provider.   -If you are referred for oral therapy, you will be given a list of specialized dentists to consider or you may choose to visit the Web site of the American Academy of Dental Sleep Medicine  -Oral devices are less likely to work  if you have severe sleep apnea or are extremely overweight.     More detailed information  An oral appliance is a small acrylic device that fits over the upper and lower teeth  (similar to a retainer or a mouth guard). This device slightly moves jaw forward, which moves the base of the tongue forward, opens the airway, improves breathing for effective treat snoring and obstructive sleep apnea in perhaps 7 out of 10 people .  The best working devices are custom-made by a dental device  after a mold is made of the teeth 1, 2, 3.  When is an oral appliance indicated?  Oral appliance therapy is recommended as a first-line treatment for patients with primary snoring, mild sleep apnea, and for patients with moderate sleep apnea who prefer appliance therapy to use of CPAP4, 5. Severity of sleep apnea is determined by sleep testing and is based on the number of respiratory events per hour of sleep.   How successful is oral appliance therapy?  The success rate of oral appliance therapy in patients with mild sleep apnea is 75-80% while in patients with moderate sleep apnea it is 50-70%. The chance of success in patients with severe sleep apnea is 40-50%. The research also shows that oral appliances have a beneficial effect on the cardiovascular health of ADÁN patients at the same magnitude as CPAP therapy7.  Oral appliances should be a second-line treatment in cases of severe sleep apnea, but if not completely successful then a combination therapy utilizing CPAP plus oral appliance therapy may be effective. Oral appliances tend to be effective in a broad range of patients although studies show that the patients who have the highest success are females, younger patients, those with milder disease, and less severe obesity. 3, 6.   Finding a dentist that practices dental sleep medicine  Specific training is available through the American Academy of Dental Sleep Medicine for dentists interested in working in the field  of sleep. To find a dentist who is educated in the field of sleep and the use of oral appliances, near you, visit the Web site of the American Academy of Dental Sleep Medicine.    References  1. Dominic et al. Objectively measured vs self-reported compliance during oral appliance therapy for sleep-disordered breathing. Chest 2013; 144(5): 5517-1080.  2. Omaira, et al. Objective measurement of compliance during oral appliance therapy for sleep-disordered breathing. Thorax 2013; 68(1): 91-96.  3. Janette et al. Mandibular advancement devices in 620 men and women with ADÁN and snoring: tolerability and predictors of treatment success. Chest 2004; 125: 2190-8679.  4. Chacha et al. Oral appliances for snoring and ADÁN: a review. Sleep 2006; 29: 244-262.  5. Jim et al. Oral appliance treatment for ADÁN: an update. J Clin Sleep Med 2014; 10(2): 215-227.  6. Justin et al. Predictors of OSAH treatment outcome. J Dent Res 2007; 86: 8460-2264.      Weight Loss:    Weight loss is a long-term strategy that may improve sleep apnea in some patients.    Weight management is a personal decision and the decision should be based on your interest and the potential benefits.  If you are interested in exploring weight loss strategies, the following discussion covers the impact on weight loss on sleep apnea and the approaches that may be successful.    Being overweight does not necessarily mean you will have health consequences.  Those who have BMI over 35 or over 27 with existing medical conditions carries greater risk.   Weight loss decreases severity of sleep apnea in most people with obesity. For those with mild obesity who have developed snoring with weight gain, even 15-30 pound weight loss can improve and occasionally eliminate sleep apnea.  Structured and life-long dietary and health habits are necessary to lose weight and keep healthier weight levels.     Though there may be significant health benefits from  weight loss, long-term weight loss is very difficult to achieve- studies show success with dietary management in less than 10% of people. In addition, substantial weight loss may require years of dietary control and may be difficult if patients have severe obesity. In these cases, surgical management may be considered.  Finally, older individuals who have tolerated obesity without health complications may be less likely to benefit from weight loss strategies.      Your BMI is Body mass index is 40.34 kg/m .    What is BMI?  Body mass index (BMI) is one way to tell whether you are at a healthy weight, overweight, or obese. It measures your weight in relation to your height.  A BMI of 18.5 to 24.9 is in the healthy range. A person with a BMI of 25 to 29.9 is considered overweight, and someone with a BMI of 30 or greater is considered obese.  Another way to find out if you are at risk for health problems caused by overweight and obesity is to measure your waist. If you are a woman and your waist is more than 35 inches, or if you are a man and your waist is more than 40 inches, your risk of disease may be higher.  More than two-thirds of American adults are considered overweight or obese. Being overweight or obese increases the risk for further weight gain.  Excess weight may lead to heart disease and diabetes. Creating and following plans for healthy eating and physical activity may help you improve your health.    Methods for maintaining or losing weight.  Weight control is part of healthy lifestyle and includes exercise, emotional health, and healthy eating habits.  Careful eating habits lifelong is the mainstay of weight control.  Though there are significant health benefits from weight loss, long-term weight loss with diet alone may be very difficult to achieve- studies show long-term success with dietary management in less than 10% of people. Attaining a healthy weight may be especially difficult to achieve in  those with severe obesity. In some cases, medications, devices and surgical management might be considered.    What can you do?  If you are overweight or obese and are interested in methods for weight loss, you should discuss this with your provider. In addition, we recommend that you review healthy life styles and methods for weight loss available through the National Institutes of Health patient information sites:   http://win.niddk.nih.gov/publications/index.htm    Surgery:    Surgery for obstructive sleep apnea is considered generally only when other therapies fail to work. Surgery may be discussed with you if you are having a difficult time tolerating CPAP and or when there is an abnormal structure that requires surgical correction.  Nose and throat surgeries often enlarge the airway to prevent collapse.  Most of these surgeries create pain for 1-2 weeks and up to half of the most common surgeries are not effective throughout life.  You should carefully discuss the benefits and drawbacks to surgery with your sleep provider and surgeon to determine if it is the best solution for you.   More information  Surgery for ADÁN is directed at areas that are responsible for narrowing or complete obstruction of the airway during sleep.  There are a wide range of procedures available to enlarge and/or stabilize the airway to prevent blockage of breathing in the three major areas where it can occur: the palate, tongue, and nasal regions.  Successful surgical treatment depends on the accurate identification of the factors responsible for obstructive sleep apnea in each person.  A personalized approach is required because there is no single treatment that works well for everyone.  Because of anatomic variation, consultation with an examination by a sleep surgeon is a critical first step in determining what surgical options are best for each patient.  In some cases, examination during sedation may be recommended in order to  guide the selection of procedures.  Patients will be counseled about risks and benefits as well as the typical recovery course after surgery. Surgery is typically not a cure for a person s ADÁN.  However, surgery will often significantly improve one s ADÁN severity (termed  success rate ).  Even in the absence of a cure, surgery will decrease the cardiovascular risk associated with OSA7; improve overall quality of life8 (sleepiness, functionality, sleep quality, etc).      Palate Procedures:  Patients with ADÁN often have narrowing of their airway in the region of their tonsils and uvula.  The goals of palate procedures are to widen the airway in this region as well as to help the tissues resist collapse.  Modern palate procedure techniques focus on tissue conservation and soft tissue rearrangement, rather than tissue removal.  Often the uvula is preserved in this procedure. Residual sleep apnea is common in patient after pharyngoplasty with an average reduction in sleep apnea events of 33%2.      Tongue Procedures:  ExamWhile patients are awake, the muscles that surround the throat are active and keep this region open for breathing. These muscles relax during sleep, allowing the tongue and other structures to collapse and block breathing.  There are several different tongue procedures available.  Selection of a tongue base procedure depends on characteristics seen on physical exam.  Generally, procedures are aimed at removing bulky tissues in this area or preventing the back of the tongue from falling back during sleep.  Success rates for tongue surgery range from 50-62%3.    Hypoglossal Nerve Stimulation:  Hypoglossal nerve stimulation has recently received approval from the [place] Food and Drug Administration for the treatment of obstructive sleep apnea.  This is based on research showing that the system was safe and effective in treating sleep apnea6.  Results showed that the median AHI score decreased 68%,  from 29.3 to 9.0. This therapy uses an implant system that senses breathing patterns and delivers mild stimulation to airway muscles, which keeps the airway open during sleep.  The system consists of three fully implanted components: a small generator (similar in size to a pacemaker), a breathing sensor, and a stimulation lead.  Using a small handheld remote, a patient turns the therapy on before bed and off upon awakening.    Candidates for this device must be greater than 18 years of age, have moderate to severe ADÁN (AHI between 15-65), BMI less than 35, have tried CPAP/oral appliance for at least 8 weeks without success, and have appropriate upper airway anatomy (determined by a sleep endoscopy performed by Dr. Efrem Hager).    Hypoglossal Nerve Stimulation Pathway:    The sleep surgeon s office will work with the patient through the insurance prior-authorization process (including communications and appeals).    Nasal Procedures:  Nasal obstruction can interfere with nasal breathing during the day and night.  Studies have shown that relief of nasal obstruction can improve the ability of some patients to tolerate positive airway pressure therapy for obstructive sleep apnea1.  Treatment options include medications such as nasal saline, topical corticosteroid and antihistamine sprays, and oral medications such as antihistamines or decongestants. Non-surgical treatments can include external nasal dilators for selected patients. If these are not successful by themselves, surgery can improve the nasal airway either alone or in combination with these other options.      Combination Procedures:  Combination of surgical procedures and other treatments may be recommended, particularly if patients have more than one area of narrowing or persistent positional disease.  The success rate of combination surgery ranges from 66-80%2,3.    References  Verito ALVARENGA. The Role of the Nose in Snoring and Obstructive Sleep Apnoea: An  Update.  Eur Arch Otorhinolaryngol. 2011; 268: 1365-73.   Behzad SM; Rashel JA; Marcos JR; Pallanch JF; Cherelle MB; Geovanni SG; Marjan NORMAN. Surgical modifications of the upper airway for obstructive sleep apnea in adults: a systematic review and meta-analysis. SLEEP 2010;33(10):4969-3131. Tang MEMBRENO. Hypopharyngeal surgery in obstructive sleep apnea: an evidence-based medicine review.  Arch Otolaryngol Head Neck Surg. 2006 Feb;132(2):206-13.  Abel YH1, Rich Y, Albin LOAN. The efficacy of anatomically based multilevel surgery for obstructive sleep apnea. Otolaryngol Head Neck Surg. 2003 Oct;129(4):327-35.  Kezirian E, Goldberg A. Hypopharyngeal Surgery in Obstructive Sleep Apnea: An Evidence-Based Medicine Review. Arch Otolaryngol Head Neck Surg. 2006 Feb;132(2):206-13.  Richelle NUNEZ et al. Upper-Airway Stimulation for Obstructive Sleep Apnea.  N Engl J Med. 2014 Jan 9;370(2):139-49.  Pepatricio Y et al. Increased Incidence of Cardiovascular Disease in Middle-aged Men with Obstructive Sleep Apnea. Am J Respir Crit Care Med; 2002 166: 159-165  Castillo EM et al. Studying Life Effects and Effectiveness of Palatopharyngoplasty (SLEEP) study: Subjective Outcomes of Isolated Uvulopalatopharyngoplasty. Otolaryngol Head Neck Surg. 2011; 144: 623-631.        WHAT IF I ONLY HAVE SNORING?    Mandibular advancement devices, lateral sleep positioning, long-term weight loss and treatment of nasal allergies have been shown to improve snoring.  Exercising tongue muscles with a game (https://apps.[employer]/us/dilip/soundly-reduce-snoring/dp8056130410) or stimulating the tongue during the day with a device (https://doi.org/10.3390/jxr17904898) have improved snoring in some individuals.    Remember to Drive Safe... Drive Alive     Sleep health profoundly affects your health, mood, and your safety.  Thirty three percent of the population (one in three of us) is not getting enough sleep and many have a sleep disorder. Not getting enough sleep or  having an untreated / undertreated sleep condition may make us sleepy without even knowing it. In fact, our driving could be dramatically impaired due to our sleep health. As your provider, here are some things I would like you to know about driving:     Here are some warning signs for impairment and dangerous drowsy driving:              -Having been awake more than 16 hours               -Looking tired               -Eyelid drooping              -Head nodding (it could be too late at this point)              -Driving for more than 30 minutes     Some things you could do to make the driving safer if you are experiencing some drowsiness:              -Stop driving and rest              -Call for transportation              -Make sure your sleep disorder is adequately treated     Some things that have been shown NOT to work when experiencing drowsiness while driving:              -Turning on the radio              -Opening windows              -Eating any  distracting  /  entertaining  foods (e.g., sunflower seeds, candy, or any other)              -Talking on the phone      Your decision may not only impact your life, but also the life of others. Please, remember to drive safe for yourself and all of us.

## 2023-07-14 NOTE — PROGRESS NOTES
Virtual Visit Details    Type of service:  Video Visit     Originating Location (pt. Location): Home    Distant Location (provider location):  Off-site  Platform used for Video Visit: [facility]     Outpatient Sleep Medicine Consultation:      Name: Danita Zambrano MRN# [id number]   Age: 58 year old YOB: 1965     Date of Consultation: July 14, 2023  Consultation is requested by: No referring provider defined for this encounter. No ref. provider found  Primary care provider: Itz Del Valle       Reason for Sleep Consult:     Danita Zambrano is sent by No ref. provider found for a sleep consultation regarding possible ADÁN/Hypoventilation/hypoxemia in setting of recent acute hypoxic hypercapnic resp failure/COPD exac.    Patient s Reason for visit  Danita Zambrano main reason for visit: They think I have COPD. I have quit smoking and now i do not cough all night  Patient states problem(s) started: 3 months ago when i went to the hospital, but i did not think i had a problem then either  Danita Zambrano's goals for this visit: The doctor ordered it. I m not really sure why.           Assessment and Plan:     Summary Sleep Diagnoses:  1. Suspected sleep apnea  2. Chronic respiratory failure with hypoxia and hypercapnia (H)  3. Chronic obstructive pulmonary disease, unspecified COPD type (H)  4. Dependence on supplemental oxygen when ambulating  5. Insomnia, unspecified type  6. Morbid obesity with BMI of 40.0-44.9, adult (H)  - Comprehensive Sleep Study; Future  - zolpidem (AMBIEN) 5 MG tablet; Take tablet by mouth 15 minutes prior to sleep, for Sleep Study  Dispense: 1 tablet; Refill: 0    Comorbid Diagnoses:  1.  HTN  2.  COPD  3.  Major depression  4.  Nicotine dependence-in remission      Summary Recommendations:  1.  Recommend further evaluation with diagnostic in lab split-night polysomnogram (PSG) with TCM for possible sleep disordered breathing, hypoventilation, and hypoxemia and hypoxic hypercapnic  respiratory failure secondary to COPD and morbid obesity.  Her STOP-BANG score is 3 with suggest a low risk of ADÁN, however, this patient does sleep alone so her symptoms may be underestimated.  2.  She continues to use supplemental oxygen at 2 L/min with ambulation due to dyspnea and hypoxemia related to COPD.  Currently, not using oxygen with sleep.  3.  All potential therapeutic options including positive airway pressure, mandibular advancing oral appliances, and surgical options were discussed. Also counseled about impact of weight loss on ADÁN.   4.  Patient with situational insomnia and concerns about poor sleep for the sleep study.  A Rx for zolpidem 5 mg p.o. nightly x1 for the night of the sleep study was sent to her pharmacy today.  She was instructed to bring this with her on the night of the CPAP.  5.  Follow-up in approximately 2 weeks after sleep study to review the results and to determine next steps.    Orders Placed This Encounter   Procedures     Comprehensive Sleep Study         Summary Counseling:    Sleep Testing Reviewed  Obstructive Sleep Apnea Reviewed  Complications of Untreated Sleep Apnea Reviewed  Previous recent chart notes, lab, imaging, and PFT results reviewed    Medical Decision-making:   Educational materials provided in instructions    Total time spent reviewing medical records, history and physical examination, review of previous testing and interpretation as well as documentation on this date: 60 minutes    CC: No ref. provider found          History of Present Illness:     Danita HUANG Kenya is a 58 year old female with a PMH pertinent for probable COPD, HTN, HLD, depression, nicotine dependence - in remission, morbid obesity and recent hospitalization (2/23/23 - 3/01/23) at [facility] with acute hypoxic hypercapnic resp failure and metapneumovirus infection who presents for further evaluation of possible sleep apnea, hypoventilation, and hypoxemia in the setting of COPD and recent acute  hypoxic hypercapnic respiratory failure.     Past Sleep Evaluations: No    SLEEP-WAKE SCHEDULE:     Work/School Days: Patient goes to school/work: Yes, works day hours, customer servic   Usually gets into bed at 10 pm  Takes patient about 10 min to fall asleep  Has trouble falling asleep Maybe once on Sun night as i had slept in that morning nights per week  Wakes up in the middle of the night Once or twice about an hour or so before my alarm goes off times.  Wakes up due to Uncertain  She has trouble falling back asleep Not really any, I just roll over times a week.   It usually takes Not long to get back to sleep  Patient is usually up at 5:00 am  Uses alarm: Yes    Weekends/Non-work Days/All Other Days:  Usually gets into bed at Midnight   Takes patient about 10 mins to fall asleep  Patient is usually up at 8:30 to 9:00 am  Uses alarm: No    Sleep Need  Patient gets  7 hours a night sleep on average   Patient thinks she needs about I would like 8 but that is my fault for not going to bed earlier sleep    Danita ENCINAS Zambrano prefers to sleep in this position(s): Back;Head Elevated   Patient states they do the following activities in bed:      Naps  Patient takes a purposeful nap Never times a week and naps are usually If i take one about an hour in duration  She feels better after a nap: No  She dozes off unintentionally Once per week days per week  Patient has had a driving accident or near-miss due to sleepiness/drowsiness: No      SLEEP DISRUPTIONS:    Breathing/Snoring  Patient snores:No  Other people complain about her snoring: No  Patient has been told she stops breathing in her sleep:No  She has issues with the following:      Movement:  Patient gets pain, discomfort, with an urge to move:  Yes - r/t back issues  It happens when she is resting:  Yes  It happens more at night:  No  Patient has been told she kicks her legs at night:  No     Behaviors in Sleep:  Danita Zambrano has experienced the following behaviors  while sleeping: Sleep-talking  She has experienced sudden muscle weakness during the day: No      Is there anything else you would like your sleep provider to know:        CAFFEINE AND OTHER SUBSTANCES:    Patient consumes caffeinated beverages per day:  Two cups of coffee  Last caffeine use is usually: Morning before 8:30 am  List of any prescribed or over the counter stimulants that patient takes:    List of any prescribed or over the counter sleep medication patient takes: Tylenol PM  List of previous sleep medications that patient has tried:    Patient drinks alcohol to help them sleep: No  Patient drinks alcohol near bedtime: No    Family History:  Patient has a family member been diagnosed with a sleep disorder: Yes  Father had CPAP machine         SCALES:    EPWORTH SLEEPINESS SCALE         7/13/2023    11:06 AM    Turner Sleepiness Scale ( EJ Johnson  1990-1997[facility] [place]/English - Final version - 21 Nov 07 - [facility] [place].)   Sitting and reading Slight chance of dozing   Watching TV Moderate chance of dozing   Sitting, inactive in a public place (e.g. a theatre or a meeting) Would never doze   As a passenger in a car for an hour without a break Would never doze   Lying down to rest in the afternoon when circumstances permit Slight chance of dozing   Sitting and talking to someone Would never doze   Sitting quietly after a lunch without alcohol Slight chance of dozing   In a car, while stopped for a few minutes in traffic Would never doze   Turner Score (MC) 5   Turner Score (Sleep) 5         INSOMNIA SEVERITY INDEX (TERRI)          7/13/2023    10:42 AM   Insomnia Severity Index (TERRI)   Difficulty falling asleep 0   Difficulty staying asleep 1   Problems waking up too early 1   How SATISFIED/DISSATISFIED are you with your CURRENT sleep pattern? 1   How NOTICEABLE to others do you think your sleep problem is in terms of impairing the quality of your life? 0   How WORRIED/DISTRESSED are you about  "your current sleep problem? 0   To what extent do you consider your sleep problem to INTERFERE with your daily functioning (e.g. daytime fatigue, mood, ability to function at work/daily chores, concentration, memory, mood, etc.) CURRENTLY? 1   TERRI Total Score 4       Guidelines for Scoring/Interpretation:  Total score categories:  0-7 = No clinically significant insomnia   8-14 = Subthreshold insomnia   15-21 = Clinical insomnia (moderate severity)  22-28 = Clinical insomnia (severe)  Used via courtesy of www.[employer].va.gov with permission from Juan Pablo Pires PhD., [facility]      STOP BANG score: 3        7/14/2023     8:09 AM   STOP BANG Questionnaire (  2008, the American Society of Anesthesiologists, Inc. [employer].)   B/P Clinic: 119/70   BMI Clinic: 40.34         GAD7         No data to display                  CAGE-AID         No data to display                CAGE-AID reprinted with permission from the [place] Medical Journal, MONTSERRAT Coleman and CARMELO Hutchins, \"Conjoint screening questionnaires for alcohol and drug abuse\" [place] Medical Journal 94: 135-140, 1995.      PATIENT HEALTH QUESTIONNAIRE-9 (PHQ - 9)         No data to display                Developed by Jose Baltazar, Naya Cotton, Dave Leiva and colleagues, with an educational ari from [employer] No permission required to reproduce, translate, display or distribute.        Allergies:    No Known Allergies    Medications:    Current Outpatient Medications   Medication Sig Dispense Refill     albuterol (PROAIR HFA/PROVENTIL HFA/VENTOLIN HFA) 108 (90 Base) MCG/ACT inhaler Inhale 2 puffs into the lungs every 4 hours as needed for shortness of breath or wheezing       amitriptyline (ELAVIL) 10 MG tablet Take 10 mg by mouth At Bedtime       aspirin 81 MG EC tablet Take 81 mg by mouth daily       budesonide-formoterol (SYMBICORT) 160-4.5 MCG/ACT Inhaler Inhale 2 puffs into the lungs 2 times daily 6 g " 11     cetirizine (ZYRTEC) 10 MG tablet Take 10 mg by mouth daily       ibuprofen (ADVIL/MOTRIN) 200 MG tablet Take 600 mg by mouth once as needed for pain       ipratropium - albuterol 0.5 mg/2.5 mg/3 mL (DUONEB) 0.5-2.5 (3) MG/3ML neb solution Take 1 vial (3 mLs) by nebulization every 4 hours as needed for shortness of breath, wheezing or cough 90 mL 3     lisinopril (ZESTRIL) 10 MG tablet Take 10 mg by mouth daily       simvastatin (ZOCOR) 20 MG tablet Take 20 mg by mouth At Bedtime       varenicline (CHANTIX) 1 MG tablet Take 1 mg by mouth daily       venlafaxine (EFFEXOR-ER) 225 MG 24 hr tablet Take 225 mg by mouth daily       vitamin D3 (CHOLECALCIFEROL) 250 mcg (09507 units) capsule Take 1 capsule by mouth daily       zolpidem (AMBIEN) 5 MG tablet Take tablet by mouth 15 minutes prior to sleep, for Sleep Study 1 tablet 0     hypromellose (ARTIFICIAL TEARS) 0.5 % SOLN ophthalmic solution Place 1 drop into both eyes daily (Patient not taking: Reported on 7/14/2023)       tetrahydrozoline (VISINE) 0.05 % ophthalmic solution Place 1 drop into both eyes once as needed (Patient not taking: Reported on 7/14/2023)         Problem List:  Patient Active Problem List    Diagnosis Date Noted     Chronic obstructive pulmonary disease, unspecified COPD type (H) 04/13/2023     Priority: Medium     Migraine without aura and without status migrainosus, not intractable 04/13/2023     Priority: Medium     COPD exacerbation (H) 02/23/2023     Priority: Medium     HTN (hypertension) 02/12/2019     Priority: Medium     HLD (hyperlipidemia) 02/12/2019     Priority: Medium     Actinic skin damage 02/22/2018     Priority: Medium     Diverticulosis of sigmoid colon 04/07/2017     Priority: Medium     Formatting of this note might be different from the original.  per Gastro report (recommend f/u colonoscopy in 10 years)       Third nerve palsy 09/30/2011     Priority: Medium     Depression 09/30/2011     Priority: Medium     Vitamin D  deficiency 2010     Priority: Medium     Formatting of this note might be different from the original.  LW Onset:    Formatting of this note might be different from the original.  LW Onset:            Past Medical/Surgical History:  Past Medical History:   Diagnosis Date     COPD (chronic obstructive pulmonary disease) (H)      No past surgical history on file.    Social History:  Social History     Socioeconomic History     Marital status: Single     Spouse name: Not on file     Number of children: Not on file     Years of education: Not on file     Highest education level: Not on file   Occupational History     Not on file   Tobacco Use     Smoking status: Former     Packs/day: 1.00     Years: 39.00     Pack years: 39.00     Types: Cigarettes     Quit date: 2023     Years since quittin.3     Passive exposure: Never     Smokeless tobacco: Never     Tobacco comments:     2023 counseling done, using patch while admitted, Chantix at home, took workbook   Vaping Use     Vaping Use: Never used   Substance and Sexual Activity     Alcohol use: Not on file     Drug use: Not on file     Sexual activity: Not on file   Other Topics Concern     Not on file   Social History Narrative     Not on file     Social Determinants of Health     Financial Resource Strain: Not on file   Food Insecurity: Not on file   Transportation Needs: Not on file   Physical Activity: Not on file   Stress: Not on file   Social Connections: Not on file   Intimate Partner Violence: Not on file   Housing Stability: Not on file       Family History:  No family history on file.    Review of Systems:  A complete review of systems reviewed by me is negative with the exeption of what has been mentioned in the history of present illness.  In the last TWO WEEKS have you experienced any of the following symptoms?  Fevers: No  Night Sweats: No  Weight Gain: No  Pain at Night: Yes  Double Vision: No  Changes in Vision: No  Difficulty  "Breathing through Nose: No  Sore Throat in Morning: No  Dry Mouth in the Morning: No  Shortness of Breath Lying Flat: No  Shortness of Breath With Activity: Yes  Awakening with Shortness of Breath: No  Increased Cough: No  Heart Racing at Night: No  Swelling in Feet or Legs: No  Diarrhea at Night: No  Heartburn at Night: No  Urinating More than Once at Night: No  Losing Control of Urine at Night: No  Joint Pains at Night: Yes  Headaches in Morning: No  Weakness in Arms or Legs: No  Depressed Mood: No  Anxiety: No     Physical Examination:  Vitals: /70   Ht 1.626 m (5' 4\")   Wt 106.6 kg (235 lb)   LMP  (LMP Unknown)   BMI 40.34 kg/m    BMI= Body mass index is 40.34 kg/m .           GENERAL APPEARANCE: healthy, alert, no distress and cooperative  EYES: Eyes grossly normal to inspection  RESP: Unlabored, easy breathing with normal conversational speech  CV: color normal  NEURO: Alert and oriented x3, normal strength and tone, mentation intact and speech normal  PSYCH: mentation appears normal and affect normal/bright  Mallampati Class: Not examined  Tonsillar Stage: Not examined         Data: All pertinent previous laboratory data reviewed     Recent Labs   Lab Test 03/01/23  0607 02/28/23  0603    139   POTASSIUM 4.0 4.2   CHLORIDE 98 98   CO2 30* 30*   ANIONGAP 11 11   GLC 85 86   BUN 10.7 11.3   CR 0.63 0.61   WICHO 9.3 9.3       Recent Labs   Lab Test 03/01/23  0607   WBC 6.7   RBC 4.67   HGB 12.7   HCT 42.8   MCV 92   MCH 27.2   MCHC 29.7*   RDW 13.8          Recent Labs   Lab Test 02/23/23  0603   PROTTOTAL 7.8   ALBUMIN 4.4   BILITOTAL 0.3   ALKPHOS 91   AST 22   ALT 14       No results found for: TSH    No results found for: UAMP, UBARB, BENZODIAZEUR, UCANN, UCOC, OPIT, UPCP    No results found for: IRONSAT, [id number], AMAURY    pH Venous POCT (no units)   Date Value   02/23/2023 7.14 (LL)       @LABRCNTIPR(phv:4,pco2v:4,po2v:4,hco3v:4,mirtha:4,o2per:4)@    Echocardiology: No results found for this " or any previous visit (from the past 4320 hour(s)).    Chest x-ray: No results found for this or any previous visit from the past 365 days.      Chest CT: CT Chest w/o Contrast 03/29/2023    Narrative  CT chest without contrast    INDICATION: COPD exacerbation    COMPARISON: CT pulmonary angiogram 2/23/2023    FINDINGS: No contrast. Included thyroid appears unremarkable. No  enlarged lymph nodes in the chest. Nonenlarged aortopulmonary window  and right lower paratracheal lymph nodes incidentally noted. There is  coronary artery calcification present. Aorta and pulmonary artery are  both normal in size. Heart size also normal. No pleural or pericardial  effusion. Right adrenal mild hyperplasia unchanged with apparent  nodule at the anterior aspect of the left adrenal gland with  Hounsfield units approximately plus 8, likely consistent with adenoma.  This is not obviously significantly changed dating back to CT of  4/4/2002. Splenic artery atherosclerotic calcification.  Bone detail shows degenerative changes in the thoracic spine.. No  suspicious sclerotic or lytic/destructive bone lesion.  Detail of the lungs shows 2 mm lateral right upper lobe nodule (series  4 image 89). 2 mm anterior inferior right upper lobe nodule (series 4  image 134). Atelectatic opacities in the lung bases are noted. No  dominant left-sided nodule otherwise.  The patchy opacities in the right middle lobe and lingula have  improved. The consolidative atelectatic opacities in the lower lobes  are increased.    Impression  IMPRESSION: Mixed increased and decreased consolidative/atelectatic  opacities, increased in the lower lobes and decreased in the right  middle lobe and lingula. Follow-up infrasix weeks recommended to try  and document clearing. Atherosclerosis. Prominent left adrenal gland  although quite hypodense. Likely adenoma. This can be followed at time  of follow-up CT.    CALEB TIDWELL MD      SYSTEM ID:  [id number]      PFT:  Most Recent [facility] Pulmonary Function Testing    FVC-Pred   Date Value Ref Range Status   03/15/2023 2.97 L      FVC-Pre   Date Value Ref Range Status   03/15/2023 1.98 L      FVC-%Pred-Pre   Date Value Ref Range Status   03/15/2023 66 %      FEV1-Pre   Date Value Ref Range Status   03/15/2023 1.24 L      FEV1-%Pred-Pre   Date Value Ref Range Status   03/15/2023 52 %      FEV1FVC-Pred   Date Value Ref Range Status   03/15/2023 80 %      FEV1FVC-Pre   Date Value Ref Range Status   03/15/2023 62 %      No results found for: 20029  FEFMax-Pred   Date Value Ref Range Status   03/15/2023 6.40 L/sec      FEFMax-Pre   Date Value Ref Range Status   03/15/2023 4.10 L/sec      FEFMax-%Pred-Pre   Date Value Ref Range Status   03/15/2023 64 %      ExpTime-Pre   Date Value Ref Range Status   03/15/2023 7.04 sec      FIFMax-Pre   Date Value Ref Range Status   03/15/2023 3.45 L/sec      FEV1FEV6-Pred   Date Value Ref Range Status   03/15/2023 81 %      FEV1FEV6-Pre   Date Value Ref Range Status   03/15/2023 63 %      The FVC, FEV1 and FEV1/FVC ratio are reduced.   The inspiratory flow rates are within normal limits.  Following administration of bronchodilators, there is no significant response.  The diffusing capacity is reduced.  However, the diffusing capacity was not corrected for the patient's hemoglobin.     IMPRESSION:     Moderate airflow obstruction. No significant change following bronchodilators, but this does not rule out clinical benefit.     Mild diffusion defect.  The diffusing capacity was not corrected for the patient's hemoglobin.     6 Minute Walk Test:    IMPRESSION:     Resting hypoxemia.     Six minute walk distance is reduced indicating a decrease in exercise tolerance.     There is significant oxygen desaturation, without hypoxemia, during the six-minute walk done on supplemental oxygen at 4 L/m.     AMADOR Sanchez CNP 7/14/2023   Sleep Medicine      This note was written with the assistance of the  Dragon voice-dictation technology software. The final document, although reviewed, may contain errors. For corrections, please contact the office.

## 2023-08-30 ENCOUNTER — TELEPHONE (OUTPATIENT)
Dept: PULMONOLOGY | Facility: CLINIC | Age: 58
End: 2023-08-30
Payer: COMMERCIAL

## 2023-08-30 NOTE — TELEPHONE ENCOUNTER
[facility] Call Center    Phone Message    May a detailed message be left on voicemail: yes     Reason for Call: Other: .     Per Patient states she sent a [employer] message with [employer] paper work to be filled out and sent back to her company. Patient states she was told to contact the clinic, writer seen the [employer] message that patient was referencing. Writer is unsure what the clinic is wanting, writer tried to call the backline, nurse line was not available. Patient is wanting to get a call back. Please advise.     Action Taken: Message routed to:  [facility] ([facility] Lung    Travel Screening: Not Applicable

## 2023-09-06 NOTE — TELEPHONE ENCOUNTER
Called pt to let her know that [facility] paperwork has been given to Dr. Del Angel and Dr. Mao to fill out.  Informed pt that our clinic will let her know if there are any updates. Pt was agreeable and thankful that it was being worked on.     Amy Benton RN on 9/6/2023 at 11:10 AM

## 2023-09-06 NOTE — TELEPHONE ENCOUNTER
Patient calling in for update.  Please return call.  Patient is stating that work is requiring that this be taken care of asap.

## 2023-10-04 ENCOUNTER — TELEPHONE (OUTPATIENT)
Dept: PULMONOLOGY | Facility: CLINIC | Age: 58
End: 2023-10-04
Payer: COMMERCIAL

## 2023-10-04 NOTE — TELEPHONE ENCOUNTER
Faxed [facility] paperwork to Yulissa Sauceda at [facility] [phone].

## 2023-11-27 ENCOUNTER — TELEPHONE (OUTPATIENT)
Dept: PULMONOLOGY | Facility: CLINIC | Age: 58
End: 2023-11-27

## 2023-11-27 ENCOUNTER — TELEPHONE (OUTPATIENT)
Dept: PULMONOLOGY | Facility: CLINIC | Age: 58
End: 2023-11-27
Payer: COMMERCIAL

## 2023-11-27 DIAGNOSIS — J44.1 COPD EXACERBATION (H): Primary | ICD-10-CM

## 2023-11-27 NOTE — TELEPHONE ENCOUNTER
Called patient to let her know she is scheduled for a 2:30pm CXR with 3:00pm Dr Coelho appt. She is aware of appt location and time.

## 2023-11-27 NOTE — TELEPHONE ENCOUNTER
Patient will be rescheduled for tomorrow at 1500 with Dr. Coelho. CXR will be scheduled prior. Information forwarded to Regina our Navigator to take care of scheduling both MD appointment and CXR.      Adela Nguyen RN

## 2023-11-27 NOTE — TELEPHONE ENCOUNTER
Pt cancelled her apt today with Dr. Hernandez, having car trouble unable to make apt. Pt would like to reschedule, please follow up with pt.

## 2023-11-27 NOTE — TELEPHONE ENCOUNTER
Spoke with Danita. She has been sick for 2-3 weeks. Has had persistent coughing. Negative for COVID. Has not been tested for RSV or Flu. Her oxygen level has been 95% when sitting, but 89-90% when up walking around. She is currently wearing her oxygen at 3LPM NC due to her increased SOB. She usually doesn't have to wear her oxygen inside the house. She previously only used it when she was active outside. She hasn't been around anyone who is sick that she knows of. She has been using Nyquil at night to help her sleep. She is also having to sleep with her HOB up.     She stated she has stomach and chest pain from increased coughing. She is audibly SOB while talking with me, fever on and off with the highest being 101.0. Denies chills and wheezing Productive sputum which is white in color.    Danita has agreed to come in to be seen today.      Appointment scheduled for CXR and around noon and Dr. Hernandez at 1300.    Adela Nguyen RN

## 2023-11-28 ENCOUNTER — OFFICE VISIT (OUTPATIENT)
Dept: PULMONOLOGY | Facility: CLINIC | Age: 58
End: 2023-11-28
Attending: STUDENT IN AN ORGANIZED HEALTH CARE EDUCATION/TRAINING PROGRAM
Payer: COMMERCIAL

## 2023-11-28 ENCOUNTER — ANCILLARY PROCEDURE (OUTPATIENT)
Dept: GENERAL RADIOLOGY | Facility: CLINIC | Age: 58
End: 2023-11-28
Payer: COMMERCIAL

## 2023-11-28 VITALS — OXYGEN SATURATION: 91 % | DIASTOLIC BLOOD PRESSURE: 66 MMHG | SYSTOLIC BLOOD PRESSURE: 92 MMHG | HEART RATE: 107 BPM

## 2023-11-28 DIAGNOSIS — J44.1 COPD EXACERBATION (H): ICD-10-CM

## 2023-11-28 DIAGNOSIS — J44.9 CHRONIC OBSTRUCTIVE PULMONARY DISEASE, UNSPECIFIED COPD TYPE (H): Primary | ICD-10-CM

## 2023-11-28 PROCEDURE — 99213 OFFICE O/P EST LOW 20 MIN: CPT | Performed by: STUDENT IN AN ORGANIZED HEALTH CARE EDUCATION/TRAINING PROGRAM

## 2023-11-28 PROCEDURE — 99214 OFFICE O/P EST MOD 30 MIN: CPT | Performed by: STUDENT IN AN ORGANIZED HEALTH CARE EDUCATION/TRAINING PROGRAM

## 2023-11-28 PROCEDURE — 71046 X-RAY EXAM CHEST 2 VIEWS: CPT | Performed by: RADIOLOGY

## 2023-11-28 RX ORDER — BUDESONIDE, GLYCOPYRROLATE, AND FORMOTEROL FUMARATE 160; 9; 4.8 UG/1; UG/1; UG/1
2 AEROSOL, METERED RESPIRATORY (INHALATION) 2 TIMES DAILY
Qty: 10.7 G | Refills: 3 | Status: SHIPPED | OUTPATIENT
Start: 2023-11-28 | End: 2024-05-20

## 2023-11-28 RX ORDER — PREDNISONE 20 MG/1
40 TABLET ORAL DAILY
Qty: 7 TABLET | Refills: 0 | Status: SHIPPED | OUTPATIENT
Start: 2023-11-28

## 2023-11-28 ASSESSMENT — PAIN SCALES - GENERAL: PAINLEVEL: NO PAIN (0)

## 2023-11-28 NOTE — LETTER
11/28/2023         RE: Danita Zambrano  [address] [place] [address] [address]  [place] [id number]        Dear Colleague,    Thank you for referring your patient, Danita Zambrano, to the [facility] [place]. Please see a copy of my visit note below.    Pulmonary Clinic Note    Date of Service: 11/28/2023     Chief Complaint   Patient presents with    Follow Up     Return appt        A/P:  58F being seen for follow up COPD and exertional hypoxemia. Recent exacerbation 2/2 URI. Has had worsening dyspnea since then.     - stop Symbicort  - start CBR-OQWM-EHXL (Breztri) twice daily, rinse mouth out after use  - continue prn albuterol  - encouraged regular exercise  - OK to substitute medications based on formulary     History:  58F being seen for follow up COPD and exertional hypoxemia. Last seen by Jose Rios and Daylin 6/2023. She is prescribed ICS-LABA (Symbicort), prn duonebs, and prn albuterol. Has never required nebulizer. She is using 3L O2 w/ activity. Has required to use O2 more at home w/ minimal activity which prompted her return visit. MARI w/ minimal activity. No SOB at rest. Checks SpO2 at home typically high-90s, has seen as low as 85% w/o O2 w/ activity. No chest pain or tightness. No wheezing. Frequent cough, occasionally productive of yellow sputum. No nocturnal cough. Sleeps in recliner. No PND. No LE edema. Does not use albuterol because she doesn't feel it helps. Using Symbicort. She did attend pulmonary rehab, but did not find it overly helpful.     Smoking: quit 3/2023, ~50 pack year history   Bird exposure: no             Animal exposure: no        Inhalation exposure: no                            10 point review of systems negative, aside from that mentioned in HPI.    BP 92/66   Pulse 107   LMP  (LMP Unknown)   SpO2 91%   Gen: well-appearing  HEENT: Mallampati IV  Card: RRR  Pulm: diffusely diminished   Abd: soft  MSK: no edema, no acute joint  abnormality   Skin: no obvious rash  Psych: normal affect  Neuro: alert and oriented     Labs:  Personally reviewed    Imaging/Studies: Personally reviewed  CXR (2023) - no acute abnormality, hyperinflation   6MWT (3/2023) - resting hypoxemia, reduced distance, significant desaturation w/o hypoxemia on 4L  PFTs (3/2023) - moderate obstruction, no significant BD response, mild diffusion defect   CT Chest (3/2023) - increased opacities lower lobes, decreased opacities RML and lingula    Past Medical History:   Diagnosis Date    COPD (chronic obstructive pulmonary disease) (H)      PSH: reviewed and non-contributory  FHx: reviewed and non-contributory   Social History     Socioeconomic History    Marital status: Single     Spouse name: Not on file    Number of children: Not on file    Years of education: Not on file    Highest education level: Not on file   Occupational History    Not on file   Tobacco Use    Smoking status: Former     Packs/day: 1.00     Years: 39.00     Additional pack years: 0.00     Total pack years: 39.00     Types: Cigarettes     Quit date: 2023     Years since quittin.7     Passive exposure: Never    Smokeless tobacco: Never    Tobacco comments:     2023 counseling done, using patch while admitted, Chantix at home, took workbook   Vaping Use    Vaping Use: Never used   Substance and Sexual Activity    Alcohol use: Not on file    Drug use: Not on file    Sexual activity: Not on file   Other Topics Concern    Not on file   Social History Narrative    Not on file     Social Determinants of Health     Financial Resource Strain: Not on file   Food Insecurity: Not on file   Transportation Needs: Not on file   Physical Activity: Not on file   Stress: Not on file   Social Connections: Not on file   Interpersonal Safety: Not on file   Housing Stability: Not on file       35 minutes spent reviewing chart, reviewing test results, talking with and examining patient, formulating plan, and  documentation on the day of the encounter.    Abelardo Coelho MD  Pulmonary and Critical Care Medicine  [facility]       Again, thank you for allowing me to participate in the care of your patient.        Sincerely,        Abelardo Coelho MD

## 2023-11-28 NOTE — NURSING NOTE
Chief Complaint   Patient presents with    Follow Up     Return appt      Vitals were taken and medications were reconciled.     Celina Figueroa RMA  2:37 PM

## 2023-11-28 NOTE — PATIENT INSTRUCTIONS
Stop Symbicort. Start Breztri twice daily, rinse your mouth out after use. Continue as needed albuterol or prior to activity. Continue oxygen with activity. I would like to get an ultrasound of your heart.

## 2023-11-28 NOTE — PROGRESS NOTES
Pulmonary Clinic Note    Date of Service: 11/28/2023     Chief Complaint   Patient presents with    Follow Up     Return appt        A/P:  58F being seen for follow up COPD and exertional hypoxemia. Recent exacerbation 2/2 URI. Has had worsening dyspnea since then.     - stop Symbicort  - start DSI-USQJ-LRBS (Breztri) twice daily, rinse mouth out after use  - continue prn albuterol  - encouraged regular exercise  - TTE  - OK to substitute medications based on formulary     History:  58F being seen for follow up COPD and exertional hypoxemia. Last seen by Jose Rios and Daylin 6/2023. She is prescribed ICS-LABA (Symbicort), prn duonebs, and prn albuterol. Has never required nebulizer. She is using 3L O2 w/ activity. Has required to use O2 more at home w/ minimal activity which prompted her return visit. MARI w/ minimal activity. No SOB at rest. Checks SpO2 at home typically high-90s, has seen as low as 85% w/o O2 w/ activity. No chest pain or tightness. No wheezing. Frequent cough, occasionally productive of yellow sputum. No nocturnal cough. Sleeps in recliner. No PND. No LE edema. Does not use albuterol because she doesn't feel it helps. Using Symbicort. She did attend pulmonary rehab, but did not find it overly helpful.     Smoking: quit 3/2023, ~50 pack year history   Bird exposure: no             Animal exposure: no        Inhalation exposure: no                            10 point review of systems negative, aside from that mentioned in HPI.    BP 92/66   Pulse 107   LMP  (LMP Unknown)   SpO2 91%   Gen: well-appearing  HEENT: Mallampati IV  Card: RRR  Pulm: diffusely diminished   Abd: soft  MSK: no edema, no acute joint abnormality   Skin: no obvious rash  Psych: normal affect  Neuro: alert and oriented     Labs:  Personally reviewed    Imaging/Studies: Personally reviewed  CXR (11/2023) - no acute abnormality, hyperinflation   6MWT (3/2023) - resting hypoxemia, reduced distance, significant desaturation w/o  hypoxemia on 4L  PFTs (3/2023) - moderate obstruction, no significant BD response, mild diffusion defect   CT Chest (3/2023) - increased opacities lower lobes, decreased opacities RML and lingula    Past Medical History:   Diagnosis Date    COPD (chronic obstructive pulmonary disease) (H)      PSH: reviewed and non-contributory  FHx: reviewed and non-contributory   Social History     Socioeconomic History    Marital status: Single     Spouse name: Not on file    Number of children: Not on file    Years of education: Not on file    Highest education level: Not on file   Occupational History    Not on file   Tobacco Use    Smoking status: Former     Packs/day: 1.00     Years: 39.00     Additional pack years: 0.00     Total pack years: 39.00     Types: Cigarettes     Quit date: 2023     Years since quittin.7     Passive exposure: Never    Smokeless tobacco: Never    Tobacco comments:     2023 counseling done, using patch while admitted, Chantix at home, took workbook   Vaping Use    Vaping Use: Never used   Substance and Sexual Activity    Alcohol use: Not on file    Drug use: Not on file    Sexual activity: Not on file   Other Topics Concern    Not on file   Social History Narrative    Not on file     Social Determinants of Health     Financial Resource Strain: Not on file   Food Insecurity: Not on file   Transportation Needs: Not on file   Physical Activity: Not on file   Stress: Not on file   Social Connections: Not on file   Interpersonal Safety: Not on file   Housing Stability: Not on file       35 minutes spent reviewing chart, reviewing test results, talking with and examining patient, formulating plan, and documentation on the day of the encounter.    Abelardo Coelho MD  Pulmonary and Critical Care Medicine  [facility]

## 2023-12-19 ENCOUNTER — ANCILLARY PROCEDURE (OUTPATIENT)
Dept: CT IMAGING | Facility: CLINIC | Age: 58
End: 2023-12-19
Attending: INTERNAL MEDICINE
Payer: COMMERCIAL

## 2023-12-19 ENCOUNTER — OFFICE VISIT (OUTPATIENT)
Dept: PULMONOLOGY | Facility: CLINIC | Age: 58
End: 2023-12-19
Attending: INTERNAL MEDICINE
Payer: COMMERCIAL

## 2023-12-19 VITALS — DIASTOLIC BLOOD PRESSURE: 84 MMHG | HEART RATE: 106 BPM | OXYGEN SATURATION: 92 % | SYSTOLIC BLOOD PRESSURE: 128 MMHG

## 2023-12-19 DIAGNOSIS — J44.9 CHRONIC OBSTRUCTIVE PULMONARY DISEASE, UNSPECIFIED COPD TYPE (H): Primary | ICD-10-CM

## 2023-12-19 DIAGNOSIS — J44.9 CHRONIC OBSTRUCTIVE PULMONARY DISEASE, UNSPECIFIED COPD TYPE (H): ICD-10-CM

## 2023-12-19 PROCEDURE — 99214 OFFICE O/P EST MOD 30 MIN: CPT | Performed by: INTERNAL MEDICINE

## 2023-12-19 PROCEDURE — 99213 OFFICE O/P EST LOW 20 MIN: CPT | Performed by: INTERNAL MEDICINE

## 2023-12-19 PROCEDURE — 71250 CT THORAX DX C-: CPT | Performed by: RADIOLOGY

## 2023-12-19 NOTE — PROGRESS NOTES
Pulmonary Clinic Note    Date of Service: 12/19/2023    Chief Complaint   Patient presents with    Follow Up     6mo f/u       A/P:  58F being seen for follow up COPD and exertional hypoxemia. Recent exacerbation 2/2 URI. Has had worsening dyspnea since then.     - continue TZT-FTKP-GIVH (Breztri) twice daily, rinse mouth out after use  - continue prn albuterol  - encouraged regular exercise  - TTE tomorrow  - CT today looks okay to me, eligible for annual lung cancer screening   - OK to substitute medications based on formulary     History:  58F being seen for follow up COPD and exertional hypoxemia. Last seen by Dr Coelho in November 2023. She was getting over a viral infection at that time, she improved with the prednisone he prescribed. She is now taking Breztri and prn albuterol. Has never required nebulizer. She is using 3L O2 w/ activity. She had increase in dyspnea and oxygen needs so getting testing ordered by Dr Coelho, CT today, Echo tomorrow. MARI w/ minimal activity. No SOB at rest. Checks SpO2 at home typically high-90s, has seen as low as 85% w/o O2 w/ activity. No chest pain or tightness. No wheezing. Frequent cough, occasionally productive of yellow sputum. No nocturnal cough. Sleeps in recliner. No PND. No LE edema. Does not use albuterol because she doesn't feel it helps. She did attend pulmonary rehab, but did not find it overly helpful.     Smoking: quit 3/2023, ~50 pack year history   Bird exposure: no             Animal exposure: no        Inhalation exposure: no                            10 point review of systems negative, aside from that mentioned in HPI.    /84   Pulse 106   LMP  (LMP Unknown)   SpO2 92%   Gen: well-appearing  HEENT: Mallampati IV  Card: RRR  Pulm: diffusely diminished   Abd: soft  MSK: no edema, no acute joint abnormality   Skin: no obvious rash  Psych: normal affect  Neuro: alert and oriented     Labs:  Personally reviewed    Imaging/Studies: Personally  reviewed  CXR (2023) - no acute abnormality, hyperinflation   6MWT (3/2023) - resting hypoxemia, reduced distance, significant desaturation w/o hypoxemia on 4L  PFTs (3/2023) - moderate obstruction, no significant BD response, mild diffusion defect   CT Chest (3/2023) - increased opacities lower lobes, decreased opacities RML and lingula; today reviewed and the basilar opacities improved    Past Medical History:   Diagnosis Date    COPD (chronic obstructive pulmonary disease) (H)      PSH: reviewed and non-contributory  FHx: reviewed and non-contributory   Social History     Socioeconomic History    Marital status: Single     Spouse name: Not on file    Number of children: Not on file    Years of education: Not on file    Highest education level: Not on file   Occupational History    Not on file   Tobacco Use    Smoking status: Former     Packs/day: 1.00     Years: 39.00     Additional pack years: 0.00     Total pack years: 39.00     Types: Cigarettes     Quit date: 2023     Years since quittin.8     Passive exposure: Never    Smokeless tobacco: Never    Tobacco comments:     2023 counseling done, using patch while admitted, Chantix at home, took workbook   Vaping Use    Vaping Use: Never used   Substance and Sexual Activity    Alcohol use: Not on file    Drug use: Not on file    Sexual activity: Not on file   Other Topics Concern    Not on file   Social History Narrative    Not on file     Social Determinants of Health     Financial Resource Strain: Not on file   Food Insecurity: Not on file   Transportation Needs: Not on file   Physical Activity: Not on file   Stress: Not on file   Social Connections: Not on file   Interpersonal Safety: Not on file   Housing Stability: Not on file

## 2023-12-19 NOTE — LETTER
12/19/2023         RE: Danita Zambrano  [address] [place] [address] [address]  [facility] [address]        Dear Colleague,    Thank you for referring your patient, Danita Zambrano, to the [facility] L[facility]G SCIENCE AND HEALTH CLINIC [place]. Please see a copy of my visit note below.    Pulmonary Clinic Note    Date of Service: 12/19/2023    Chief Complaint   Patient presents with     Follow Up     6mo f/u       A/P:  58F being seen for follow up COPD and exertional hypoxemia. Recent exacerbation 2/2 URI. Has had worsening dyspnea since then.     - continue MFP-SNQX-WZIZ (Breztri) twice daily, rinse mouth out after use  - continue prn albuterol  - encouraged regular exercise  - TTE tomorrow  - CT today looks okay to me, eligible for annual lung cancer screening   - OK to substitute medications based on formulary     History:  58F being seen for follow up COPD and exertional hypoxemia. Last seen by Dr Coelho in November 2023. She was getting over a viral infection at that time, she improved with the prednisone he prescribed. She is now taking Breztri and prn albuterol. Has never required nebulizer. She is using 3L O2 w/ activity. She had increase in dyspnea and oxygen needs so getting testing ordered by Dr Coelho, CT today, Echo tomorrow. MARI w/ minimal activity. No SOB at rest. Checks SpO2 at home typically high-90s, has seen as low as 85% w/o O2 w/ activity. No chest pain or tightness. No wheezing. Frequent cough, occasionally productive of yellow sputum. No nocturnal cough. Sleeps in recliner. No PND. No LE edema. Does not use albuterol because she doesn't feel it helps. She did attend pulmonary rehab, but did not find it overly helpful.     Smoking: quit 3/2023, ~50 pack year history   Bird exposure: no             Animal exposure: no        Inhalation exposure: no                            10 point review of systems negative, aside from that mentioned in HPI.    /84   Pulse 106   LMP  (LMP  Unknown)   SpO2 92%   Gen: well-appearing  HEENT: Mallampati IV  Card: RRR  Pulm: diffusely diminished   Abd: soft  MSK: no edema, no acute joint abnormality   Skin: no obvious rash  Psych: normal affect  Neuro: alert and oriented     Labs:  Personally reviewed    Imaging/Studies: Personally reviewed  CXR (2023) - no acute abnormality, hyperinflation   6MWT (3/2023) - resting hypoxemia, reduced distance, significant desaturation w/o hypoxemia on 4L  PFTs (3/2023) - moderate obstruction, no significant BD response, mild diffusion defect   CT Chest (3/2023) - increased opacities lower lobes, decreased opacities RML and lingula; today reviewed and the basilar opacities improved    Past Medical History:   Diagnosis Date     COPD (chronic obstructive pulmonary disease) (H)      PSH: reviewed and non-contributory  FHx: reviewed and non-contributory   Social History     Socioeconomic History     Marital status: Single     Spouse name: Not on file     Number of children: Not on file     Years of education: Not on file     Highest education level: Not on file   Occupational History     Not on file   Tobacco Use     Smoking status: Former     Packs/day: 1.00     Years: 39.00     Additional pack years: 0.00     Total pack years: 39.00     Types: Cigarettes     Quit date: 2023     Years since quittin.8     Passive exposure: Never     Smokeless tobacco: Never     Tobacco comments:     2023 counseling done, using patch while admitted, Chantix at home, took workbook   Vaping Use     Vaping Use: Never used   Substance and Sexual Activity     Alcohol use: Not on file     Drug use: Not on file     Sexual activity: Not on file   Other Topics Concern     Not on file   Social History Narrative     Not on file     Social Determinants of Health     Financial Resource Strain: Not on file   Food Insecurity: Not on file   Transportation Needs: Not on file   Physical Activity: Not on file   Stress: Not on file   Social  Connections: Not on file   Interpersonal Safety: Not on file   Housing Stability: Not on file         Again, thank you for allowing me to participate in the care of your patient.        Sincerely,        Little Del Angel MD

## 2023-12-19 NOTE — NURSING NOTE
Chief Complaint   Patient presents with    Follow Up     6mo f/u     Vitals were taken and medications were reconciled.     LUANNE Reyes

## 2024-01-29 ENCOUNTER — ANCILLARY PROCEDURE (OUTPATIENT)
Dept: CARDIOLOGY | Facility: CLINIC | Age: 59
End: 2024-01-29
Attending: STUDENT IN AN ORGANIZED HEALTH CARE EDUCATION/TRAINING PROGRAM
Payer: COMMERCIAL

## 2024-01-29 DIAGNOSIS — J44.9 CHRONIC OBSTRUCTIVE PULMONARY DISEASE, UNSPECIFIED COPD TYPE (H): ICD-10-CM

## 2024-01-29 LAB — LVEF ECHO: NORMAL

## 2024-01-29 PROCEDURE — 93306 TTE W/DOPPLER COMPLETE: CPT | Performed by: INTERNAL MEDICINE

## 2024-02-07 ENCOUNTER — MYC MEDICAL ADVICE (OUTPATIENT)
Dept: PULMONOLOGY | Facility: CLINIC | Age: 59
End: 2024-02-07
Payer: COMMERCIAL

## 2024-02-14 ENCOUNTER — TELEPHONE (OUTPATIENT)
Dept: PULMONOLOGY | Facility: CLINIC | Age: 59
End: 2024-02-14
Payer: COMMERCIAL

## 2024-02-14 NOTE — TELEPHONE ENCOUNTER
Signed [facility] paperwork faxed to [facility] ATTN Yulissa Sauceda at [phone].

## 2024-02-15 ENCOUNTER — DOCUMENTATION ONLY (OUTPATIENT)
Dept: PULMONOLOGY | Facility: CLINIC | Age: 59
End: 2024-02-15
Payer: COMMERCIAL

## 2024-02-15 DIAGNOSIS — J44.9 CHRONIC OBSTRUCTIVE PULMONARY DISEASE, UNSPECIFIED COPD TYPE (H): Primary | ICD-10-CM

## 2024-02-15 NOTE — PROGRESS NOTES
Signed pended O2 order.     Abelardo Coelho MD  Pulmonary Disease and Critical Care Medicine  [facility]

## 2024-03-05 ENCOUNTER — OFFICE VISIT (OUTPATIENT)
Dept: PULMONOLOGY | Facility: CLINIC | Age: 59
End: 2024-03-05
Attending: STUDENT IN AN ORGANIZED HEALTH CARE EDUCATION/TRAINING PROGRAM
Payer: COMMERCIAL

## 2024-03-05 VITALS — DIASTOLIC BLOOD PRESSURE: 79 MMHG | HEART RATE: 114 BPM | OXYGEN SATURATION: 97 % | SYSTOLIC BLOOD PRESSURE: 135 MMHG

## 2024-03-05 DIAGNOSIS — J44.9 CHRONIC OBSTRUCTIVE PULMONARY DISEASE, UNSPECIFIED COPD TYPE (H): Primary | ICD-10-CM

## 2024-03-05 PROCEDURE — 99213 OFFICE O/P EST LOW 20 MIN: CPT | Performed by: STUDENT IN AN ORGANIZED HEALTH CARE EDUCATION/TRAINING PROGRAM

## 2024-03-05 PROCEDURE — 99214 OFFICE O/P EST MOD 30 MIN: CPT | Performed by: STUDENT IN AN ORGANIZED HEALTH CARE EDUCATION/TRAINING PROGRAM

## 2024-03-05 RX ORDER — MULTIVITAMIN WITH IRON
1 TABLET ORAL DAILY
COMMUNITY

## 2024-03-05 RX ORDER — ATORVASTATIN CALCIUM 20 MG/1
20 TABLET, FILM COATED ORAL DAILY
COMMUNITY

## 2024-03-05 RX ORDER — ALBUTEROL SULFATE 90 UG/1
2 AEROSOL, METERED RESPIRATORY (INHALATION) EVERY 6 HOURS PRN
Qty: 18 G | Refills: 3 | Status: SHIPPED | OUTPATIENT
Start: 2024-03-05

## 2024-03-05 ASSESSMENT — PAIN SCALES - GENERAL: PAINLEVEL: NO PAIN (0)

## 2024-03-05 NOTE — PROGRESS NOTES
Pulmonary Clinic Note    Date of Service: 3/5/2024     Chief Complaint   Patient presents with    Follow Up     3 month follow up        A/P:  58F being seen for follow up COPD and exertional hypoxemia. Doing well since last visit. Did have one exacerbation since I last saw her, she has recovered well. We discussed pulmonary rehab, she is not interested at this time, she has a plan to increase her exercise regimen.     - continue OEV-BSNH-RDBM (Breztri) twice daily, rinse mouth out after use  - continue prn albuterol, advised to use prior to activity  - encouraged regular exercise  - due for lung Ca screening 12/2024  - OK to substitute medications based on formulary     History:  58F being seen for follow up COPD and exertional hypoxemia. Last seen 11/2023. She was seen by my colleague Dr. Del Angel 12/2023 after exacerbation 2/2 URI, no changes in management. She is prescribed [id number] (Breztri) and prn albuterol. She is using 3L O2 when she is out of the home or more strenuous activity. No SOB at rest. No chest pain or tightness. Does not hear wheezing. Has had more cough recently since URI. Productive of white sputum. No nocturnal cough. Sleeps inclined due to back pain. No PND. No LE edema. Using Breztri. Does not use albuterol, states he does not help her.     She is currently working from home, her work wants her to come back to the office 4x/week which she is concerned about.      Smoking: quit 3/2023, ~50 pack year history   Bird exposure: no             Animal exposure: no        Inhalation exposure: no                           10 point review of systems negative, aside from that mentioned in HPI.    /79 (BP Location: Right arm, Patient Position: Sitting, Cuff Size: Adult Large)   Pulse 114   LMP  (LMP Unknown)   SpO2 97%   Gen: well-appearing  HEENT: Mallampati IV  Card: RRR  Pulm: clear bilaterally   Abd: soft  MSK: no edema, no acute joint abnormality   Skin: no obvious rash  Psych: normal  affect  Neuro: alert and oriented     Labs:  Personally reviewed    Imaging/Studies: Personally reviewed  CT Chest (2023) - mild emphysema, RUL granuloma  CXR (2023) - no acute abnormality, hyperinflation   6MWT (3/2023) - resting hypoxemia, reduced distance, significant desaturation w/o hypoxemia on 4L  PFTs (3/2023) - moderate obstruction, no significant BD response, mild diffusion defect   CT Chest (3/2023) - increased opacities lower lobes, decreased opacities RML and lingula    TTE (2024) - EF 60-65%, grade I diastolic dysfunction    Past Medical History:   Diagnosis Date    COPD (chronic obstructive pulmonary disease) (H)      PSH: reviewed and non-contributory  FHx: reviewed and non-contributory   Social History     Socioeconomic History    Marital status: Single     Spouse name: Not on file    Number of children: Not on file    Years of education: Not on file    Highest education level: Not on file   Occupational History    Not on file   Tobacco Use    Smoking status: Former     Packs/day: 1.00     Years: 39.00     Additional pack years: 0.00     Total pack years: 39.00     Types: Cigarettes     Quit date: 2023     Years since quittin.0     Passive exposure: Never    Smokeless tobacco: Never    Tobacco comments:     2023 counseling done, using patch while admitted, Chantix at home, took workbook   Vaping Use    Vaping Use: Never used   Substance and Sexual Activity    Alcohol use: Not on file    Drug use: Not on file    Sexual activity: Not on file   Other Topics Concern    Not on file   Social History Narrative    Not on file     Social Determinants of Health     Financial Resource Strain: Not on file   Food Insecurity: Not on file   Transportation Needs: Not on file   Physical Activity: Not on file   Stress: Not on file   Social Connections: Not on file   Interpersonal Safety: Not on file   Housing Stability: Not on file       35 minutes spent reviewing chart, reviewing test results,  talking with and examining patient, formulating plan, and documentation on the day of the encounter.    Abelardo Coelho MD  Pulmonary and Critical Care Medicine  [facility]

## 2024-03-05 NOTE — PATIENT INSTRUCTIONS
Continue Breztri twice daily, rinse your mouth out after use. Try using albuterol 5-10 minutes prior to activity to see if this helps your breathing. Slowly increase exercise as tolerated. Please send us an out line of what your work would like from us and I will get one written this week.   
abd pain

## 2024-03-05 NOTE — NURSING NOTE
Chief Complaint   Patient presents with    Follow Up     3 month follow up      Vitals were taken and medications were reconciled.     Celina Figueroa RMA  2:45 PM

## 2024-03-05 NOTE — LETTER
3/5/2024         RE: Danita Zambrano  [address] [place] [address] [address]  [place] [id number]        Dear Colleague,    Thank you for referring your patient, Danita Zambrano, to the [facility] [place]. Please see a copy of my visit note below.    Pulmonary Clinic Note    Date of Service: 3/5/2024     Chief Complaint   Patient presents with     Follow Up     3 month follow up        A/P:  58F being seen for follow up COPD and exertional hypoxemia. Doing well since last visit. Did have one exacerbation since I last saw her, she has recovered well. We discussed pulmonary rehab, she is not interested at this time, she has a plan to increase her exercise regimen.     - continue YGT-XHVW-FINS (Breztri) twice daily, rinse mouth out after use  - continue prn albuterol, advised to use prior to activity  - encouraged regular exercise  - due for lung Ca screening 12/2024  - OK to substitute medications based on formulary     History:  58F being seen for follow up COPD and exertional hypoxemia. Last seen 11/2023. She was seen by my colleague Dr. Del Angel 12/2023 after exacerbation 2/2 URI, no changes in management. She is prescribed UZX-YIRW-MEWL (Breztri) and prn albuterol. She is using 3L O2 when she is out of the home or more strenuous activity. No SOB at rest. No chest pain or tightness. Does not hear wheezing. Has had more cough recently since URI. Productive of white sputum. No nocturnal cough. Sleeps inclined due to back pain. No PND. No LE edema. Using Breztri. Does not use albuterol, states he does not help her.     She is currently working from home, her work wants her to come back to the office 4x/week which she is concerned about.      Smoking: quit 3/2023, ~50 pack year history   Bird exposure: no             Animal exposure: no        Inhalation exposure: no                           10 point review of systems negative, aside from that mentioned in HPI.    /79  (BP Location: Right arm, Patient Position: Sitting, Cuff Size: Adult Large)   Pulse 114   LMP  (LMP Unknown)   SpO2 97%   Gen: well-appearing  HEENT: Mallampati IV  Card: RRR  Pulm: clear bilaterally   Abd: soft  MSK: no edema, no acute joint abnormality   Skin: no obvious rash  Psych: normal affect  Neuro: alert and oriented     Labs:  Personally reviewed    Imaging/Studies: Personally reviewed  CT Chest (2023) - mild emphysema, RUL granuloma  CXR (2023) - no acute abnormality, hyperinflation   6MWT (3/2023) - resting hypoxemia, reduced distance, significant desaturation w/o hypoxemia on 4L  PFTs (3/2023) - moderate obstruction, no significant BD response, mild diffusion defect   CT Chest (3/2023) - increased opacities lower lobes, decreased opacities RML and lingula    TTE (2024) - EF 60-65%, grade I diastolic dysfunction    Past Medical History:   Diagnosis Date     COPD (chronic obstructive pulmonary disease) (H)      PSH: reviewed and non-contributory  FHx: reviewed and non-contributory   Social History     Socioeconomic History     Marital status: Single     Spouse name: Not on file     Number of children: Not on file     Years of education: Not on file     Highest education level: Not on file   Occupational History     Not on file   Tobacco Use     Smoking status: Former     Packs/day: 1.00     Years: 39.00     Additional pack years: 0.00     Total pack years: 39.00     Types: Cigarettes     Quit date: 2023     Years since quittin.0     Passive exposure: Never     Smokeless tobacco: Never     Tobacco comments:     2023 counseling done, using patch while admitted, Chantix at home, took workbook   Vaping Use     Vaping Use: Never used   Substance and Sexual Activity     Alcohol use: Not on file     Drug use: Not on file     Sexual activity: Not on file   Other Topics Concern     Not on file   Social History Narrative     Not on file     Social Determinants of Health     Financial  Resource Strain: Not on file   Food Insecurity: Not on file   Transportation Needs: Not on file   Physical Activity: Not on file   Stress: Not on file   Social Connections: Not on file   Interpersonal Safety: Not on file   Housing Stability: Not on file       35 minutes spent reviewing chart, reviewing test results, talking with and examining patient, formulating plan, and documentation on the day of the encounter.    Abelardo Coelho MD  Pulmonary and Critical Care Medicine  [facility]       Again, thank you for allowing me to participate in the care of your patient.        Sincerely,        Abelardo Coelho MD

## 2024-03-12 ENCOUNTER — PATIENT OUTREACH (OUTPATIENT)
Dept: CARE COORDINATION | Facility: CLINIC | Age: 59
End: 2024-03-12
Payer: COMMERCIAL

## 2024-03-26 ENCOUNTER — PATIENT OUTREACH (OUTPATIENT)
Dept: CARE COORDINATION | Facility: CLINIC | Age: 59
End: 2024-03-26
Payer: COMMERCIAL

## 2024-04-24 ENCOUNTER — PATIENT OUTREACH (OUTPATIENT)
Dept: CARE COORDINATION | Facility: CLINIC | Age: 59
End: 2024-04-24
Payer: COMMERCIAL

## 2024-05-19 ENCOUNTER — HEALTH MAINTENANCE LETTER (OUTPATIENT)
Age: 59
End: 2024-05-19

## 2024-05-20 ENCOUNTER — MYC REFILL (OUTPATIENT)
Dept: PULMONOLOGY | Facility: CLINIC | Age: 59
End: 2024-05-20
Payer: COMMERCIAL

## 2024-05-20 DIAGNOSIS — J44.9 CHRONIC OBSTRUCTIVE PULMONARY DISEASE, UNSPECIFIED COPD TYPE (H): ICD-10-CM

## 2024-05-20 RX ORDER — BUDESONIDE, GLYCOPYRROLATE, AND FORMOTEROL FUMARATE 160; 9; 4.8 UG/1; UG/1; UG/1
2 AEROSOL, METERED RESPIRATORY (INHALATION) 2 TIMES DAILY
Qty: 10.7 G | Refills: 5 | Status: SHIPPED | OUTPATIENT
Start: 2024-05-20

## 2024-05-20 NOTE — TELEPHONE ENCOUNTER
Requested Prescriptions   Pending Prescriptions Disp Refills    budeson-glycopyrrol-formoterol (BREZTRI AEROSPHERE) 160-9-4.8 MCG/ACT AERO inhaler 10.7 g 3     Sig: Inhale 2 puffs into the lungs 2 times daily       There is no refill protocol information for this order        Last Written Prescription Date:  11/28/23  Last Fill Quantity: 10.7g,  # refills: 3   Last office visit: 3/5/2024 ; last virtual visit: Visit date not found with prescribing provider:  Dr Coelho   Future Office Visit:  6/4/24    Refill sent   Randy Salas RN

## 2024-05-22 ENCOUNTER — PATIENT OUTREACH (OUTPATIENT)
Dept: CARE COORDINATION | Facility: CLINIC | Age: 59
End: 2024-05-22
Payer: COMMERCIAL

## 2024-06-04 ENCOUNTER — OFFICE VISIT (OUTPATIENT)
Dept: PULMONOLOGY | Facility: CLINIC | Age: 59
End: 2024-06-04
Attending: STUDENT IN AN ORGANIZED HEALTH CARE EDUCATION/TRAINING PROGRAM
Payer: COMMERCIAL

## 2024-06-04 VITALS — DIASTOLIC BLOOD PRESSURE: 81 MMHG | OXYGEN SATURATION: 95 % | SYSTOLIC BLOOD PRESSURE: 125 MMHG | HEART RATE: 103 BPM

## 2024-06-04 DIAGNOSIS — Z87.891 PERSONAL HISTORY OF TOBACCO USE: ICD-10-CM

## 2024-06-04 DIAGNOSIS — J44.9 CHRONIC OBSTRUCTIVE PULMONARY DISEASE, UNSPECIFIED COPD TYPE (H): Primary | ICD-10-CM

## 2024-06-04 PROCEDURE — 99213 OFFICE O/P EST LOW 20 MIN: CPT | Mod: 25 | Performed by: STUDENT IN AN ORGANIZED HEALTH CARE EDUCATION/TRAINING PROGRAM

## 2024-06-04 PROCEDURE — G0296 VISIT TO DETERM LDCT ELIG: HCPCS | Performed by: STUDENT IN AN ORGANIZED HEALTH CARE EDUCATION/TRAINING PROGRAM

## 2024-06-04 NOTE — LETTER
[facility] FOR [facility] SCI[facility] CLINIC [address] [address]  Phone: [phone]  Fax: [phone]    June 4, 2024        Danita Zambrano  [address] [place] [id number]        ATTN Sue Sauceda    RE: Danita Zambrano    Based on Ms. Zambrano's underlying medical condition I recommend she continue to work from home three times a week or more frequently during the winter if there is snow. I recommend these limitations be in effect for 1 year until 6/5/25.    Please contact me for questions or concerns.      Sincerely,      Abelardo Coelho

## 2024-06-04 NOTE — NURSING NOTE
Chief Complaint   Patient presents with    General Visit     F/u     Vitals were taken and medications were reconciled.     LUANNE Reyes

## 2024-06-04 NOTE — LETTER
[facility] FOR [facility]G SCIENCE AND HEALTH [address] [address]  Phone: [phone]  Fax: [phone]    June 4, 2024        Danita HUANG Zambrano  [address] [place] [address]          To whom it may concern:    RE: Danitagigi Zambrano    Based on Ms. Zambrano's underlying medical condition I recommend she continue to work from home twice weekly or more frequently during the winter if there is snow.     Please contact me for questions or concerns.      Sincerely,      Abelardo Coelho

## 2024-06-04 NOTE — LETTER
6/4/2024      Danita Zambrano  [address] [place] [address] [address]  [facility] [address]      Dear Colleague,    Thank you for referring your patient, Danita Zambrano, to the [facility]IN[facility] [place]. Please see a copy of my visit note below.    Pulmonary Clinic Note    Date of Service: 6/4/24    Chief Complaint   Patient presents with     General Visit     F/u       A/P:    #Moderate COPD  Danita is a 59 year-old female being seen for follow up for COPD and exertional hypoxemia. Last seen 3/2024. She is prescribed GOA-LKZM-UNCY (Breztri) and prn albuterol. She is using 3L O2.  Overall she is doing better. She is still experiencing MARI, but she endorses that the PRN albuterol has been helping.     - continue AYL-VBRC-WALO (Breztri) twice daily, rinse mouth out after use  - continue using the PRN albuterol as needed for SOB and prior to physical activity   - She completed about half of pulmonary rehab previously as she did not care for the place she was at. She was open to trying pulmonary rehab again if she could go to a different location. She was interested in the [facility] location.   - She will be due for lung cancer screening 12/2024. Order placed to schedule CT for December   - Note provided for Danita's work in order to continue to work from home half of her work week and for during periods of heavy snow due to her COPD  - continue working on healthy lifestyles with diet, meal prepping, and exercise   - OK to substitute medications based on formulary     History:  59F being seen for follow up COPD and exertional hypoxemia. Last seen 3/2024. She is prescribed UJH-FTZG-MNQR (Breztri) and prn albuterol. She is using 3L O2.     Breathes better with the Breztri, endorsing an improvement with her SOB. She doesn't notice a difference in the frequency of her cough after starting Breztri, though. She has been using her albuterol typically BID - once in the morning prior to work  as she has a long walk that includes stairs, and once prior to physical activity. She has been working on increasing her physical activity by walking down the stairs now. Rides the bike at her apartment 10 minutes/twice a week. Increasing pedaling in her apartment. She previously completed about half of pulm rehab, but stopped about [facility] through as she did not care for the location. She endorses that she needs to lose weight, which she believes will also help her breathing to some degree. She is working on changing her diet.     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 She needs an updated doctor's note to continue to work from home 2 days a week. She is also is concerned about shoveling snow around the garage at her apartment. Her apartment building shovels the snow, but not in time for her to get to work. She was wondering if she could also have an exemption to work from home during periods of heavy snow.       Smoking: quit 3/2023, ~50 pack year history   Bird exposure: no             Animal exposure: no        Inhalation exposure: no                          10 point review of systems negative, aside from that mentioned in HPI.    /81   Pulse 103   LMP  (LMP Unknown)   SpO2 95%   Gen: well-appearing  HEENT: Mallampati +1  Card: RRR  Pulm: clear and diminished bilaterally. No crackles or wheezes. Unlabored. On 3LNC  Abd: soft  MSK: no edema, no acute joint abnormality   Skin: no obvious rash  Psych: normal affect  Neuro: alert and oriented     Labs:  Personally reviewed    Imaging/Studies: Personally reviewed  CT Chest (12/2023) - mild emphysema, RUL granuloma  CXR (11/2023) - no acute abnormality, hyperinflation   6MWT (3/2023) - resting hypoxemia, reduced distance, significant desaturation w/o hypoxemia on 4L  PFTs (3/2023) - moderate obstruction, no significant BD response, mild diffusion defect   CT Chest (3/2023) -  increased opacities lower lobes, decreased opacities RML and lingula     TTE (2024) - EF 60-65%, grade I diastolic dysfunction    Past Medical History:   Diagnosis Date     COPD (chronic obstructive pulmonary disease) (H)      PSH: reviewed and non-contributory  FHx: reviewed and non-contributory   Social History     Socioeconomic History     Marital status: Single     Spouse name: Not on file     Number of children: Not on file     Years of education: Not on file     Highest education level: Not on file   Occupational History     Not on file   Tobacco Use     Smoking status: Former     Current packs/day: 0.00     Average packs/day: 1 pack/day for 39.0 years (39.0 ttl pk-yrs)     Types: Cigarettes     Start date: 1984     Quit date: 2023     Years since quittin.2     Passive exposure: Never     Smokeless tobacco: Never     Tobacco comments:     2023 counseling done, using patch while admitted, Chantix at home, took workbook   Vaping Use     Vaping status: Never Used   Substance and Sexual Activity     Alcohol use: Not on file     Drug use: Not on file     Sexual activity: Not on file   Other Topics Concern     Not on file   Social History Narrative     Not on file     Social Determinants of Health     Financial Resource Strain: Not on file   Food Insecurity: Not on file   Transportation Needs: Not on file   Physical Activity: Not on file   Stress: Not on file   Social Connections: Not on file   Interpersonal Safety: Not on file   Housing Stability: Not on file       35 minutes spent reviewing chart, reviewing test results, talking with and examining patient, formulating plan, and documentation on the day of the encounter.    Abelardo Coelho MD  Pulmonary and Critical Care Medicine  [facility]       Lung Cancer Screening Shared Decision Making Visit     Danita Zambrano, a 59 year old female, is eligible for lung cancer screening    History   Smoking Status     Former     Packs/day: 1.00      Years: 39.00     Types: Cigarettes     Quit date: 2/23/2023   Smokeless Tobacco     Never   {TIP  Follow this link to update the tobacco history if needed :399999}    I have discussed with patient the risks and benefits of screening for lung cancer with low-dose CT.     The risks include:    radiation exposure: one low dose chest CT has as much ionizing radiation as about 15 chest x-rays, or 6 months of background radiation living in [place]      false positives: most findings/nodules are NOT cancer, but some might still require additional diagnostic evaluation, including biopsy    over-diagnosis: some slow growing cancers that might never have been clinically significant will be detected and treated unnecessarily     The benefit of early detection of lung cancer is contingent upon adherence to annual screening or more frequent follow up if indicated.     Furthermore, to benefit from screening, Danita must be willing and able to undergo diagnostic procedures, if indicated. Although no specific guide is available for determining severity of comorbidities, it is reasonable to withhold screening in patients who have greater mortality risk from other diseases.     We did discuss that the best way to prevent lung cancer is to not smoke.    Some patients may value a numeric estimation of lung cancer risk when evaluating if lung cancer screening is right for them, here is one calculator:    ShouldIScreen      Again, thank you for allowing me to participate in the care of your patient.        Sincerely,        Abelardo Coelho MD

## 2024-06-04 NOTE — LETTER
[facility] FOR [facility] SCIENCE AND HE[facility] [address] [address]  Phone: [phone]  Fax: [phone]    June 4, 2024        Danita HUANG Zambrano  [address] [place] [id number]          To whom it may concern:    RE: Danita Zambrano    Based on Ms. Zambrano's underlying medical condition I recommend she continue to work from home twice weekly.     Please contact me for questions or concerns.      Sincerely,      Abelardo Coelho

## 2024-06-04 NOTE — LETTER
[facility] FOR [facility] SCIENCE [facility] CLINIC [address] [address]  Phone: [phone]  Fax: [phone]    June 4, 2024        Danita HUANG Zambrano  [address] [place] [id number]          To whom it may concern:    RE: Danitagigi Zambrano    Based on Ms. Zambrano's underlying medical condition I recommend she continue to work from home three times a week or more frequently during the winter if there is snow.     Please contact me for questions or concerns.      Sincerely,      Abelardo Coelho

## 2024-06-04 NOTE — PATIENT INSTRUCTIONS
Lung Cancer Screening   Frequently Asked Questions  If you are at high-risk for lung cancer, getting screened with low-dose computed tomography (LDCT) every year can help save your life. This handout offers answers to some of the most common questions about lung cancer screening. If you have other questions, please call [phone] ([phone]).     What is it?  Lung cancer screening uses special X-ray technology to create an image of your lung tissue. The exam is quick and easy and takes less than 10 seconds. We don t give you any medicine or use any needles. You can eat before and after the exam. You don t need to change your clothes as long as the clothing on your chest doesn t contain metal. But, you do need to be able to hold your breath for at least 6 seconds during the exam.    What is the goal of lung cancer screening?  The goal of lung cancer screening is to save lives. Many times, lung cancer is not found until a person starts having physical symptoms. Lung cancer screening can help detect lung cancer in the earliest stages when it may be easier to treat.    Who should be screened for lung cancer?  We suggest lung cancer screening for anyone who is at high-risk for lung cancer. You are in the high-risk group if you:      are between the ages of 55 and 79, and    have smoked at least 1 pack of cigarettes a day for 20 or more years, and    still smoke or have quit within the past 15 years.    However, if you have a new cough or shortness of breath, you should talk to your doctor before being screened.    Why does it matter if I have symptoms?  Certain symptoms can be a sign that you have a condition in your lungs that should be checked and treated by your doctor. These symptoms include fever, chest pain, a new or changing cough, shortness of breath that you have never felt before, coughing up blood or unexplained weight loss. Having any of these symptoms can greatly affect the results of lung  cancer screening.       Should all smokers get an LDCT lung cancer screening exam?  It depends. Lung cancer screening is for a very specific group of men and women who have a history of heavy smoking over a long period of time (see  Who should be screened for lung cancer  above).  I am in the high-risk group, but have been diagnosed with cancer in the past. Is LDCT lung cancer screening right for me?  In some cases, you should not have LDCT lung screening, such as when your doctor is already following your cancer with CT scan studies. Your doctor will help you decide if LDCT lung screening is right for you.  Do I need to have a screening exam every year?  Yes. If you are in the high-risk group described earlier, you should get an LDCT lung cancer screening exam every year until you are 79, or are no longer willing or able to undergo screening and possible procedures to diagnose and treat lung cancer.  How effective is LDCT at preventing death from lung cancer?  Studies have shown that LDCT lung cancer screening can lower the risk of death from lung cancer by 20 percent in people who are at high-risk.  What are the risks?  There are some risks and limitations of LDCT lung cancer screening. We want to make sure you understand the risks and benefits, so please let us know if you have any questions. Your doctor may want to talk with you more about these risks.    Radiation exposure: As with any exam that uses radiation, there is a very small increased risk of cancer. The amount of radiation in LDCT is small--about the same amount a person would get from a mammogram. Your doctor orders the exam when he or she feels the potential benefits outweigh the risks.    False negatives: No test is perfect, including LDCT. It is possible that you may have a medical condition, including lung cancer, that is not found during your exam. This is called a false negative result.    False positives and more testing: LDCT very often finds  something in the lung that could be cancer, but in fact is not. This is called a false positive result. False positive tests often cause anxiety. To make sure these findings are not cancer, you may need to have more tests. These tests will be done only if you give us permission. Sometimes patients need a treatment that can have side effects, such as a biopsy. For more information on false positives, see  What can I expect from the results?     Findings not related to lung cancer: Your LDCT exam also takes pictures of areas of your body next to your lungs. In a very small number of cases, the CT scan will show an abnormal finding in one of these areas, such as your kidneys, adrenal glands, liver or thyroid. This finding may not be serious, but you may need more tests. Your doctor can help you decide what other tests you may need, if any.  What can I expect from the results?  About 1 out of 4 LDCT exams will find something that may need more tests. Most of the time, these findings are lung nodules. Lung nodules are very small collections of tissue in the lung. These nodules are very common, and the vast majority--more than 97 percent--are not cancer (benign). Most are normal lymph nodes or small areas of scarring from past infections.  But, if a small lung nodule is found to be cancer, the cancer can be cured more than 90 percent of the time. To know if the nodule is cancer, we may need to get more images before your next yearly screening exam. If the nodule has suspicious features (for example, it is large, has an odd shape or grows over time), we will refer you to a specialist for further testing.  Will my doctor also get the results?  Yes. Your doctor will get a copy of your results.  Is it okay to keep smoking now that there s a cancer screening exam?  No. Tobacco is one of the strongest cancer-causing agents. It causes not only lung cancer, but other cancers and cardiovascular (heart) diseases as well. The damage  caused by smoking builds over time. This means that the longer you smoke, the higher your risk of disease. While it is never too late to quit, the sooner you quit, the better.  Where can I find help to quit smoking?  The best way to prevent lung cancer is to stop smoking. If you have already quit smoking, congratulations and keep it up! For help on quitting smoking, please call [employer] at 5-222-QUITNOW (1-739.401.8143) or the American Cancer Society at 1-934.645.2012 to find local resources near you.  One-on-one health coaching:  If you d prefer to work individually with a health care provider on tobacco cessation, we offer:      Medication Therapy Management:  Our specially trained pharmacists work closely with you and your doctor to help you quit smoking.  Call [phone] or [phone] (toll free).

## 2024-06-04 NOTE — PROGRESS NOTES
Pulmonary Clinic Note    Date of Service: 6/4/24    Chief Complaint   Patient presents with    General Visit     F/u       A/P:    #Moderate COPD  Danita is a 59 year-old female being seen for follow up for COPD and exertional hypoxemia. Last seen 3/2024. She is prescribed TIX-MRUP-EXTI (Breztri) and prn albuterol. She is using 3L O2.  Overall she is doing better. She is still experiencing MARI, but she endorses that the PRN albuterol has been helping.     - continue DHX-WCZM-BXPI (Breztri) twice daily, rinse mouth out after use  - continue using the PRN albuterol as needed for SOB and prior to physical activity   - She completed about half of pulmonary rehab previously as she did not care for the place she was at. She was open to trying pulmonary rehab again if she could go to a different location. She was interested in the [facility] location.   - She will be due for lung cancer screening 12/2024. Order placed to schedule CT for December   - Note provided for Danita's work in order to continue to work from home half of her work week and for during periods of heavy snow due to her COPD  - continue working on healthy lifestyles with diet, meal prepping, and exercise   - OK to substitute medications based on formulary     History:  59F being seen for follow up COPD and exertional hypoxemia. Last seen 3/2024. She is prescribed GPD-MTKH-COKX (Breztri) and prn albuterol. She is using 3L O2.     Breathes better with the Breztri, endorsing an improvement with her SOB. She doesn't notice a difference in the frequency of her cough after starting Breztri, though. She has been using her albuterol typically BID - once in the morning prior to work as she has a long walk that includes stairs, and once prior to physical activity. She has been working on increasing her physical activity by walking down the stairs now. Rides the bike at her apartment 10 minutes/twice a week. Increasing pedaling in her apartment. She previously  completed about half of pulm rehab, but [facility] as she did not care for the location. She endorses that she needs to lose weight, which she believes will also help her breathing to some degree. She is working on changing her diet.     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 She needs an updated doctor's note to continue to work from home 2 days a week. She is also is concerned about shoveling snow around the garage at her apartment. Her apartment building shovels the snow, but not in time for her to get to work. She was wondering if she could also have an exemption to work from home during periods of heavy snow.       Smoking: quit 3/2023, ~50 pack year history   Bird exposure: no             Animal exposure: no        Inhalation exposure: no                          10 point review of systems negative, aside from that mentioned in HPI.    /81   Pulse 103   LMP  (LMP Unknown)   SpO2 95%   Gen: well-appearing  HEENT: Mallampati +1  Card: RRR  Pulm: clear and diminished bilaterally. No crackles or wheezes. Unlabored. On 3LNC  Abd: soft  MSK: no edema, no acute joint abnormality   Skin: no obvious rash  Psych: normal affect  Neuro: alert and oriented     Labs:  Personally reviewed    Imaging/Studies: Personally reviewed  CT Chest (12/2023) - mild emphysema, RUL granuloma  CXR (11/2023) - no acute abnormality, hyperinflation   6MWT (3/2023) - resting hypoxemia, reduced distance, significant desaturation w/o hypoxemia on 4L  PFTs (3/2023) - moderate obstruction, no significant BD response, mild diffusion defect   CT Chest (3/2023) - increased opacities lower lobes, decreased opacities RML and lingula     TTE (1/2024) - EF 60-65%, grade I diastolic dysfunction    Past Medical History:   Diagnosis Date    COPD (chronic obstructive pulmonary disease) (H)      PSH: reviewed and non-contributory  FHx: reviewed and  non-contributory   Social History     Socioeconomic History    Marital status: Single     Spouse name: Not on file    Number of children: Not on file    Years of education: Not on file    Highest education level: Not on file   Occupational History    Not on file   Tobacco Use    Smoking status: Former     Current packs/day: 0.00     Average packs/day: 1 pack/day for 39.0 years (39.0 ttl pk-yrs)     Types: Cigarettes     Start date: 1984     Quit date: 2023     Years since quittin.2     Passive exposure: Never    Smokeless tobacco: Never    Tobacco comments:     2023 counseling done, using patch while admitted, Chantix at home, took workbook   Vaping Use    Vaping status: Never Used   Substance and Sexual Activity    Alcohol use: Not on file    Drug use: Not on file    Sexual activity: Not on file   Other Topics Concern    Not on file   Social History Narrative    Not on file     Social Determinants of Health     Financial Resource Strain: Not on file   Food Insecurity: Not on file   Transportation Needs: Not on file   Physical Activity: Not on file   Stress: Not on file   Social Connections: Not on file   Interpersonal Safety: Not on file   Housing Stability: Not on file       35 minutes spent reviewing chart, reviewing test results, talking with and examining patient, formulating plan, and documentation on the day of the encounter, excluding lung cancer screening discussion.    Abelardo Coelho MD  Pulmonary and Critical Care Medicine  [facility]       Lung Cancer Screening Shared Decision Making Visit     Danita Zambrano, a 59 year old female, is eligible for lung cancer screening    History   Smoking Status    Former    Packs/day: 1.00    Years: 39.00    Types: Cigarettes    Quit date: 2023   Smokeless Tobacco    Never       I have discussed with patient the risks and benefits of screening for lung cancer with low-dose CT.     The risks include:    radiation exposure: one low dose  chest CT has as much ionizing radiation as about 15 chest x-rays, or 6 months of background radiation living in [place]      false positives: most findings/nodules are NOT cancer, but some might still require additional diagnostic evaluation, including biopsy    over-diagnosis: some slow growing cancers that might never have been clinically significant will be detected and treated unnecessarily     The benefit of early detection of lung cancer is contingent upon adherence to annual screening or more frequent follow up if indicated.     Furthermore, to benefit from screening, Danita must be willing and able to undergo diagnostic procedures, if indicated. Although no specific guide is available for determining severity of comorbidities, it is reasonable to withhold screening in patients who have greater mortality risk from other diseases.     We did discuss that the best way to prevent lung cancer is to not smoke.    Some patients may value a numeric estimation of lung cancer risk when evaluating if lung cancer screening is right for them, here is one calculator:    ShouldIScreen

## 2024-07-28 ENCOUNTER — HEALTH MAINTENANCE LETTER (OUTPATIENT)
Age: 59
End: 2024-07-28

## 2024-09-03 ENCOUNTER — TELEPHONE (OUTPATIENT)
Dept: PULMONOLOGY | Facility: CLINIC | Age: 59
End: 2024-09-03
Payer: COMMERCIAL

## 2024-09-03 DIAGNOSIS — J44.9 CHRONIC OBSTRUCTIVE PULMONARY DISEASE, UNSPECIFIED COPD TYPE (H): Primary | ICD-10-CM

## 2024-09-03 RX ORDER — PREDNISONE 10 MG/1
TABLET ORAL
Qty: 38 TABLET | Refills: 0 | Status: SHIPPED | OUTPATIENT
Start: 2024-09-03 | End: 2024-09-17

## 2024-09-03 RX ORDER — AZITHROMYCIN 250 MG/1
500 TABLET, FILM COATED ORAL DAILY
Qty: 5 TABLET | Refills: 0 | Status: SHIPPED | OUTPATIENT
Start: 2024-09-03

## 2024-09-03 NOTE — TELEPHONE ENCOUNTER
[facility] Call Center    Phone Message    May a detailed message be left on voicemail: yes     Reason for Call: Symptoms or Concerns     Current symptom or concern: Runny nose, cough and SOB- negative covid test    Symptoms have been present for:  1.5 week(s)    Are there any new or worsening symptoms? Yes: SOB got worse on Saturday- pt has been wearing O2 at home more.     Action Taken: Message routed to:  [facility] ([facility]): Pulm     Travel Screening: Not Applicable     Date of Service: 9/3

## 2024-09-03 NOTE — TELEPHONE ENCOUNTER
Dr. Coelho told RN that he wanted Azithromycin 500 for 5 days and a Prednisone taper of 40 for 5 days, 30 for 3 days, 20 for 3 days, and 10 for 3 days.    Gabi Snell RN  Pulmonary Nurse Care Coordinator

## 2024-09-03 NOTE — TELEPHONE ENCOUNTER
RN called and talked with pt. Pt reports shortness of breath, a runny nose, fatigue, and a cough. She is able to produce phlem sometimes. Pt reports it being very thick, creamy, and sticky. She is covid negative. Normally does not wear oxygen just around the house. Now she is on 3L almost all the time. Pt reports no fever, no chills, no night sweats, and no wheezing.     Pt reports still using her Breztri as prescribed and is using the Albuterol inhaler but doesn't think that it helps that much.    RN did let her know that if her breathing gets worse to go in to the ED to be seen.    Pt requesting either prednisone or an abx.     Gabi Snell RN  Pulmonary Nurse Care Coordinator

## 2024-11-20 ENCOUNTER — MYC MEDICAL ADVICE (OUTPATIENT)
Dept: PULMONOLOGY | Facility: CLINIC | Age: 59
End: 2024-11-20
Payer: COMMERCIAL

## 2024-11-20 DIAGNOSIS — J44.1 COPD EXACERBATION (H): Primary | ICD-10-CM

## 2024-11-20 RX ORDER — PREDNISONE 20 MG/1
40 TABLET ORAL DAILY
Qty: 10 TABLET | Refills: 0 | Status: SHIPPED | OUTPATIENT
Start: 2024-11-20 | End: 2024-11-25

## 2024-11-20 RX ORDER — AZITHROMYCIN 250 MG/1
TABLET, FILM COATED ORAL
Qty: 6 TABLET | Refills: 0 | Status: SHIPPED | OUTPATIENT
Start: 2024-11-20 | End: 2024-11-25

## 2024-11-20 NOTE — TELEPHONE ENCOUNTER
Writer called patient to follow up on [employer] message. Patient has not been feeling well since Sunday. Low grade fever, body aches, SOB with activity, productive cough. Using 3L oxygen with activity. Does not have an oximeter as she stated they do not work on her finger. Compliant with Breztri, using albuterol prn with some relief. Last time she had these symptoms (early September) - antibiotics and prednisone worked well.    Writer forwarded message to Dr. Coelho to advise.

## 2024-12-02 NOTE — PROGRESS NOTES
"Pulmonary Clinic Note    Date of Service: 12/3/2024     Chief Complaint   Patient presents with    RECHECK     Return visit        A/P:  59F being seen for follow up COPD and exertional hypoxemia. Two recent exacerbations. She has had slow decline since last visit.     - referral to pulmonary rehab at [facility]  - continue ZEF-HBVL-FJUB (Breztri) twice daily, rinse mouth out after use  - continue prn albuterol and duonebs  - will consider chronic azithromycin vs roflumilast vs dupilumab at next visit pending clinical course  - check CBC w/ diff  - OK to substitute medications based on formulary     History:  59F being seen for follow up COPD and exertional hypoxemia. Last seen 6/2024. She is prescribed MIB-UIWL-IPUV (Breztri), prn duonebs and prn albuterol. She was given azithromycin and prednisone taper 9/2024 and 11/2024. She still is not feeling well. Feeling lethargic. No SOB at rest. MARI minimal to moderate activity. No chest pain or tightness. No wheezing, only if experiencing exacerbation. More cough than she used to, but not too bad, productive of white sputum. No nocturnal cough. No orthopnea or PND. No LE edema. Did have a fever prior to receiving ABx in November. No night sweats. No wt loss. Using Breztri, can tell when she forgets to use it. Using albuterol prior to activity, it does help. Not using nebulizer. Using 3L O2, doesn't use much at home, but does wear w/ activity, does use it when she leaves the house. Has a watch that checks SpO2, unsure how accurate it is, but typically in the 90s.     Smoking: quit 3/2023, ~50 pack year history   Bird exposure: no             Animal exposure: no        Inhalation exposure: no                      10 point review of systems negative, aside from that mentioned in HPI.    /85   Pulse 85   Ht 1.626 m (5' 4\")   LMP  (LMP Unknown)   SpO2 91%   BMI 40.34 kg/m    Gen: well-appearing  HEENT: Mallampati IV  Card: RRR  Pulm: diffusely diminished   Abd: " soft  MSK: no edema, no acute joint abnormality   Skin: no obvious rash  Psych: normal affect  Neuro: alert and oriented     Labs:  Personally reviewed    Imaging/Studies: Personally reviewed  CT Chest (2023) - mild emphysema, RUL granuloma  CXR (2023) - no acute abnormality, hyperinflation   6MWT (3/2023) - resting hypoxemia, reduced distance, significant desaturation w/o hypoxemia on 4L  PFTs (3/2023) - moderate obstruction, no significant BD response, mild diffusion defect   CT Chest (3/2023) - increased opacities lower lobes, decreased opacities RML and lingula     TTE (2024) - EF 60-65%, grade I diastolic dysfunction    Past Medical History:   Diagnosis Date    COPD (chronic obstructive pulmonary disease) (H)      PSH: reviewed and non-contributory  FHx: reviewed and non-contributory   Social History     Socioeconomic History    Marital status: Single     Spouse name: Not on file    Number of children: Not on file    Years of education: Not on file    Highest education level: Not on file   Occupational History    Not on file   Tobacco Use    Smoking status: Former     Current packs/day: 0.00     Average packs/day: 1 pack/day for 39.0 years (39.0 ttl pk-yrs)     Types: Cigarettes     Start date: 1984     Quit date: 2023     Years since quittin.7     Passive exposure: Never    Smokeless tobacco: Never    Tobacco comments:     2023 counseling done, using patch while admitted, Chantix at home, took workbook   Vaping Use    Vaping status: Never Used   Substance and Sexual Activity    Alcohol use: Not on file    Drug use: Not on file    Sexual activity: Not on file   Other Topics Concern    Not on file   Social History Narrative    Not on file     Social Drivers of Health     Financial Resource Strain: Not on file   Food Insecurity: Not on file   Transportation Needs: Not on file   Physical Activity: Not on file   Stress: Not on file   Social Connections: Not on file   Interpersonal Safety:  Not on file   Housing Stability: Not on file       35 minutes spent reviewing chart, reviewing test results, talking with and examining patient, formulating plan, and documentation on the day of the encounter.    Abelardo Coelho MD  Pulmonary and Critical Care Medicine  [facility]

## 2024-12-03 ENCOUNTER — LAB (OUTPATIENT)
Dept: LAB | Facility: CLINIC | Age: 59
End: 2024-12-03
Attending: STUDENT IN AN ORGANIZED HEALTH CARE EDUCATION/TRAINING PROGRAM
Payer: COMMERCIAL

## 2024-12-03 ENCOUNTER — OFFICE VISIT (OUTPATIENT)
Dept: PULMONOLOGY | Facility: CLINIC | Age: 59
End: 2024-12-03
Attending: STUDENT IN AN ORGANIZED HEALTH CARE EDUCATION/TRAINING PROGRAM
Payer: COMMERCIAL

## 2024-12-03 ENCOUNTER — ANCILLARY PROCEDURE (OUTPATIENT)
Dept: CT IMAGING | Facility: CLINIC | Age: 59
End: 2024-12-03
Attending: STUDENT IN AN ORGANIZED HEALTH CARE EDUCATION/TRAINING PROGRAM
Payer: COMMERCIAL

## 2024-12-03 ENCOUNTER — TELEPHONE (OUTPATIENT)
Dept: PULMONOLOGY | Facility: CLINIC | Age: 59
End: 2024-12-03

## 2024-12-03 VITALS
HEART RATE: 85 BPM | SYSTOLIC BLOOD PRESSURE: 131 MMHG | HEIGHT: 64 IN | OXYGEN SATURATION: 93 % | BODY MASS INDEX: 40.34 KG/M2 | DIASTOLIC BLOOD PRESSURE: 85 MMHG

## 2024-12-03 DIAGNOSIS — Z87.891 PERSONAL HISTORY OF TOBACCO USE: ICD-10-CM

## 2024-12-03 DIAGNOSIS — J44.9 CHRONIC OBSTRUCTIVE PULMONARY DISEASE, UNSPECIFIED COPD TYPE (H): Primary | ICD-10-CM

## 2024-12-03 DIAGNOSIS — J44.9 CHRONIC OBSTRUCTIVE PULMONARY DISEASE, UNSPECIFIED COPD TYPE (H): ICD-10-CM

## 2024-12-03 LAB
BASOPHILS # BLD AUTO: 0 10E3/UL (ref 0–0.2)
BASOPHILS NFR BLD AUTO: 0 %
EOSINOPHIL # BLD AUTO: 0.3 10E3/UL (ref 0–0.7)
EOSINOPHIL NFR BLD AUTO: 3 %
ERYTHROCYTE [DISTWIDTH] IN BLOOD BY AUTOMATED COUNT: 15.9 % (ref 10–15)
HCT VFR BLD AUTO: 49.9 % (ref 35–47)
HGB BLD-MCNC: 14.6 G/DL (ref 11.7–15.7)
IMM GRANULOCYTES # BLD: 0 10E3/UL
IMM GRANULOCYTES NFR BLD: 0 %
LYMPHOCYTES # BLD AUTO: 1.3 10E3/UL (ref 0.8–5.3)
LYMPHOCYTES NFR BLD AUTO: 13 %
MCH RBC QN AUTO: 26.9 PG (ref 26.5–33)
MCHC RBC AUTO-ENTMCNC: 29.3 G/DL (ref 31.5–36.5)
MCV RBC AUTO: 92 FL (ref 78–100)
MONOCYTES # BLD AUTO: 0.6 10E3/UL (ref 0–1.3)
MONOCYTES NFR BLD AUTO: 6 %
NEUTROPHILS # BLD AUTO: 7.6 10E3/UL (ref 1.6–8.3)
NEUTROPHILS NFR BLD AUTO: 77 %
NRBC # BLD AUTO: 0 10E3/UL
NRBC BLD AUTO-RTO: 0 /100
PLATELET # BLD AUTO: 316 10E3/UL (ref 150–450)
RBC # BLD AUTO: 5.43 10E6/UL (ref 3.8–5.2)
WBC # BLD AUTO: 9.9 10E3/UL (ref 4–11)

## 2024-12-03 PROCEDURE — 85025 COMPLETE CBC W/AUTO DIFF WBC: CPT | Performed by: PATHOLOGY

## 2024-12-03 PROCEDURE — 99214 OFFICE O/P EST MOD 30 MIN: CPT | Performed by: STUDENT IN AN ORGANIZED HEALTH CARE EDUCATION/TRAINING PROGRAM

## 2024-12-03 PROCEDURE — 36415 COLL VENOUS BLD VENIPUNCTURE: CPT | Performed by: PATHOLOGY

## 2024-12-03 PROCEDURE — 99213 OFFICE O/P EST LOW 20 MIN: CPT | Performed by: STUDENT IN AN ORGANIZED HEALTH CARE EDUCATION/TRAINING PROGRAM

## 2024-12-03 ASSESSMENT — PAIN SCALES - GENERAL: PAINLEVEL_OUTOF10: NO PAIN (0)

## 2024-12-03 NOTE — PATIENT INSTRUCTIONS
Continue Breztri twice daily, rinse your mouth out after use. Continue as needed albuterol for worsening shortness of breath or 5-10 minutes prior to activity. We will refer you to pulmonary rehab at [facility]. I will see you back in 3 months.

## 2024-12-03 NOTE — LETTER
12/3/2024      Danita Zambrano  [address]  [place] [address]      Dear Colleague,    Thank you for referring your patient, Danita Zambrano, to the [facility] [place]. Please see a copy of my visit note below.    Pulmonary Clinic Note    Date of Service: 12/3/2024     Chief Complaint   Patient presents with     RECHECK     Return visit        A/P:  59F being seen for follow up COPD and exertional hypoxemia. Two recent exacerbations. She has had slow decline since last visit.     - referral to pulmonary rehab at [facility]  - continue LTP-VIPR-SBSJ (Breztri) twice daily, rinse mouth out after use  - continue prn albuterol and duonebs  - will consider chronic azithromycin vs roflumilast vs dupilumab at next visit pending clinical course  - check CBC w/ diff  - OK to substitute medications based on formulary     History:  59F being seen for follow up COPD and exertional hypoxemia. Last seen 6/2024. She is prescribed NIX-DOTG-ACVR (Breztri), prn duonebs and prn albuterol. She was given azithromycin and prednisone taper 9/2024 and 11/2024. She still is not feeling well. Feeling lethargic. No SOB at rest. MARI minimal to moderate activity. No chest pain or tightness. No wheezing, only if experiencing exacerbation. More cough than she used to, but not too bad, productive of white sputum. No nocturnal cough. No orthopnea or PND. No LE edema. Did have a fever prior to receiving ABx in November. No night sweats. No wt loss. Using Breztri, can tell when she forgets to use it. Using albuterol prior to activity, it does help. Not using nebulizer. Using 3L O2, doesn't use much at home, but does wear w/ activity, does use it when she leaves the house. Has a watch that checks SpO2, unsure how accurate it is, but typically in the 90s.     Smoking: quit 3/2023, ~50 pack year history   Bird exposure: no             Animal exposure: no        Inhalation exposure: no    "                   10 point review of systems negative, aside from that mentioned in HPI.    /85   Pulse 85   Ht 1.626 m (5' 4\")   LMP  (LMP Unknown)   SpO2 91%   BMI 40.34 kg/m    Gen: well-appearing  HEENT: Mallampati IV  Card: RRR  Pulm: diffusely diminished   Abd: soft  MSK: no edema, no acute joint abnormality   Skin: no obvious rash  Psych: normal affect  Neuro: alert and oriented     Labs:  Personally reviewed    Imaging/Studies: Personally reviewed  CT Chest (2023) - mild emphysema, RUL granuloma  CXR (2023) - no acute abnormality, hyperinflation   6MWT (3/2023) - resting hypoxemia, reduced distance, significant desaturation w/o hypoxemia on 4L  PFTs (3/2023) - moderate obstruction, no significant BD response, mild diffusion defect   CT Chest (3/2023) - increased opacities lower lobes, decreased opacities RML and lingula     TTE (2024) - EF 60-65%, grade I diastolic dysfunction    Past Medical History:   Diagnosis Date     COPD (chronic obstructive pulmonary disease) (H)      PSH: reviewed and non-contributory  FHx: reviewed and non-contributory   Social History     Socioeconomic History     Marital status: Single     Spouse name: Not on file     Number of children: Not on file     Years of education: Not on file     Highest education level: Not on file   Occupational History     Not on file   Tobacco Use     Smoking status: Former     Current packs/day: 0.00     Average packs/day: 1 pack/day for 39.0 years (39.0 ttl pk-yrs)     Types: Cigarettes     Start date: 1984     Quit date: 2023     Years since quittin.7     Passive exposure: Never     Smokeless tobacco: Never     Tobacco comments:     2023 counseling done, using patch while admitted, Chantix at home, took workbook   Vaping Use     Vaping status: Never Used   Substance and Sexual Activity     Alcohol use: Not on file     Drug use: Not on file     Sexual activity: Not on file   Other Topics Concern     Not on file "   Social History Narrative     Not on file     Social Drivers of Health     Financial Resource Strain: Not on file   Food Insecurity: Not on file   Transportation Needs: Not on file   Physical Activity: Not on file   Stress: Not on file   Social Connections: Not on file   Interpersonal Safety: Not on file   Housing Stability: Not on file       35 minutes spent reviewing chart, reviewing test results, talking with and examining patient, formulating plan, and documentation on the day of the encounter.    Abelardo Coelho MD  Pulmonary and Critical Care Medicine  [facility]       Again, thank you for allowing me to participate in the care of your patient.        Sincerely,        Abelrado Coelho MD

## 2024-12-03 NOTE — NURSING NOTE
Chief Complaint   Patient presents with    RECHECK     Return visit     Medications reviewed and vital signs taken.   Jonathan Yee CMA

## 2024-12-10 ENCOUNTER — MYC MEDICAL ADVICE (OUTPATIENT)
Dept: PULMONOLOGY | Facility: CLINIC | Age: 59
End: 2024-12-10
Payer: COMMERCIAL

## 2025-01-08 ENCOUNTER — MYC MEDICAL ADVICE (OUTPATIENT)
Dept: PULMONOLOGY | Facility: CLINIC | Age: 60
End: 2025-01-08
Payer: COMMERCIAL

## 2025-01-08 NOTE — TELEPHONE ENCOUNTER
Writer spoke with patient letting her know we cannot keep on writing letters to account for every snow scenario that she should miss work for. Writer asked patient to have HR rep call us if they have any questions. Working from home 3 days per week should cover for any snow scenario.

## 2025-01-22 ENCOUNTER — DOCUMENTATION ONLY (OUTPATIENT)
Dept: PULMONOLOGY | Facility: CLINIC | Age: 60
End: 2025-01-22
Payer: COMMERCIAL

## 2025-01-22 DIAGNOSIS — J44.9 CHRONIC OBSTRUCTIVE PULMONARY DISEASE, UNSPECIFIED COPD TYPE (H): Primary | ICD-10-CM

## 2025-01-29 DIAGNOSIS — J44.9 CHRONIC OBSTRUCTIVE PULMONARY DISEASE, UNSPECIFIED COPD TYPE (H): ICD-10-CM

## 2025-01-29 RX ORDER — BUDESONIDE, GLYCOPYRROLATE, AND FORMOTEROL FUMARATE 160; 9; 4.8 UG/1; UG/1; UG/1
2 AEROSOL, METERED RESPIRATORY (INHALATION) 2 TIMES DAILY
Qty: 10.7 G | Refills: 5 | Status: SHIPPED | OUTPATIENT
Start: 2025-01-29

## 2025-02-26 ENCOUNTER — LAB (OUTPATIENT)
Dept: LAB | Facility: CLINIC | Age: 60
End: 2025-02-26
Payer: COMMERCIAL

## 2025-02-26 ENCOUNTER — OFFICE VISIT (OUTPATIENT)
Dept: PULMONOLOGY | Facility: CLINIC | Age: 60
End: 2025-02-26
Attending: INTERNAL MEDICINE
Payer: COMMERCIAL

## 2025-02-26 VITALS
DIASTOLIC BLOOD PRESSURE: 61 MMHG | BODY MASS INDEX: 45.49 KG/M2 | WEIGHT: 265 LBS | HEART RATE: 77 BPM | SYSTOLIC BLOOD PRESSURE: 96 MMHG | OXYGEN SATURATION: 99 %

## 2025-02-26 DIAGNOSIS — J44.1 COPD EXACERBATION (H): ICD-10-CM

## 2025-02-26 DIAGNOSIS — J44.1 COPD EXACERBATION (H): Primary | ICD-10-CM

## 2025-02-26 DIAGNOSIS — J96.11 CHRONIC HYPOXIC RESPIRATORY FAILURE (H): ICD-10-CM

## 2025-02-26 LAB
6 MIN WALK (FT): 615 FT
6 MIN WALK (M): 187 M
ALLEN'S TEST: YES
BASE EXCESS BLDA CALC-SCNC: 6.5 MMOL/L (ref -3–3)
HCO3 BLD-SCNC: 34 MMOL/L (ref 21–28)
NT-PROBNP SERPL-MCNC: 170 PG/ML (ref 0–900)
O2/TOTAL GAS SETTING VFR VENT: 36 %
OXYHGB MFR BLDA: 93 % (ref 92–100)
PCO2 BLD: 59 MM HG (ref 35–45)
PH BLD: 7.37 [PH] (ref 7.35–7.45)
PO2 BLD: 74 MM HG (ref 80–105)
SAO2 % BLDA: 94.5 % (ref 96–97)

## 2025-02-26 PROCEDURE — 36415 COLL VENOUS BLD VENIPUNCTURE: CPT | Performed by: PATHOLOGY

## 2025-02-26 PROCEDURE — 99213 OFFICE O/P EST LOW 20 MIN: CPT | Performed by: INTERNAL MEDICINE

## 2025-02-26 PROCEDURE — 3074F SYST BP LT 130 MM HG: CPT | Performed by: INTERNAL MEDICINE

## 2025-02-26 PROCEDURE — 83880 ASSAY OF NATRIURETIC PEPTIDE: CPT | Performed by: PATHOLOGY

## 2025-02-26 PROCEDURE — 1126F AMNT PAIN NOTED NONE PRSNT: CPT | Performed by: INTERNAL MEDICINE

## 2025-02-26 PROCEDURE — 99215 OFFICE O/P EST HI 40 MIN: CPT | Performed by: INTERNAL MEDICINE

## 2025-02-26 PROCEDURE — 82805 BLOOD GASES W/O2 SATURATION: CPT | Performed by: PATHOLOGY

## 2025-02-26 PROCEDURE — 3078F DIAST BP <80 MM HG: CPT | Performed by: INTERNAL MEDICINE

## 2025-02-26 RX ORDER — PREDNISONE 20 MG/1
40 TABLET ORAL DAILY
Qty: 10 TABLET | Refills: 0 | Status: SHIPPED | OUTPATIENT
Start: 2025-02-26 | End: 2025-03-03

## 2025-02-26 RX ORDER — DESVENLAFAXINE 100 MG/1
100 TABLET, EXTENDED RELEASE ORAL DAILY
COMMUNITY

## 2025-02-26 ASSESSMENT — ENCOUNTER SYMPTOMS
WHEEZING: 0
SHORTNESS OF BREATH: 1
COUGH: 0

## 2025-02-26 ASSESSMENT — PAIN SCALES - GENERAL: PAINLEVEL_OUTOF10: NO PAIN (0)

## 2025-02-26 NOTE — NURSING NOTE
Chief Complaint   Patient presents with    Follow Up     Return Pulmonary        Vitals were taken, medications reconciled.    Malcom Nava, Clinic Assistant   2:52 PM

## 2025-02-26 NOTE — LETTER
2/26/2025      Danita Zambrano  [address]  [facility] [address]      Dear Colleague,    Thank you for referring your patient, Danita Zambrano, to the [facility]. Please see a copy of my visit note below.      [facility]  [address] [facility] [address]  Phone: [phone]  Fax: [phone]          Pulmonology Clinic Follow up Visit       Danita Zambrano MRN# [id number]   YOB: 1965 Age: 59 year old     Date of Visit: 2/26/2025    Reason for Visit: COPD and chronic hypoxia with acute exacerbation          Assessment and Plan:     ## COPD, severe with exacerbation  ## Emphysema, mild  ## Chronic hypoxic respiratory failure  ## Severe diffusion defect    She has a history of COPD treated with Breztri and is currently working with pulmonary rehab.  She reports that over the past month her oxygen requirements have increased.  At baseline she uses 3 L when outside the house but does not need any oxygen at home.  She reports that due to poor circulation finger pulse oximeter's do not work so she determines her oxygen requirements based on how she feels.  Over the past month she has needed her oxygen continuously, even when at home at rest which is a significant change for her.  With exertion and pulmonary rehab she has needed 5 to 6 L.  She turned her portable concentrator up to 4 L but feels that this has not been enough.    She is concerned that at a higher setting her portable concentrator may not provide enough oxygen for her to work.    Despite her increased oxygen requirements, she has had no wheezing, cough, or mucus production.  She denies fevers or malaise.    Of note her weight is up 30 pounds since the last measured weight we have which was July 2023.    Her pCO2 has been significantly elevated on all past measurements.    The etiology of her  worsening is not clear.  If this were a true COPD exacerbation I would expect coughing and wheezing.  She does not have a history of heart disease and no edema on exam.  She does have significant weight gain over the past several months, though this has not been consistently tracked so I do not know when this weight gain occurred, but she may have developed ADÁN/OHS.  She does have a diffusion defect out of proportion with her emphysema possibly suggesting pulmonary hypertension.  No previous echocardiograms.      -Repeat PFTs and 6-minute walk  -ABG to assess CO2  -BNP  -Encouraged to turn her portable oxygen up to 5 L and see how she feels at that setting.  She may be able to use this at work if she can plug it in while sitting at her desk  -Continue Breztri and as needed albuterol  - trial of prednisone to see if this helps  -Repeat CT  -Could also consider echocardiogram to assess for pulmonary hypertension  -Provided with a letter excusing her from work the rest of this week and next week      ADDENDUM 3/4/2025  BNP within normal limits  ABG with pO2 of 74 and pCO2 of 59      Oxygen Documentation  I certify that this patient, Danita Zambrano has been under my care (or a nurse practitioner or physican's assistant working with me). This is the face-to-face encounter for oxygen medical necessity.      At the time of this encounter, I have reviewed the qualifying testing and have determined that supplemental oxygen is reasonable and necessary and is expected to improve the patient's condition in a home setting.         Patient has continued oxygen desaturation due to COPD J44.9.    If portability is ordered, is the patient mobile within the home? yes    Was this visit performed as a telehealth visit: No          Return to clinic: As previously scheduled with Dr. Coelho      I personally spent 47 minutes on the date of the encounter doing chart review, history and exam, documentation and further activities per the  note.      Itz Del Valle M.D.  Pulmonary & Critical Care  Pager: Click Here to page            History of Present Illness / Interval History:     Danita Zambrano is a 59 year old female with H/O COPD and exertional hypoxia as well as frequent exacerbations presents for urgent evaluation in the setting of active exacerbation.    Last seen: 12/3/2024 by Dr. Coelho    She was he follows with Dr. Coelho but is being seen by me as an urgent add-on.    She feels that over the past month her oxygen needs have increased substantially.  She did have an exposure to H1 N1 at the beginning the month, was treated with a course of prednisone that she thinks temporarily improved her oxygen requirements, however a few days later she went back to needing more oxygen than baseline.  Throughout this she has not had a change to her baseline cough or wheezing.    She generally uses oxygen at 3 L only when outside the house.  She reports that she has poor circulation in her fingers so finger sat probe never really picks up so she determines her oxygen requirements based on how she feels.  Beginning about a month ago she has had to start using her oxygen at all times, but even 3 L is not sufficient.  She feels breathless and lightheaded.  She has turned her concentrator up to 4 L which seems to have helped somewhat but then the battery does not last very long.    She is currently working with pulmonary rehab and has been using 5 to 6 L with exertion.              Review of Systems:     Review of Systems   Respiratory:  Positive for shortness of breath. Negative for cough and wheezing.             Physical Examination:     BP 96/61 (BP Location: Right arm, Patient Position: Chair, Cuff Size: Adult Large)   Pulse 77   Wt 120.2 kg (265 lb)   LMP  (LMP Unknown)   SpO2 99%   BMI 45.49 kg/m      Physical Exam  Vitals and nursing note reviewed.   Constitutional:       Appearance: Normal appearance. She is obese.   Cardiovascular:      Rate  and Rhythm: Normal rate and regular rhythm.   Pulmonary:      Effort: Pulmonary effort is normal.      Breath sounds: Normal breath sounds.   Musculoskeletal:      Right lower leg: No edema.      Left lower leg: No edema.   Neurological:      Mental Status: She is alert.       Fingernails without clubbing        Data:     PFT 2/26/2025      The FVC, FEV1, and FEV1/FVC ratio are reduced.  The RV is elevated, the TLC is within normal limits, the RV/TLC ratio was elevated.  The diffusion capacity is reduced.    IMPRESSION:  Severe obstruction with air trapping, and severe diffusion defect.      6 MIN walk 2/26/25  Walk distance reduced with hypoxia on room air at rest requiring 8 L supplemental oxygen on exertion.    PFT: 3/15/2023    Moderate obstruction without bronchodilator response, mild diffusion defect not corrected for hemoglobin        CT chest December 2024  Mosaic attenuation, mild emphysema          All studies listed here were independently reviewed and interpreted by me today.         Medications:     Current Outpatient Medications   Medication Sig Dispense Refill     albuterol (PROAIR HFA/PROVENTIL HFA/VENTOLIN HFA) 108 (90 Base) MCG/ACT inhaler Inhale 2 puffs into the lungs every 6 hours as needed for shortness of breath, wheezing or cough 18 g 3     albuterol (PROAIR HFA/PROVENTIL HFA/VENTOLIN HFA) 108 (90 Base) MCG/ACT inhaler Inhale 2 puffs into the lungs every 4 hours as needed for shortness of breath or wheezing       amitriptyline (ELAVIL) 10 MG tablet Take 10 mg by mouth At Bedtime       aspirin 81 MG EC tablet Take 81 mg by mouth daily       atorvastatin (LIPITOR) 20 MG tablet Take 20 mg by mouth daily       azithromycin (ZITHROMAX) 250 MG tablet Take 2 tablets (500 mg) by mouth daily. 5 tablet 0     budeson-glycopyrrol-formoterol (BREZTRI AEROSPHERE) 160-9-4.8 MCG/ACT AERO inhaler Inhale 2 puffs into the lungs 2 times daily. 10.7 g 5     cetirizine (ZYRTEC) 10 MG tablet Take 10 mg by mouth  daily       hypromellose (ARTIFICIAL TEARS) 0.5 % SOLN ophthalmic solution Place 1 drop into both eyes daily.       ibuprofen (ADVIL/MOTRIN) 200 MG tablet Take 600 mg by mouth once as needed for pain       ipratropium - albuterol 0.5 mg/2.5 mg/3 mL (DUONEB) 0.5-2.5 (3) MG/3ML neb solution Take 1 vial (3 mLs) by nebulization every 4 hours as needed for shortness of breath, wheezing or cough 90 mL 3     lisinopril (ZESTRIL) 10 MG tablet Take 10 mg by mouth daily       magnesium 250 MG tablet Take 1 tablet by mouth daily       predniSONE (DELTASONE) 20 MG tablet Take 2 tablets (40 mg) by mouth daily 7 tablet 0     simvastatin (ZOCOR) 20 MG tablet Take 20 mg by mouth At Bedtime       tetrahydrozoline (VISINE) 0.05 % ophthalmic solution Place 1 drop into both eyes once as needed.       varenicline (CHANTIX) 1 MG tablet Take 1 mg by mouth daily.       venlafaxine (EFFEXOR-ER) 225 MG 24 hr tablet Take 225 mg by mouth daily       vitamin D3 (CHOLECALCIFEROL) 250 mcg (19913 units) capsule Take 1 capsule by mouth daily       zolpidem (AMBIEN) 5 MG tablet Take tablet by mouth 15 minutes prior to sleep, for Sleep Study 1 tablet 0     No current facility-administered medications for this visit.             The above note was dictated using voice recognition software and may include typographical errors. Please contact the author for any clarifications.        Again, thank you for allowing me to participate in the care of your patient.        Sincerely,        Itz Del Valle MD    Electronically signed

## 2025-02-26 NOTE — PROGRESS NOTES
[facility] [address] [address]  Phone: [phone]  Fax: [phone]          Pulmonology Clinic Follow up Visit       Danita Zambrano MRN# [id number]   YOB: 1965 Age: 59 year old     Date of Visit: 2/26/2025    Reason for Visit: COPD and chronic hypoxia with acute exacerbation          Assessment and Plan:     ## COPD, severe with exacerbation  ## Emphysema, mild  ## Chronic hypoxic respiratory failure  ## Severe diffusion defect    She has a history of COPD treated with Breztri and is currently working with pulmonary rehab.  She reports that over the past month her oxygen requirements have increased.  At baseline she uses 3 L when outside the house but does not need any oxygen at home.  She reports that due to poor circulation finger pulse oximeter's do not work so she determines her oxygen requirements based on how she feels.  Over the past month she has needed her oxygen continuously, even when at home at rest which is a significant change for her.  With exertion and pulmonary rehab she has needed 5 to 6 L.  She turned her portable concentrator up to 4 L but feels that this has not been enough.    She is concerned that at a higher setting her portable concentrator may not provide enough oxygen for her to work.    Despite her increased oxygen requirements, she has had no wheezing, cough, or mucus production.  She denies fevers or malaise.    Of note her weight is up 30 pounds since the last measured weight we have which was July 2023.    Her pCO2 has been significantly elevated on all past measurements.    The etiology of her worsening is not clear.  If this were a true COPD exacerbation I would expect coughing and wheezing.  She does not have a history of heart disease and no edema on exam.  She does have significant weight gain over the past several months, though this has not been consistently tracked so  I do not know when this weight gain occurred, but she may have developed ADÁN/OHS.  She does have a diffusion defect out of proportion with her emphysema possibly suggesting pulmonary hypertension.  No previous echocardiograms.      -Repeat PFTs and 6-minute walk  -ABG to assess CO2  -BNP  -Encouraged to turn her portable oxygen up to 5 L and see how she feels at that setting.  She may be able to use this at work if she can plug it in while sitting at her desk  -Continue Breztri and as needed albuterol  - trial of prednisone to see if this helps  -Repeat CT  -Could also consider echocardiogram to assess for pulmonary hypertension  -Provided with a letter excusing her from work the rest of this week and next week      ADDENDUM 3/4/2025  BNP within normal limits  ABG with pO2 of 74 and pCO2 of 59      Oxygen Documentation  I certify that this patient, Danita Zambrano has been under my care (or a nurse practitioner or physican's assistant working with me). This is the face-to-face encounter for oxygen medical necessity.      At the time of this encounter, I have reviewed the qualifying testing and have determined that supplemental oxygen is reasonable and necessary and is expected to improve the patient's condition in a home setting.         Patient has continued oxygen desaturation due to COPD J44.9.    If portability is ordered, is the patient mobile within the home? yes    Was this visit performed as a telehealth visit: No          Return to clinic: As previously scheduled with Dr. Coelho      I personally spent 47 minutes on the date of the encounter doing chart review, history and exam, documentation and further activities per the note.      Itz Del Valle M.D.  Pulmonary & Critical Care  Pager: Click Here to page            History of Present Illness / Interval History:     Danita Zambrano is a 59 year old female with H/O COPD and exertional hypoxia as well as frequent exacerbations presents for urgent evaluation  in the setting of active exacerbation.    Last seen: 12/3/2024 by Dr. Coelho    She was he follows with Dr. Coelho but is being seen by me as an urgent add-on.    She feels that over the past month her oxygen needs have increased substantially.  She did have an exposure to H1 N1 at the beginning the month, was treated with a course of prednisone that she thinks temporarily improved her oxygen requirements, however a few days later she went back to needing more oxygen than baseline.  Throughout this she has not had a change to her baseline cough or wheezing.    She generally uses oxygen at 3 L only when outside the house.  She reports that she has poor circulation in her fingers so finger sat probe never really picks up so she determines her oxygen requirements based on how she feels.  Beginning about a month ago she has had to start using her oxygen at all times, but even 3 L is not sufficient.  She feels breathless and lightheaded.  She has turned her concentrator up to 4 L which seems to have helped somewhat but then the battery does not last very long.    She is currently working with pulmonary rehab and has been using 5 to 6 L with exertion.              Review of Systems:     Review of Systems   Respiratory:  Positive for shortness of breath. Negative for cough and wheezing.             Physical Examination:     BP 96/61 (BP Location: Right arm, Patient Position: Chair, Cuff Size: Adult Large)   Pulse 77   Wt 120.2 kg (265 lb)   LMP  (LMP Unknown)   SpO2 99%   BMI 45.49 kg/m      Physical Exam  Vitals and nursing note reviewed.   Constitutional:       Appearance: Normal appearance. She is obese.   Cardiovascular:      Rate and Rhythm: Normal rate and regular rhythm.   Pulmonary:      Effort: Pulmonary effort is normal.      Breath sounds: Normal breath sounds.   Musculoskeletal:      Right lower leg: No edema.      Left lower leg: No edema.   Neurological:      Mental Status: She is alert.       Fingernails  without clubbing        Data:     PFT 2/26/2025      The FVC, FEV1, and FEV1/FVC ratio are reduced.  The RV is elevated, the TLC is within normal limits, the RV/TLC ratio was elevated.  The diffusion capacity is reduced.    IMPRESSION:  Severe obstruction with air trapping, and severe diffusion defect.      6 MIN walk 2/26/25  Walk distance reduced with hypoxia on room air at rest requiring 8 L supplemental oxygen on exertion.    PFT: 3/15/2023    Moderate obstruction without bronchodilator response, mild diffusion defect not corrected for hemoglobin        CT chest December 2024  Mosaic attenuation, mild emphysema          All studies listed here were independently reviewed and interpreted by me today.         Medications:     Current Outpatient Medications   Medication Sig Dispense Refill    albuterol (PROAIR HFA/PROVENTIL HFA/VENTOLIN HFA) 108 (90 Base) MCG/ACT inhaler Inhale 2 puffs into the lungs every 6 hours as needed for shortness of breath, wheezing or cough 18 g 3    albuterol (PROAIR HFA/PROVENTIL HFA/VENTOLIN HFA) 108 (90 Base) MCG/ACT inhaler Inhale 2 puffs into the lungs every 4 hours as needed for shortness of breath or wheezing      amitriptyline (ELAVIL) 10 MG tablet Take 10 mg by mouth At Bedtime      aspirin 81 MG EC tablet Take 81 mg by mouth daily      atorvastatin (LIPITOR) 20 MG tablet Take 20 mg by mouth daily      azithromycin (ZITHROMAX) 250 MG tablet Take 2 tablets (500 mg) by mouth daily. 5 tablet 0    budeson-glycopyrrol-formoterol (BREZTRI AEROSPHERE) 160-9-4.8 MCG/ACT AERO inhaler Inhale 2 puffs into the lungs 2 times daily. 10.7 g 5    cetirizine (ZYRTEC) 10 MG tablet Take 10 mg by mouth daily      hypromellose (ARTIFICIAL TEARS) 0.5 % SOLN ophthalmic solution Place 1 drop into both eyes daily.      ibuprofen (ADVIL/MOTRIN) 200 MG tablet Take 600 mg by mouth once as needed for pain      ipratropium - albuterol 0.5 mg/2.5 mg/3 mL (DUONEB) 0.5-2.5 (3) MG/3ML neb solution Take 1 vial (3  mLs) by nebulization every 4 hours as needed for shortness of breath, wheezing or cough 90 mL 3    lisinopril (ZESTRIL) 10 MG tablet Take 10 mg by mouth daily      magnesium 250 MG tablet Take 1 tablet by mouth daily      predniSONE (DELTASONE) 20 MG tablet Take 2 tablets (40 mg) by mouth daily 7 tablet 0    simvastatin (ZOCOR) 20 MG tablet Take 20 mg by mouth At Bedtime      tetrahydrozoline (VISINE) 0.05 % ophthalmic solution Place 1 drop into both eyes once as needed.      varenicline (CHANTIX) 1 MG tablet Take 1 mg by mouth daily.      venlafaxine (EFFEXOR-ER) 225 MG 24 hr tablet Take 225 mg by mouth daily      vitamin D3 (CHOLECALCIFEROL) 250 mcg (50296 units) capsule Take 1 capsule by mouth daily      zolpidem (AMBIEN) 5 MG tablet Take tablet by mouth 15 minutes prior to sleep, for Sleep Study 1 tablet 0     No current facility-administered medications for this visit.             The above note was dictated using voice recognition software and may include typographical errors. Please contact the author for any clarifications.

## 2025-02-26 NOTE — LETTER
[facility] [address] [address] [phone]  Dept: [phone]      2/26/2025    Re: Danita Zambrano      TO WHOM IT MAY CONCERN:    Danita Zambrano  was seen on 2/26/2025. She is currently having a respiratory exacerbation. 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     Regards,          Itz Del Valle MD      [facility]

## 2025-02-27 LAB
DLCOCOR-%PRED-PRE: 38 %
DLCOCOR-PRE: 7.67 ML/MIN/MMHG
DLCOUNC-%PRED-PRE: 39 %
DLCOUNC-PRE: 7.74 ML/MIN/MMHG
DLCOUNC-PRED: 19.69 ML/MIN/MMHG
ERV-%PRED-PRE: 33 %
ERV-PRE: 0.36 L
ERV-PRED: 1.08 L
EXPTIME-PRE: 6.01 SEC
FEF2575-%PRED-PRE: 12 %
FEF2575-PRE: 0.26 L/SEC
FEF2575-PRED: 2.12 L/SEC
FEFMAX-%PRED-PRE: 40 %
FEFMAX-PRE: 2.55 L/SEC
FEFMAX-PRED: 6.25 L/SEC
FEV1-%PRED-PRE: 25 %
FEV1-PRE: 0.59 L
FEV1FEV6-PRE: 54 %
FEV1FEV6-PRED: 81 %
FEV1FVC-PRE: 54 %
FEV1FVC-PRED: 80 %
FEV1SVC-PRE: 42 %
FEV1SVC-PRED: 71 %
FIFMAX-PRE: 2.29 L/SEC
FIO2-PRE: 36 %
FRCPLETH-%PRED-PRE: 160 %
FRCPLETH-PRE: 4.59 L
FRCPLETH-PRED: 2.86 L
FVC-%PRED-PRE: 38 %
FVC-PRE: 1.11 L
FVC-PRED: 2.89 L
IC-%PRED-PRE: 48 %
IC-PRE: 1.06 L
IC-PRED: 2.17 L
RVPLETH-%PRED-PRE: 220 %
RVPLETH-PRE: 4.23 L
RVPLETH-PRED: 1.92 L
TLCPLETH-%PRED-PRE: 114 %
TLCPLETH-PRE: 5.65 L
TLCPLETH-PRED: 4.94 L
VA-%PRED-PRE: 57 %
VA-PRE: 2.69 L
VC-%PRED-PRE: 43 %
VC-PRE: 1.42 L
VC-PRED: 3.23 L

## 2025-04-30 ENCOUNTER — OFFICE VISIT (OUTPATIENT)
Dept: PULMONOLOGY | Facility: CLINIC | Age: 60
End: 2025-04-30
Attending: INTERNAL MEDICINE
Payer: COMMERCIAL

## 2025-04-30 ENCOUNTER — ANCILLARY PROCEDURE (OUTPATIENT)
Dept: CT IMAGING | Facility: CLINIC | Age: 60
End: 2025-04-30
Attending: INTERNAL MEDICINE
Payer: COMMERCIAL

## 2025-04-30 VITALS
SYSTOLIC BLOOD PRESSURE: 101 MMHG | HEART RATE: 97 BPM | BODY MASS INDEX: 45.13 KG/M2 | WEIGHT: 262.9 LBS | OXYGEN SATURATION: 93 % | DIASTOLIC BLOOD PRESSURE: 64 MMHG

## 2025-04-30 DIAGNOSIS — J44.1 COPD EXACERBATION (H): Primary | ICD-10-CM

## 2025-04-30 DIAGNOSIS — J44.1 COPD EXACERBATION (H): ICD-10-CM

## 2025-04-30 PROCEDURE — 1126F AMNT PAIN NOTED NONE PRSNT: CPT | Performed by: INTERNAL MEDICINE

## 2025-04-30 PROCEDURE — 71250 CT THORAX DX C-: CPT | Performed by: RADIOLOGY

## 2025-04-30 PROCEDURE — 99213 OFFICE O/P EST LOW 20 MIN: CPT | Performed by: INTERNAL MEDICINE

## 2025-04-30 PROCEDURE — 99214 OFFICE O/P EST MOD 30 MIN: CPT | Performed by: INTERNAL MEDICINE

## 2025-04-30 PROCEDURE — 3074F SYST BP LT 130 MM HG: CPT | Performed by: INTERNAL MEDICINE

## 2025-04-30 PROCEDURE — 3078F DIAST BP <80 MM HG: CPT | Performed by: INTERNAL MEDICINE

## 2025-04-30 RX ORDER — MULTIVITAMIN WITH IRON
500 TABLET ORAL DAILY
COMMUNITY

## 2025-04-30 ASSESSMENT — PAIN SCALES - GENERAL: PAINLEVEL_OUTOF10: NO PAIN (0)

## 2025-04-30 NOTE — PROGRESS NOTES
[facility] [address] [address]  Phone: [phone]  Fax: [phone]          Pulmonology Clinic Follow up Visit       Danita Zambrano MRN# [id number]   YOB: 1965 Age: 60 year old     Date of Visit: 4/30/2025    Reason for Visit: Follow-up COPD after recent exacerbation          Assessment and Plan:     ## COPD, severe with persistent exacerbation  ## Emphysema, mild  ## Chronic hypoxic respiratory failure (5 L at rest to 10 with exertion)  ## Severe diffusion defect     She has a history of COPD treated with Breztri and is currently working with pulmonary rehab.  In October and February her oxygen requirements significantly increased.  Prior to that she had been using 3 L, but repeat 6-minute walk in February 2025 indicated that she needed 10 L.    That time she was using 3 L at baseline she uses 3 L when outside the house but does not need any oxygen at home.  She reports that due to poor circulation finger pulse oximeter's do not work so she determines her oxygen requirements based on how she feels.  Over the past month she has needed her oxygen continuously, even when at home at rest which is a significant change for her.  With exertion and pulmonary rehab she has needed 5 to 6 L.  She turned her portable concentrator up to 4 L but feels that this has not been enough.     Of note her weight is up 30 pounds since the last measured weight we have which was July 2023 currently working on weight loss     Her pCO2 has been significantly elevated on all past measurements.     The etiology of her worsening is not clear.  If this were a true COPD exacerbation I would expect coughing and wheezing.  She does not have a history of heart disease and no edema on exam and normal BNP.  She does have significant weight gain over the past several months, though this has not been consistently tracked so I do not know when  this weight gain occurred, but she may have developed ADÁN/OHS.    However, her response to prednisone suggests an inflammatory component to her respiratory issues    She does have a diffusion defect out of proportion with her emphysema possibly suggesting pulmonary hypertension.  No previous echocardiograms.  The mosaic attenuation is likely small airways disease, though small vessel disease is also in the differential     -Continue Breztri      -Begin mometasone 2 puffs twice daily for additional anti-inflammatory.  Instructed to use this for a month and then if not beneficial can discontinue  - Will consider echocardiogram if she does not continue to improve  -Overnight oximetry to screen for nocturnal issues (may not be accurate given that finger pulse oximeter's often do not work for her.  Of note, she has a medical bed that somehow reports her respiratory rate and she has an average rate of 20 with a low of 19 breaths/min)          Return to clinic: 3 months        Itz Del Valle M.D.  Pulmonary & Critical Care  Pager: Click Here to page          History of Present Illness / Interval History:     Danita Zambrano is a 60 year old female with H/O COPD and exertional hypoxia as well as frequent exacerbations presents for urgent evaluation in the setting of active exacerbation.     Last seen: 2/26/2025    - Attended pulmonary rehabilitation, which was beneficial.  - Noticed improvement with prednisone and pulmonary rehab, but progress has plateaued in the last 2-3 weeks.  - Engages in home exercises, including walking 10 minutes daily and pulmonary rehab exercises three times a week.  - Uses oxygen at home, set at 10 when walking and 5 at rest.  - Uses a ring pulse oximeter at home (finger pulse ox does not read well for her), stops activity when oxygen level drops to 88.  - Shortness of breath persists, with no significant wheezing or coughing unless expelling mucus.  - Allergies affect eyes, causing irritation  and sneezing, but do not impact breathing.  - Breathing affected by low pressure during storms and high humidity.  - Experienced a significant health episode starting 2-3 months ago, possibly related to influenza, leading to prolonged illness.  - No recent changes in environment or exposures.  - Continues to use Breztri with a spacer and albuterol before activities, though albuterol seems ineffective.  - Daughter has cystic fibrosis and receives care at the same facility.            Review of Systems:     ROS  Negative except as above         Physical Examination:     /64 (BP Location: Right arm, Patient Position: Chair, Cuff Size: Adult Large)   Pulse 97   Wt 119.3 kg (262 lb 14.4 oz)   LMP  (LMP Unknown)   SpO2 93%   BMI 45.13 kg/m      Physical Exam  Cardiac: Regular rate and rhythm, no additional sounds  Pulmonary: Good airflow without wheezes, rales, or rhonchi        Data:     CT chest 4/30/25  No pleural effusion or pneumothorax. No focal consolidation.  Peripheral linear atelectasis/scarring in the posterior right upper  lobe, medial right middle lobe, lingula, and anterolateral and  posterior left lower lobe. Resolution of the previously seen  peribronchial vascular opacities in the right upper lobe. Mild apical  predominant centrilobular and paraseptal emphysematous change. Mild  bilateral interlobular septal thickening and mosaic attenuation. No  suspicious new or enlarging pulmonary nodule. The central  tracheobronchial tree is patent. Adherent mucoid debris in the  trachea. No bronchial wall thickening or bronchiectasis.    1. No acute airspace disease.  2. Mild bilateral mosaic attenuation, which can be seen with small  vessel or small airways disease.  3. Mild bilateral interlobular septal thickening suggests a component  of pulmonary edema.  4. Mild apical predominant centrilobular and paraseptal emphysematous  change.            Medications:     Current Outpatient Medications   Medication  Sig Dispense Refill    albuterol (PROAIR HFA/PROVENTIL HFA/VENTOLIN HFA) 108 (90 Base) MCG/ACT inhaler Inhale 2 puffs into the lungs every 6 hours as needed for shortness of breath, wheezing or cough (Patient not taking: Reported on 2/26/2025) 18 g 3    albuterol (PROAIR HFA/PROVENTIL HFA/VENTOLIN HFA) 108 (90 Base) MCG/ACT inhaler Inhale 2 puffs into the lungs every 4 hours as needed for shortness of breath or wheezing (Patient not taking: Reported on 2/26/2025)      amitriptyline (ELAVIL) 10 MG tablet Take 10 mg by mouth At Bedtime      aspirin 81 MG EC tablet Take 81 mg by mouth daily      atorvastatin (LIPITOR) 20 MG tablet Take 20 mg by mouth daily      azithromycin (ZITHROMAX) 250 MG tablet Take 2 tablets (500 mg) by mouth daily. (Patient not taking: Reported on 2/26/2025) 5 tablet 0    budeson-glycopyrrol-formoterol (BREZTRI AEROSPHERE) 160-9-4.8 MCG/ACT AERO inhaler Inhale 2 puffs into the lungs 2 times daily. 10.7 g 5    cetirizine (ZYRTEC) 10 MG tablet Take 10 mg by mouth daily (Patient not taking: Reported on 2/26/2025)      desvenlafaxine (PRISTIQ) 100 MG 24 hr tablet Take 100 mg by mouth daily. (Patient not taking: Reported on 2/26/2025)      hypromellose (ARTIFICIAL TEARS) 0.5 % SOLN ophthalmic solution Place 1 drop into both eyes daily.      ibuprofen (ADVIL/MOTRIN) 200 MG tablet Take 600 mg by mouth once as needed for pain (Patient not taking: Reported on 2/26/2025)      ipratropium - albuterol 0.5 mg/2.5 mg/3 mL (DUONEB) 0.5-2.5 (3) MG/3ML neb solution Take 1 vial (3 mLs) by nebulization every 4 hours as needed for shortness of breath, wheezing or cough 90 mL 3    lisinopril (ZESTRIL) 10 MG tablet Take 10 mg by mouth daily      magnesium 250 MG tablet Take 1 tablet by mouth daily      predniSONE (DELTASONE) 20 MG tablet Take 2 tablets (40 mg) by mouth daily (Patient not taking: Reported on 2/26/2025) 7 tablet 0    simvastatin (ZOCOR) 20 MG tablet Take 20 mg by mouth At Bedtime (Patient not taking:  Reported on 2/26/2025)      tetrahydrozoline (VISINE) 0.05 % ophthalmic solution Place 1 drop into both eyes once as needed. (Patient not taking: Reported on 2/26/2025)      varenicline (CHANTIX) 1 MG tablet Take 1 mg by mouth daily. (Patient not taking: Reported on 2/26/2025)      venlafaxine (EFFEXOR-ER) 225 MG 24 hr tablet Take 225 mg by mouth daily      vitamin D3 (CHOLECALCIFEROL) 250 mcg (65655 units) capsule Take 1 capsule by mouth daily      zolpidem (AMBIEN) 5 MG tablet Take tablet by mouth 15 minutes prior to sleep, for Sleep Study (Patient not taking: Reported on 2/26/2025) 1 tablet 0     No current facility-administered medications for this visit.             The above note was dictated using voice recognition software and may include typographical errors. Please contact the author for any clarifications.

## 2025-04-30 NOTE — LETTER
4/30/2025      Danita Zambrano  [address]  [place] [address]      Dear Colleague,    Thank you for referring your patient, Danita Zambrano, to the [facility]. Please see a copy of my visit note below.      [facility]  [address] [facility] [address]  Phone: [phone]  Fax: [phone]          Pulmonology Clinic Follow up Visit       Danita Zambrano MRN# [id number]   YOB: 1965 Age: 60 year old     Date of Visit: 4/30/2025    Reason for Visit: Follow-up COPD after recent exacerbation          Assessment and Plan:     ## COPD, severe with persistent exacerbation  ## Emphysema, mild  ## Chronic hypoxic respiratory failure (5 L at rest to 10 with exertion)  ## Severe diffusion defect     She has a history of COPD treated with Breztri and is currently working with pulmonary rehab.  In October and February her oxygen requirements significantly increased.  Prior to that she had been using 3 L, but repeat 6-minute walk in February 2025 indicated that she needed 10 L.    That time she was using 3 L at baseline she uses 3 L when outside the house but does not need any oxygen at home.  She reports that due to poor circulation finger pulse oximeter's do not work so she determines her oxygen requirements based on how she feels.  Over the past month she has needed her oxygen continuously, even when at home at rest which is a significant change for her.  With exertion and pulmonary rehab she has needed 5 to 6 L.  She turned her portable concentrator up to 4 L but feels that this has not been enough.     Of note her weight is up 30 pounds since the last measured weight we have which was July 2023 currently working on weight loss     Her pCO2 has been significantly elevated on all past measurements.     The etiology of her worsening is not clear.  If this were a true  COPD exacerbation I would expect coughing and wheezing.  She does not have a history of heart disease and no edema on exam and normal BNP.  She does have significant weight gain over the past several months, though this has not been consistently tracked so I do not know when this weight gain occurred, but she may have developed ADÁN/OHS.    However, her response to prednisone suggests an inflammatory component to her respiratory issues    She does have a diffusion defect out of proportion with her emphysema possibly suggesting pulmonary hypertension.  No previous echocardiograms.  The mosaic attenuation is likely small airways disease, though small vessel disease is also in the differential     -Continue Breztri      -Begin mometasone 2 puffs twice daily for additional anti-inflammatory.  Instructed to use this for a month and then if not beneficial can discontinue  - Will consider echocardiogram if she does not continue to improve  -Overnight oximetry to screen for nocturnal issues (may not be accurate given that finger pulse oximeter's often do not work for her.  Of note, she has a medical bed that somehow reports her respiratory rate and she has an average rate of 20 with a low of 19 breaths/min)          Return to clinic: 3 months        Itz Del Valle M.D.  Pulmonary & Critical Care  Pager: Click Here to page          History of Present Illness / Interval History:     Danita Zambrano is a 60 year old female with H/O COPD and exertional hypoxia as well as frequent exacerbations presents for urgent evaluation in the setting of active exacerbation.     Last seen: 2/26/2025    - Attended pulmonary rehabilitation, which was beneficial.  - Noticed improvement with prednisone and pulmonary rehab, but progress has plateaued in the last 2-3 weeks.  - Engages in home exercises, including walking 10 minutes daily and pulmonary rehab exercises three times a week.  - Uses oxygen at home, set at 10 when walking and 5 at  rest.  - Uses a ring pulse oximeter at home (finger pulse ox does not read well for her), stops activity when oxygen level drops to 88.  - Shortness of breath persists, with no significant wheezing or coughing unless expelling mucus.  - Allergies affect eyes, causing irritation and sneezing, but do not impact breathing.  - Breathing affected by low pressure during storms and high humidity.  - Experienced a significant health episode starting 2-3 months ago, possibly related to influenza, leading to prolonged illness.  - No recent changes in environment or exposures.  - Continues to use Breztri with a spacer and albuterol before activities, though albuterol seems ineffective.  - Daughter has cystic fibrosis and receives care at the same facility.            Review of Systems:     ROS  Negative except as above         Physical Examination:     /64 (BP Location: Right arm, Patient Position: Chair, Cuff Size: Adult Large)   Pulse 97   Wt 119.3 kg (262 lb 14.4 oz)   LMP  (LMP Unknown)   SpO2 93%   BMI 45.13 kg/m      Physical Exam  Cardiac: Regular rate and rhythm, no additional sounds  Pulmonary: Good airflow without wheezes, rales, or rhonchi        Data:     CT chest 4/30/25  No pleural effusion or pneumothorax. No focal consolidation.  Peripheral linear atelectasis/scarring in the posterior right upper  lobe, medial right middle lobe, lingula, and anterolateral and  posterior left lower lobe. Resolution of the previously seen  peribronchial vascular opacities in the right upper lobe. Mild apical  predominant centrilobular and paraseptal emphysematous change. Mild  bilateral interlobular septal thickening and mosaic attenuation. No  suspicious new or enlarging pulmonary nodule. The central  tracheobronchial tree is patent. Adherent mucoid debris in the  trachea. No bronchial wall thickening or bronchiectasis.    1. No acute airspace disease.  2. Mild bilateral mosaic attenuation, which can be seen with  small  vessel or small airways disease.  3. Mild bilateral interlobular septal thickening suggests a component  of pulmonary edema.  4. Mild apical predominant centrilobular and paraseptal emphysematous  change.            Medications:     Current Outpatient Medications   Medication Sig Dispense Refill     albuterol (PROAIR HFA/PROVENTIL HFA/VENTOLIN HFA) 108 (90 Base) MCG/ACT inhaler Inhale 2 puffs into the lungs every 6 hours as needed for shortness of breath, wheezing or cough (Patient not taking: Reported on 2/26/2025) 18 g 3     albuterol (PROAIR HFA/PROVENTIL HFA/VENTOLIN HFA) 108 (90 Base) MCG/ACT inhaler Inhale 2 puffs into the lungs every 4 hours as needed for shortness of breath or wheezing (Patient not taking: Reported on 2/26/2025)       amitriptyline (ELAVIL) 10 MG tablet Take 10 mg by mouth At Bedtime       aspirin 81 MG EC tablet Take 81 mg by mouth daily       atorvastatin (LIPITOR) 20 MG tablet Take 20 mg by mouth daily       azithromycin (ZITHROMAX) 250 MG tablet Take 2 tablets (500 mg) by mouth daily. (Patient not taking: Reported on 2/26/2025) 5 tablet 0     budeson-glycopyrrol-formoterol (BREZTRI AEROSPHERE) 160-9-4.8 MCG/ACT AERO inhaler Inhale 2 puffs into the lungs 2 times daily. 10.7 g 5     cetirizine (ZYRTEC) 10 MG tablet Take 10 mg by mouth daily (Patient not taking: Reported on 2/26/2025)       desvenlafaxine (PRISTIQ) 100 MG 24 hr tablet Take 100 mg by mouth daily. (Patient not taking: Reported on 2/26/2025)       hypromellose (ARTIFICIAL TEARS) 0.5 % SOLN ophthalmic solution Place 1 drop into both eyes daily.       ibuprofen (ADVIL/MOTRIN) 200 MG tablet Take 600 mg by mouth once as needed for pain (Patient not taking: Reported on 2/26/2025)       ipratropium - albuterol 0.5 mg/2.5 mg/3 mL (DUONEB) 0.5-2.5 (3) MG/3ML neb solution Take 1 vial (3 mLs) by nebulization every 4 hours as needed for shortness of breath, wheezing or cough 90 mL 3     lisinopril (ZESTRIL) 10 MG tablet Take 10 mg  by mouth daily       magnesium 250 MG tablet Take 1 tablet by mouth daily       predniSONE (DELTASONE) 20 MG tablet Take 2 tablets (40 mg) by mouth daily (Patient not taking: Reported on 2/26/2025) 7 tablet 0     simvastatin (ZOCOR) 20 MG tablet Take 20 mg by mouth At Bedtime (Patient not taking: Reported on 2/26/2025)       tetrahydrozoline (VISINE) 0.05 % ophthalmic solution Place 1 drop into both eyes once as needed. (Patient not taking: Reported on 2/26/2025)       varenicline (CHANTIX) 1 MG tablet Take 1 mg by mouth daily. (Patient not taking: Reported on 2/26/2025)       venlafaxine (EFFEXOR-ER) 225 MG 24 hr tablet Take 225 mg by mouth daily       vitamin D3 (CHOLECALCIFEROL) 250 mcg (66962 units) capsule Take 1 capsule by mouth daily       zolpidem (AMBIEN) 5 MG tablet Take tablet by mouth 15 minutes prior to sleep, for Sleep Study (Patient not taking: Reported on 2/26/2025) 1 tablet 0     No current facility-administered medications for this visit.             The above note was dictated using voice recognition software and may include typographical errors. Please contact the author for any clarifications.        Again, thank you for allowing me to participate in the care of your patient.        Sincerely,        Itz Del Valle MD    Electronically signed

## 2025-04-30 NOTE — NURSING NOTE
Chief Complaint   Patient presents with    Follow Up     Return Pulmonary        Vitals were taken, medications reconciled.    Malcom Nava, Clinic Assistant   7:27 AM

## 2025-05-02 ENCOUNTER — TRANSFERRED RECORDS (OUTPATIENT)
Dept: HEALTH INFORMATION MANAGEMENT | Facility: CLINIC | Age: 60
End: 2025-05-02
Payer: COMMERCIAL

## 2025-05-05 ENCOUNTER — MYC MEDICAL ADVICE (OUTPATIENT)
Dept: PULMONOLOGY | Facility: CLINIC | Age: 60
End: 2025-05-05
Payer: COMMERCIAL

## 2025-05-08 NOTE — TELEPHONE ENCOUNTER
Writer called [facility] to request MORRO results be FAXed to our office.

## 2025-05-12 ENCOUNTER — PATIENT OUTREACH (OUTPATIENT)
Dept: CARE COORDINATION | Facility: CLINIC | Age: 60
End: 2025-05-12
Payer: COMMERCIAL

## 2025-06-08 ENCOUNTER — HEALTH MAINTENANCE LETTER (OUTPATIENT)
Age: 60
End: 2025-06-08

## 2025-08-06 ENCOUNTER — OFFICE VISIT (OUTPATIENT)
Dept: PULMONOLOGY | Facility: CLINIC | Age: 60
End: 2025-08-06
Attending: INTERNAL MEDICINE
Payer: COMMERCIAL

## 2025-08-06 VITALS
WEIGHT: 262.9 LBS | DIASTOLIC BLOOD PRESSURE: 59 MMHG | BODY MASS INDEX: 44.88 KG/M2 | OXYGEN SATURATION: 90 % | SYSTOLIC BLOOD PRESSURE: 92 MMHG | HEIGHT: 64 IN | HEART RATE: 102 BPM

## 2025-08-06 DIAGNOSIS — J44.9 CHRONIC OBSTRUCTIVE PULMONARY DISEASE, UNSPECIFIED COPD TYPE (H): ICD-10-CM

## 2025-08-06 DIAGNOSIS — Z14.1 CYSTIC FIBROSIS CARRIER: Primary | ICD-10-CM

## 2025-08-06 PROCEDURE — 3074F SYST BP LT 130 MM HG: CPT | Performed by: INTERNAL MEDICINE

## 2025-08-06 PROCEDURE — 3078F DIAST BP <80 MM HG: CPT | Performed by: INTERNAL MEDICINE

## 2025-08-06 PROCEDURE — 1126F AMNT PAIN NOTED NONE PRSNT: CPT | Performed by: INTERNAL MEDICINE

## 2025-08-06 PROCEDURE — 99213 OFFICE O/P EST LOW 20 MIN: CPT | Performed by: INTERNAL MEDICINE

## 2025-08-06 PROCEDURE — 99215 OFFICE O/P EST HI 40 MIN: CPT | Performed by: INTERNAL MEDICINE

## 2025-08-06 RX ORDER — TOPIRAMATE 25 MG/1
25 TABLET, FILM COATED ORAL 2 TIMES DAILY
COMMUNITY

## 2025-08-06 RX ORDER — BUDESONIDE, GLYCOPYRROLATE, AND FORMOTEROL FUMARATE 160; 9; 4.8 UG/1; UG/1; UG/1
2 AEROSOL, METERED RESPIRATORY (INHALATION) 2 TIMES DAILY
Qty: 10.7 G | Refills: 11 | Status: SHIPPED | OUTPATIENT
Start: 2025-08-06

## 2025-08-06 RX ORDER — UREA 10 %
45 LOTION (ML) TOPICAL DAILY
COMMUNITY

## 2025-08-06 ASSESSMENT — ENCOUNTER SYMPTOMS
SHORTNESS OF BREATH: 1
WHEEZING: 0
COUGH: 1

## 2025-08-06 ASSESSMENT — PAIN SCALES - GENERAL: PAINLEVEL_OUTOF10: NO PAIN (0)

## 2025-08-15 ASSESSMENT — SLEEP AND FATIGUE QUESTIONNAIRES
HOW LIKELY ARE YOU TO NOD OFF OR FALL ASLEEP WHILE SITTING INACTIVE IN A PUBLIC PLACE: WOULD NEVER DOZE
HOW LIKELY ARE YOU TO NOD OFF OR FALL ASLEEP WHILE SITTING AND TALKING TO SOMEONE: WOULD NEVER DOZE
HOW LIKELY ARE YOU TO NOD OFF OR FALL ASLEEP WHILE SITTING QUIETLY AFTER LUNCH WITHOUT ALCOHOL: SLIGHT CHANCE OF DOZING
HOW LIKELY ARE YOU TO NOD OFF OR FALL ASLEEP IN A CAR, WHILE STOPPED FOR A FEW MINUTES IN TRAFFIC: WOULD NEVER DOZE
HOW LIKELY ARE YOU TO NOD OFF OR FALL ASLEEP WHILE SITTING AND READING: SLIGHT CHANCE OF DOZING
HOW LIKELY ARE YOU TO NOD OFF OR FALL ASLEEP WHILE WATCHING TV: SLIGHT CHANCE OF DOZING
HOW LIKELY ARE YOU TO NOD OFF OR FALL ASLEEP WHILE LYING DOWN TO REST IN THE AFTERNOON WHEN CIRCUMSTANCES PERMIT: MODERATE CHANCE OF DOZING
HOW LIKELY ARE YOU TO NOD OFF OR FALL ASLEEP WHEN YOU ARE A PASSENGER IN A CAR FOR AN HOUR WITHOUT A BREAK: MODERATE CHANCE OF DOZING

## 2025-08-17 PROBLEM — K57.30 DIVERTICULOSIS OF SIGMOID COLON: Chronic | Status: ACTIVE | Noted: 2017-04-07

## 2025-08-17 PROBLEM — I10 HTN (HYPERTENSION): Chronic | Status: ACTIVE | Noted: 2019-02-12

## 2025-08-17 PROBLEM — E78.5 HLD (HYPERLIPIDEMIA): Chronic | Status: ACTIVE | Noted: 2019-02-12

## 2025-08-17 PROBLEM — J96.11 CHRONIC RESPIRATORY FAILURE WITH HYPOXIA (H): Chronic | Status: ACTIVE | Noted: 2025-08-17

## 2025-08-17 PROBLEM — D22.9 MULTIPLE NEVI: Status: ACTIVE | Noted: 2018-02-22

## 2025-08-17 PROBLEM — J44.9 CHRONIC OBSTRUCTIVE PULMONARY DISEASE, UNSPECIFIED COPD TYPE (H): Chronic | Status: ACTIVE | Noted: 2023-04-13

## 2025-08-17 PROBLEM — G43.009 MIGRAINE WITHOUT AURA AND WITHOUT STATUS MIGRAINOSUS, NOT INTRACTABLE: Chronic | Status: ACTIVE | Noted: 2023-04-13

## 2025-08-17 PROBLEM — J96.12 CHRONIC RESPIRATORY FAILURE WITH HYPERCAPNIA (H): Status: ACTIVE | Noted: 2025-08-17

## 2025-08-17 PROBLEM — J96.12 CHRONIC RESPIRATORY FAILURE WITH HYPERCAPNIA (H): Chronic | Status: ACTIVE | Noted: 2025-08-17

## 2025-08-17 PROBLEM — R73.03 PREDIABETES: Status: ACTIVE | Noted: 2019-02-15

## 2025-08-17 PROBLEM — J44.1 COPD EXACERBATION (H): Status: RESOLVED | Noted: 2023-02-23 | Resolved: 2025-08-17

## 2025-08-18 ENCOUNTER — VIRTUAL VISIT (OUTPATIENT)
Dept: SLEEP MEDICINE | Facility: CLINIC | Age: 60
End: 2025-08-18
Payer: COMMERCIAL

## 2025-08-18 ENCOUNTER — TELEPHONE (OUTPATIENT)
Dept: PULMONOLOGY | Facility: CLINIC | Age: 60
End: 2025-08-18

## 2025-08-18 VITALS — WEIGHT: 256 LBS | BODY MASS INDEX: 43.71 KG/M2 | HEIGHT: 64 IN

## 2025-08-18 DIAGNOSIS — J96.12 CHRONIC RESPIRATORY FAILURE WITH HYPERCAPNIA (H): Chronic | ICD-10-CM

## 2025-08-18 DIAGNOSIS — J44.9 CHRONIC OBSTRUCTIVE PULMONARY DISEASE, UNSPECIFIED COPD TYPE (H): Primary | Chronic | ICD-10-CM

## 2025-08-18 DIAGNOSIS — E66.813 CLASS 3 SEVERE OBESITY DUE TO EXCESS CALORIES WITH SERIOUS COMORBIDITY AND BODY MASS INDEX (BMI) OF 40.0 TO 44.9 IN ADULT (H): Chronic | ICD-10-CM

## 2025-08-18 DIAGNOSIS — J96.11 CHRONIC RESPIRATORY FAILURE WITH HYPOXIA (H): Chronic | ICD-10-CM

## 2025-08-18 PROCEDURE — 1126F AMNT PAIN NOTED NONE PRSNT: CPT | Performed by: INTERNAL MEDICINE

## 2025-08-18 PROCEDURE — 98007 SYNCH AUDIO-VIDEO EST HI 40: CPT | Performed by: INTERNAL MEDICINE

## 2025-08-18 ASSESSMENT — PAIN SCALES - GENERAL: PAINLEVEL_OUTOF10: NO PAIN (0)

## 2025-08-20 ENCOUNTER — ALLIED HEALTH/NURSE VISIT (OUTPATIENT)
Dept: PULMONOLOGY | Facility: CLINIC | Age: 60
End: 2025-08-20
Attending: GENETIC COUNSELOR, MS
Payer: COMMERCIAL

## 2025-08-20 ENCOUNTER — LAB (OUTPATIENT)
Dept: LAB | Facility: CLINIC | Age: 60
End: 2025-08-20
Payer: COMMERCIAL

## 2025-08-20 DIAGNOSIS — Z71.83 ENCOUNTER FOR NONPROCREATIVE GENETIC COUNSELING: ICD-10-CM

## 2025-08-20 DIAGNOSIS — Z14.1 CYSTIC FIBROSIS CARRIER: ICD-10-CM

## 2025-08-20 DIAGNOSIS — Z83.49 FAMILY HISTORY OF CYSTIC FIBROSIS: ICD-10-CM

## 2025-08-20 DIAGNOSIS — J44.9 CHRONIC OBSTRUCTIVE PULMONARY DISEASE, UNSPECIFIED COPD TYPE (H): Chronic | ICD-10-CM

## 2025-08-20 DIAGNOSIS — E87.8 HIGH SWEAT CHLORIDE CONCENTRATION WITHOUT CYSTIC FIBROSIS: ICD-10-CM

## 2025-08-20 DIAGNOSIS — J43.9 PULMONARY EMPHYSEMA, UNSPECIFIED EMPHYSEMA TYPE (H): ICD-10-CM

## 2025-08-20 DIAGNOSIS — J96.11 CHRONIC RESPIRATORY FAILURE WITH HYPOXIA (H): Primary | Chronic | ICD-10-CM

## 2025-08-20 LAB — SWEAT CHLORIDE: NORMAL

## 2025-08-20 PROCEDURE — 89230 COLLECT SWEAT FOR TEST: CPT

## 2025-08-20 PROCEDURE — 96041 GENETIC COUNSELING SVC EA 30: CPT | Performed by: GENETIC COUNSELOR, MS

## 2025-08-21 ENCOUNTER — TELEPHONE (OUTPATIENT)
Dept: PULMONOLOGY | Facility: CLINIC | Age: 60
End: 2025-08-21
Payer: COMMERCIAL

## 2025-08-21 ENCOUNTER — DOCUMENTATION ONLY (OUTPATIENT)
Dept: LAB | Facility: CLINIC | Age: 60
End: 2025-08-21
Payer: COMMERCIAL

## 2025-08-21 LAB — INTERPRETATION SERPL IEP-IMP: NORMAL
